# Patient Record
Sex: MALE | Race: WHITE | NOT HISPANIC OR LATINO | Employment: FULL TIME | URBAN - METROPOLITAN AREA
[De-identification: names, ages, dates, MRNs, and addresses within clinical notes are randomized per-mention and may not be internally consistent; named-entity substitution may affect disease eponyms.]

---

## 2018-10-08 ENCOUNTER — OFFICE VISIT (OUTPATIENT)
Dept: URGENT CARE | Facility: CLINIC | Age: 48
End: 2018-10-08

## 2018-10-08 VITALS
OXYGEN SATURATION: 96 % | SYSTOLIC BLOOD PRESSURE: 140 MMHG | HEIGHT: 69 IN | RESPIRATION RATE: 16 BRPM | DIASTOLIC BLOOD PRESSURE: 96 MMHG | BODY MASS INDEX: 41.47 KG/M2 | HEART RATE: 100 BPM | WEIGHT: 280 LBS | TEMPERATURE: 97.7 F

## 2018-10-08 DIAGNOSIS — J01.10 ACUTE NON-RECURRENT FRONTAL SINUSITIS: Primary | ICD-10-CM

## 2018-10-08 RX ORDER — AMOXICILLIN AND CLAVULANATE POTASSIUM 875; 125 MG/1; MG/1
1 TABLET, FILM COATED ORAL EVERY 12 HOURS SCHEDULED
Qty: 14 TABLET | Refills: 0 | Status: SHIPPED | OUTPATIENT
Start: 2018-10-08 | End: 2018-10-15

## 2018-10-08 RX ORDER — AMOXICILLIN AND CLAVULANATE POTASSIUM 875; 125 MG/1; MG/1
1 TABLET, FILM COATED ORAL EVERY 12 HOURS SCHEDULED
Qty: 14 TABLET | Refills: 0 | Status: SHIPPED | OUTPATIENT
Start: 2018-10-08 | End: 2018-10-08 | Stop reason: SDUPTHER

## 2018-10-08 NOTE — PATIENT INSTRUCTIONS
Sinusitis  augmentin as directed  Continue using flonase  Follow up with PCP in 3-5 days  Proceed to  ER if symptoms worsen  Rhinosinusitis   WHAT YOU NEED TO KNOW:   Rhinosinusitis (RS) is inflammation of your nose and sinuses  It commonly begins as a virus, often as a common cold  Viruses usually last 7 to 10 days and do not need treatment  When the virus does not get better on its own, you may have bacterial RS  This means that bacteria have begun to grow inside your sinuses  Acute RS lasts less than 4 weeks  Chronic RS lasts 12 weeks or more  Recurrent RS is when you have 4 or more episodes of RS in one year  DISCHARGE INSTRUCTIONS:   Return to the emergency department if:   · Your eye and eyelid are red, swollen, and painful  · You cannot open your eye  · You have double vision or you cannot see  · Your eyeball bulges out or you cannot move your eye  · You are more sleepy than normal or you notice changes in your ability to think, move, or talk  · You have a stiff neck, a fever, or a bad headache  · You have swelling of your forehead or scalp  Contact your healthcare provider if:   · Your symptoms are worse or do not improve after 3 to 5 days of treatment  · You have questions or concerns about your condition or care  Medicines: You may need any of the following:  · Acetaminophen  decreases pain and fever  It is available without a doctor's order  Ask how much to take and how often to take it  Follow directions  Acetaminophen can cause liver damage if not taken correctly  · NSAIDs , such as ibuprofen, help decrease swelling, pain, and fever  This medicine is available with or without a doctor's order  NSAIDs can cause stomach bleeding or kidney problems in certain people  If you take blood thinner medicine, always ask your healthcare provider if NSAIDs are safe for you  Always read the medicine label and follow directions      · Nasal steroid sprays  decrease inflammation in your nose and sinuses  · Decongestants  reduce swelling and drain mucus in the nose and sinuses  They may help you breathe easier  · Antihistamines  dry mucus in the nose and relieve sneezing  · Antibiotics  treat a bacterial infection and may be needed if your symptoms do not improve or they get worse  · Take your medicine as directed  Contact your healthcare provider if you think your medicine is not helping or if you have side effects  Tell him or her if you are allergic to any medicine  Keep a list of the medicines, vitamins, and herbs you take  Include the amounts, and when and why you take them  Bring the list or the pill bottles to follow-up visits  Carry your medicine list with you in case of an emergency  Self-care:   · Rinse your sinuses  Use a sinus rinse device to rinse your nasal passages with a saline (salt water) solution  This will help thin the mucus in your nose and rinse away pollen and dirt  It will also help reduce swelling so you can breathe normally  Ask your healthcare provider how often to do this  · Breathe in steam   Heat a bowl of water until you see steam  Lean over the bowl and make a tent over your head with a large towel  Breathe deeply for about 20 minutes  Be careful not to get too close to the steam or burn yourself  Do this 3 times a day  You can also breathe deeply when you take a hot shower  · Sleep with your head elevated  Place an extra pillow under your head before you go to sleep to help your sinuses drain  · Drink liquids as directed  Ask your healthcare provider how much liquid to drink each day and which liquids are best for you  Liquids will thin the mucus in your nose and help it drain  Avoid drinks that contain alcohol or caffeine  · Do not smoke, and avoid secondhand smoke  Nicotine and other chemicals in cigarettes and cigars can make your symptoms worse   Ask your healthcare provider for information if you currently smoke and need help to quit  E-cigarettes or smokeless tobacco still contain nicotine  Talk to your healthcare provider before you use these products  Follow up with your healthcare provider as directed: Follow up if your symptoms are worse or not better after 3 to 5 days of treatment  Write down your questions so you remember to ask them during your visits  © 2017 [employer] Information is for End User's use only and may not be sold, redistributed or otherwise used for commercial purposes  All illustrations and images included in CareNotes® are the copyrighted property of [employer]  or Andres Freeman  The above information is an  only  It is not intended as medical advice for individual conditions or treatments  Talk to your doctor, nurse or pharmacist before following any medical regimen to see if it is safe and effective for you

## 2018-10-08 NOTE — PROGRESS NOTES
[facility]        NAME: Dean Merchant is a 56 y o  male  : 1970    MRN: [id number]  DATE: 2018  TIME: 2:00 PM    Assessment and Plan   Acute non-recurrent frontal sinusitis [J01 10]  1  Acute non-recurrent frontal sinusitis  amoxicillin-clavulanate (AUGMENTIN) 875-125 mg per tablet    DISCONTINUED: amoxicillin-clavulanate (AUGMENTIN) 875-125 mg per tablet         Patient Instructions     Sinusitis  augmentin as directed  Continue using flonase  Follow up with PCP in 3-5 days  Proceed to  ER if symptoms worsen  Chief Complaint     Chief Complaint   Patient presents with    Sinusitis     started on saturday with nasal congestion, sore throat, left ear fullness, green nasal drainage          History of Present Illness       Patient c/o sinus pressure and pain, states he has green mucus coming from his nose and now his ears are starting to hurt  Denies fever, chills, SOB, n/v        Review of Systems   Review of Systems   Constitutional: Negative  HENT: Positive for congestion, ear pain, rhinorrhea, sinus pain and sinus pressure  Negative for sore throat  Eyes: Negative  Respiratory: Negative  Negative for apnea, cough, choking, chest tightness, shortness of breath, wheezing and stridor  Cardiovascular: Negative  Negative for chest pain  Current Medications       Current Outpatient Prescriptions:     amoxicillin-clavulanate (AUGMENTIN) 875-125 mg per tablet, Take 1 tablet by mouth every 12 (twelve) hours for 7 days, Disp: 14 tablet, Rfl: 0    Current Allergies     Allergies as of 10/08/2018    (No Known Allergies)            The following portions of the patient's history were reviewed and updated as appropriate: allergies, current medications, past family history, past medical history, past social history, past surgical history and problem list      History reviewed  No pertinent past medical history      Past Surgical History:   Procedure Laterality Date  FRACTURE SURGERY      left ankle, plates and screws        History reviewed  No pertinent family history  Medications have been verified  Objective   /96   Pulse 100   Temp 97 7 °F (36 5 °C)   Resp 16   Ht 5' 9" (1 753 m)   Wt 127 kg (280 lb)   SpO2 96%   BMI 41 35 kg/m²        Physical Exam     Physical Exam   Constitutional: He appears well-developed and well-nourished  HENT:   Head: Normocephalic and atraumatic  Right Ear: Hearing, tympanic membrane, external ear and ear canal normal    Left Ear: Hearing, tympanic membrane, external ear and ear canal normal    Nose: Nose normal    Mouth/Throat: Uvula is midline, oropharynx is clear and moist and mucous membranes are normal    Eyes: Pupils are equal, round, and reactive to light  Conjunctivae and EOM are normal    Neck: Normal range of motion  Neck supple  Cardiovascular: Normal rate, regular rhythm, normal heart sounds and intact distal pulses  Pulmonary/Chest: Effort normal and breath sounds normal    Lymphadenopathy:     He has no cervical adenopathy

## 2018-10-25 ENCOUNTER — OFFICE VISIT (OUTPATIENT)
Dept: URGENT CARE | Facility: CLINIC | Age: 48
End: 2018-10-25
Payer: COMMERCIAL

## 2018-10-25 VITALS
RESPIRATION RATE: 16 BRPM | OXYGEN SATURATION: 96 % | HEART RATE: 111 BPM | HEIGHT: 71 IN | BODY MASS INDEX: 38.5 KG/M2 | SYSTOLIC BLOOD PRESSURE: 158 MMHG | TEMPERATURE: 97.6 F | DIASTOLIC BLOOD PRESSURE: 101 MMHG | WEIGHT: 275 LBS

## 2018-10-25 DIAGNOSIS — J30.2 SEASONAL ALLERGIC RHINITIS, UNSPECIFIED TRIGGER: ICD-10-CM

## 2018-10-25 DIAGNOSIS — H69.82 DYSFUNCTION OF LEFT EUSTACHIAN TUBE: Primary | ICD-10-CM

## 2018-10-25 PROCEDURE — 99213 OFFICE O/P EST LOW 20 MIN: CPT | Performed by: FAMILY MEDICINE

## 2018-10-25 NOTE — PROGRESS NOTES
[facility]        NAME: Luis Daniel Goss is a 56 y o  male  : 1970    MRN: [id number]  DATE: 2018  TIME: 5:09 PM    Assessment and Plan   Dysfunction of left eustachian tube [H69 82]  1  Dysfunction of left eustachian tube     2  Seasonal allergic rhinitis, unspecified trigger       Left eustachian tube dysfunction and sinus pressure likely secondary to nasal cavity inflammation from allergies  Bacterial infection unlikely as he recently completed course of Augmentin  Advised on nasal rinsing twice daily immediately followed by Flonase twice daily  He will perform this for the next 5 days  If symptoms continue to persist, patient is encouraged to follow up with PCP or his ENT physician  Patient Instructions     Follow up with PCP in 3-5 days  Proceed to  ER if symptoms worsen  Chief Complaint     Chief Complaint   Patient presents with    Facial Pain     still having trouble with sinus infection and now having right ear pain  pt was seen here 2-3 weeks ago  pt finished a course of antibiotics          History of Present Illness     69-year-old healthy male who presents today due to persistent sinus pressure and otalgia  Was seen here on 10/08/2018 for the same reasons and prescribed Augmentin for 7 days for presumed bacterial sinusitis  Reports mild improvement in symptoms however he still has sinus pressure the left frontal sinus  Also has left ear otalgia  Denies any fevers, chills, dizziness, headache, chest pain, shortness of breath or abdominal pain  Review of Systems   Review of Systems   Constitutional: Negative for chills and fever  HENT: Positive for congestion, ear pain, rhinorrhea, sinus pain and sinus pressure  Negative for facial swelling and sore throat  Respiratory: Negative for shortness of breath  Cardiovascular: Negative for chest pain  Gastrointestinal: Negative for abdominal pain     Allergic/Immunologic: Positive for environmental allergies  Neurological: Negative for dizziness and headaches  Current Medications     No current outpatient prescriptions on file  Current Allergies     Allergies as of 10/25/2018    (No Known Allergies)            The following portions of the patient's history were reviewed and updated as appropriate: allergies, current medications, past family history, past medical history, past social history, past surgical history and problem list      History reviewed  No pertinent past medical history  Past Surgical History:   Procedure Laterality Date    FRACTURE SURGERY      left ankle, plates and screws        History reviewed  No pertinent family history  Medications have been verified  Objective   BP (!) 158/101   Pulse (!) 111   Temp 97 6 °F (36 4 °C)   Resp 16   Ht 5' 10 8" (1 798 m)   Wt 125 kg (275 lb)   SpO2 96%   BMI 38 57 kg/m²        Physical Exam     Physical Exam   Constitutional: He is oriented to person, place, and time  He appears well-developed and well-nourished  No distress  HENT:   Head: Normocephalic and atraumatic  Right Ear: External ear normal    Left Ear: External ear normal    Nose: Nose normal    Mouth/Throat: Oropharynx is clear and moist  No oropharyngeal exudate  Frontal sinuses nontender to palpation  Left nasal lacrimal region mildly tender to palpation  Face symmetric  Eyes: Pupils are equal, round, and reactive to light  Conjunctivae and EOM are normal  Right eye exhibits no discharge  Left eye exhibits no discharge  No scleral icterus  Pulmonary/Chest: Effort normal    Neurological: He is alert and oriented to person, place, and time  Skin: Skin is warm  No rash noted  He is not diaphoretic  No erythema  Psychiatric: He has a normal mood and affect  His behavior is normal  Judgment and thought content normal    Vitals reviewed

## 2018-10-29 ENCOUNTER — OFFICE VISIT (OUTPATIENT)
Dept: URGENT CARE | Facility: CLINIC | Age: 48
End: 2018-10-29
Payer: COMMERCIAL

## 2018-10-29 VITALS
RESPIRATION RATE: 16 BRPM | HEART RATE: 87 BPM | WEIGHT: 280.2 LBS | SYSTOLIC BLOOD PRESSURE: 188 MMHG | OXYGEN SATURATION: 98 % | BODY MASS INDEX: 39.3 KG/M2 | DIASTOLIC BLOOD PRESSURE: 98 MMHG | TEMPERATURE: 97 F

## 2018-10-29 DIAGNOSIS — I10 ESSENTIAL HYPERTENSION: ICD-10-CM

## 2018-10-29 DIAGNOSIS — K04.7 PERIAPICAL ABSCESS WITHOUT SINUS: Primary | ICD-10-CM

## 2018-10-29 PROCEDURE — 99213 OFFICE O/P EST LOW 20 MIN: CPT | Performed by: FAMILY MEDICINE

## 2018-10-29 RX ORDER — CIPROFLOXACIN 500 MG/1
500 TABLET, FILM COATED ORAL EVERY 12 HOURS SCHEDULED
Qty: 10 TABLET | Refills: 0 | Status: SHIPPED | OUTPATIENT
Start: 2018-10-29 | End: 2018-10-30 | Stop reason: ALTCHOICE

## 2018-10-29 NOTE — LETTER
October 29, 2018     Patient: Marisa Toney   YOB: 1970   Date of Visit: 10/29/2018       To Whom It May Concern:    Dionisiolavon Rayray was seen in my office on 10/29/2018  Plans to return to work on 10/30/2018  If you have any questions or concerns, please don't hesitate to call           Sincerely,        Marco Antonio Milan MD    CC: No Recipients

## 2018-10-29 NOTE — PROGRESS NOTES
[facility]        NAME: Aleena Virgen is a 56 y o  male  : 1970    MRN: [id number]  DATE: 2018  TIME: 12:36 PM    Assessment and Plan   Periapical abscess without sinus [K04 7]  1  Periapical abscess without sinus  clindamycin (CLEOCIN) 300 MG capsule    DISCONTINUED: ciprofloxacin (CIPRO) 500 mg tablet   2  Essential hypertension       1  Gum abscess - Although the lip gum border appears unremarkable, a tender mass can be palpated just below the left nostril concerning for abscess  Has already completed a course of Augmentin earlier this month  Will prescribe clindamycin as this has good anaerobic coverage  Encouraged to follow up PCP to ensure resolution of abscess  2  Hypertension - Diagnosis based on two elevated blood pressure readings over the past 4 days with today's reading initially being 171/122  Patient strongly encouraged to establish care with a primary care physician for further management  Patient Instructions     Follow up with PCP in 3-5 days  Proceed to  ER if symptoms worsen  Chief Complaint     Chief Complaint   Patient presents with    gum infection     PT has oozing from gum  History of Present Illness     61-year-old male who I recently saw due to rhinosinusitis presents today with complaint of a spontaneously draining small gum abscess  Reports that he noticed it this morning and it appeared like a red streak over the left upper lateral incisor  He pressed the gone with his finger and purulent fluid drained out  Since his prior visit, he has been using Neti pot nasal rinse and using Flonase for his initial symptoms  Today complains of a pressure-like sensation near the left maxillary sinus  Denies any dizziness, fevers, chills, chest pain, shortness of breath or abdominal pain  Review of Systems   Review of Systems   Constitutional: Negative for chills and fever  HENT: Positive for congestion and sinus pressure   Negative for nosebleeds  Respiratory: Negative for cough and shortness of breath  Cardiovascular: Negative for chest pain  Gastrointestinal: Negative for abdominal pain, nausea and vomiting  Allergic/Immunologic: Positive for environmental allergies  Neurological: Negative for dizziness and headaches  Current Medications       Current Outpatient Prescriptions:     clindamycin (CLEOCIN) 300 MG capsule, Take 1 capsule (300 mg total) by mouth 2 (two) times a day for 5 days, Disp: 10 capsule, Rfl: 0    Current Allergies     Allergies as of 10/29/2018    (No Known Allergies)            The following portions of the patient's history were reviewed and updated as appropriate: allergies, current medications, past family history, past medical history, past social history, past surgical history and problem list      No past medical history on file  Past Surgical History:   Procedure Laterality Date    FRACTURE SURGERY      left ankle, plates and screws        Family History   Problem Relation Age of Onset    Heart attack Maternal Grandmother     Diabetes Maternal Grandfather          Medications have been verified  Objective   BP (!) 188/98   Pulse 87   Temp (!) 97 °F (36 1 °C)   Resp 16   Wt 127 kg (280 lb 3 2 oz)   SpO2 98%   BMI 39 30 kg/m²        Physical Exam     Physical Exam   Constitutional: He is oriented to person, place, and time  He appears well-developed and well-nourished  No distress  HENT:   Head: Normocephalic and atraumatic  Nose: Nose normal    Mouth/Throat: Oropharynx is clear and moist  No oropharyngeal exudate  Small tender mass palpated just below the left nostril concerning for a deep abscess  Face appears symmetric  No gum abnormality noted above the left lateral incisor  (Abscess near the tooth likely cleared )   Eyes: Conjunctivae are normal  Right eye exhibits no discharge  Left eye exhibits no discharge  No scleral icterus     Cardiovascular: Normal rate and regular rhythm  Pulmonary/Chest: Effort normal  No respiratory distress  He has no wheezes  He has no rales  Neurological: He is alert and oriented to person, place, and time  Skin: Skin is warm  He is not diaphoretic  No erythema  Psychiatric: He has a normal mood and affect  His behavior is normal  Judgment and thought content normal    Vitals reviewed

## 2018-10-30 RX ORDER — CLINDAMYCIN HYDROCHLORIDE 300 MG/1
300 CAPSULE ORAL 2 TIMES DAILY
Qty: 10 CAPSULE | Refills: 0 | Status: SHIPPED | OUTPATIENT
Start: 2018-10-30 | End: 2018-11-04

## 2019-02-26 ENCOUNTER — OFFICE VISIT (OUTPATIENT)
Dept: FAMILY MEDICINE CLINIC | Facility: CLINIC | Age: 49
End: 2019-02-26
Payer: COMMERCIAL

## 2019-02-26 VITALS
WEIGHT: 287 LBS | BODY MASS INDEX: 40.25 KG/M2 | SYSTOLIC BLOOD PRESSURE: 144 MMHG | RESPIRATION RATE: 16 BRPM | TEMPERATURE: 97.9 F | OXYGEN SATURATION: 97 % | DIASTOLIC BLOOD PRESSURE: 104 MMHG | HEART RATE: 104 BPM

## 2019-02-26 DIAGNOSIS — Z83.3 FAMILY HISTORY OF DIABETES MELLITUS (DM): ICD-10-CM

## 2019-02-26 DIAGNOSIS — E66.01 MORBID OBESITY (HCC): ICD-10-CM

## 2019-02-26 DIAGNOSIS — I10 ESSENTIAL HYPERTENSION: ICD-10-CM

## 2019-02-26 DIAGNOSIS — R03.0 BLOOD PRESSURE ELEVATED WITHOUT HISTORY OF HTN: ICD-10-CM

## 2019-02-26 DIAGNOSIS — Z76.89 ENCOUNTER TO ESTABLISH CARE: Primary | ICD-10-CM

## 2019-02-26 PROCEDURE — 3725F SCREEN DEPRESSION PERFORMED: CPT | Performed by: NURSE PRACTITIONER

## 2019-02-26 PROCEDURE — 4010F ACE/ARB THERAPY RXD/TAKEN: CPT | Performed by: NURSE PRACTITIONER

## 2019-02-26 PROCEDURE — 99203 OFFICE O/P NEW LOW 30 MIN: CPT | Performed by: NURSE PRACTITIONER

## 2019-02-26 RX ORDER — LISINOPRIL 10 MG/1
10 TABLET ORAL DAILY
Qty: 30 TABLET | Refills: 1 | Status: SHIPPED | OUTPATIENT
Start: 2019-02-26 | End: 2019-05-10 | Stop reason: SDUPTHER

## 2019-02-26 NOTE — PROGRESS NOTES
Assessment/Plan:  1  Encounter to establish care  Health maintenance discussed  Diet, exercise, weight management, stress management, etc    All questions addressed, answered, and pt verbalized understanding  Anticipatory guidance  - CBC and differential; Future  - Comprehensive metabolic panel; Future  2  Morbid obesity ([facility] [address] )  BMI Counseling: Body mass index is 40 25 kg/m²  Discussed the patient's BMI with him  The BMI is above average  BMI counseling and education was provided to the patient  Nutrition recommendations include reducing portion sizes, decreasing overall calorie intake, reducing fast food intake, consuming healthier snacks, decreasing soda and/or juice intake and moderation in carbohydrate intake  Exercise recommendations include exercising 3-5 times per week  - CBC and differential; Future  - Comprehensive metabolic panel; Future  - Hemoglobin A1C; Future  - Lipid panel; Future  - TSH, 3rd generation; Future  3  BMI 40 0-44 9, adult ([facility])  BMI Counseling: Body mass index is 40 25 kg/m²  Discussed the patient's BMI with him  The BMI is above average  BMI counseling and education was provided to the patient  Nutrition recommendations include reducing portion sizes, decreasing overall calorie intake, reducing fast food intake, consuming healthier snacks, decreasing soda and/or juice intake and moderation in carbohydrate intake  Exercise recommendations include exercising 3-5 times per week  - CBC and differential; Future  - Comprehensive metabolic panel; Future  - Hemoglobin A1C; Future  - Lipid panel; Future  - TSH, 3rd generation; Future  4  Blood pressure elevated without history of HTN  - CBC and differential; Future  - Comprehensive metabolic panel; Future  5  Essential hypertension  Will start lisinopril  Check labs  Weight loss  Monitor bp  Will recheck bp in office in one week  - lisinopril (ZESTRIL) 10 mg tablet;  Take 1 tablet (10 mg total) by mouth daily  Dispense: 30 tablet; Refill: 1    Patient was counseled regarding instructions for management which included: impression/diagnosis, risk/benefits of treatment options, importance of compliance with treatment, risk factor reduction, and prognosis  I have reviewed the instructions with the patient answering all questions and patient verbalized understanding  Subjective:      Patient ID: Jaswant Leigh is a 53 y o  male who presents to establish care    Here to establish care  Has been told has high blood pressure but never diagnosed  No chest pain  No sob  No headache  No dizziness  No recent labs  The following portions of the patient's history were reviewed and updated as appropriate: allergies, current medications, past family history, past medical history, past social history, past surgical history and problem list     Review of Systems   Constitutional: Negative for activity change, appetite change, fatigue and unexpected weight change  Respiratory: Negative for cough, chest tightness, shortness of breath and wheezing  Cardiovascular: Negative for chest pain, palpitations and leg swelling  Gastrointestinal: Negative for abdominal pain, diarrhea, nausea and vomiting  Skin: Negative for color change  Neurological: Negative for dizziness, speech difficulty and headaches  Objective:      BP (!) 144/104 (BP Location: Right arm, Patient Position: Sitting, Cuff Size: Large)   Pulse 104   Temp 97 9 °F (36 6 °C) (Temporal)   Resp 16   Wt 130 kg (287 lb)   SpO2 97%   BMI 40 25 kg/m²          Physical Exam   Constitutional: He is oriented to person, place, and time  He appears well-developed and well-nourished  No distress  Obese, pleasant, cooperative   Neck: Normal range of motion  Neck supple  Cardiovascular: Normal rate and regular rhythm  No audible carotid bruit  No jvd   Pulmonary/Chest: Effort normal and breath sounds normal  No respiratory distress  He has no wheezes  Musculoskeletal: He exhibits no edema  Neurological: He is alert and oriented to person, place, and time  No cranial nerve deficit  Coordination normal    Skin: Skin is warm and dry  No rash noted  He is not diaphoretic  No erythema  Psychiatric: He has a normal mood and affect   His behavior is normal  Judgment and thought content normal

## 2019-02-28 LAB
ALBUMIN SERPL-MCNC: 4.5 G/DL (ref 3.5–5.5)
ALBUMIN/GLOB SERPL: 1.6 {RATIO} (ref 1.2–2.2)
ALP SERPL-CCNC: 56 IU/L (ref 39–117)
ALT SERPL-CCNC: 54 IU/L (ref 0–44)
AST SERPL-CCNC: 27 IU/L (ref 0–40)
BASOPHILS # BLD AUTO: 0 X10E3/UL (ref 0–0.2)
BASOPHILS NFR BLD AUTO: 0 %
BILIRUB SERPL-MCNC: 0.5 MG/DL (ref 0–1.2)
BUN SERPL-MCNC: 14 MG/DL (ref 6–24)
BUN/CREAT SERPL: 13 (ref 9–20)
CALCIUM SERPL-MCNC: 9.6 MG/DL (ref 8.7–10.2)
CHLORIDE SERPL-SCNC: 101 MMOL/L (ref 96–106)
CHOLEST SERPL-MCNC: 214 MG/DL (ref 100–199)
CHOLEST/HDLC SERPL: 7.6 RATIO (ref 0–5)
CO2 SERPL-SCNC: 23 MMOL/L (ref 20–29)
CREAT SERPL-MCNC: 1.07 MG/DL (ref 0.76–1.27)
EOSINOPHIL # BLD AUTO: 0.3 X10E3/UL (ref 0–0.4)
EOSINOPHIL NFR BLD AUTO: 3 %
ERYTHROCYTE [DISTWIDTH] IN BLOOD BY AUTOMATED COUNT: 13.1 % (ref 12.3–15.4)
EST. AVERAGE GLUCOSE BLD GHB EST-MCNC: 157 MG/DL
GLOBULIN SER-MCNC: 2.8 G/DL (ref 1.5–4.5)
GLUCOSE SERPL-MCNC: 144 MG/DL (ref 65–99)
HBA1C MFR BLD: 7.1 % (ref 4.8–5.6)
HCT VFR BLD AUTO: 45.3 % (ref 37.5–51)
HDLC SERPL-MCNC: 28 MG/DL
HGB BLD-MCNC: 15.8 G/DL (ref 13–17.7)
IMM GRANULOCYTES # BLD: 0 X10E3/UL (ref 0–0.1)
IMM GRANULOCYTES NFR BLD: 0 %
LDLC SERPL CALC-MCNC: 158 MG/DL (ref 0–99)
LYMPHOCYTES # BLD AUTO: 3.3 X10E3/UL (ref 0.7–3.1)
LYMPHOCYTES NFR BLD AUTO: 39 %
MCH RBC QN AUTO: 30 PG (ref 26.6–33)
MCHC RBC AUTO-ENTMCNC: 34.9 G/DL (ref 31.5–35.7)
MCV RBC AUTO: 86 FL (ref 79–97)
MONOCYTES # BLD AUTO: 0.6 X10E3/UL (ref 0.1–0.9)
MONOCYTES NFR BLD AUTO: 8 %
NEUTROPHILS # BLD AUTO: 4.3 X10E3/UL (ref 1.4–7)
NEUTROPHILS NFR BLD AUTO: 50 %
PLATELET # BLD AUTO: 250 X10E3/UL (ref 150–379)
POTASSIUM SERPL-SCNC: 4.5 MMOL/L (ref 3.5–5.2)
PROT SERPL-MCNC: 7.3 G/DL (ref 6–8.5)
RBC # BLD AUTO: 5.27 X10E6/UL (ref 4.14–5.8)
SL AMB EGFR AFRICAN AMERICAN: 95 ML/MIN/1.73
SL AMB EGFR NON AFRICAN AMERICAN: 82 ML/MIN/1.73
SL AMB VLDL CHOLESTEROL CALC: 28 MG/DL (ref 5–40)
SODIUM SERPL-SCNC: 139 MMOL/L (ref 134–144)
TRIGL SERPL-MCNC: 142 MG/DL (ref 0–149)
TSH SERPL DL<=0.005 MIU/L-ACNC: 1.24 UIU/ML (ref 0.45–4.5)
WBC # BLD AUTO: 8.5 X10E3/UL (ref 3.4–10.8)

## 2019-02-28 PROCEDURE — 3045F PR MOST RECENT HEMOGLOBIN A1C LEVEL 7.0-9.0%: CPT | Performed by: NURSE PRACTITIONER

## 2019-03-05 ENCOUNTER — OFFICE VISIT (OUTPATIENT)
Dept: FAMILY MEDICINE CLINIC | Facility: CLINIC | Age: 49
End: 2019-03-05
Payer: COMMERCIAL

## 2019-03-05 VITALS
OXYGEN SATURATION: 95 % | DIASTOLIC BLOOD PRESSURE: 70 MMHG | BODY MASS INDEX: 38.92 KG/M2 | HEIGHT: 71 IN | HEART RATE: 108 BPM | RESPIRATION RATE: 20 BRPM | TEMPERATURE: 97.7 F | WEIGHT: 278 LBS | SYSTOLIC BLOOD PRESSURE: 110 MMHG

## 2019-03-05 DIAGNOSIS — I10 ESSENTIAL HYPERTENSION: Primary | ICD-10-CM

## 2019-03-05 DIAGNOSIS — E66.01 CLASS 2 SEVERE OBESITY DUE TO EXCESS CALORIES WITH SERIOUS COMORBIDITY AND BODY MASS INDEX (BMI) OF 39.0 TO 39.9 IN ADULT (HCC): ICD-10-CM

## 2019-03-05 DIAGNOSIS — E13.9 DIABETES 1.5, MANAGED AS TYPE 2 (HCC): ICD-10-CM

## 2019-03-05 DIAGNOSIS — E78.2 MIXED HYPERLIPIDEMIA: ICD-10-CM

## 2019-03-05 PROBLEM — E66.812 CLASS 2 SEVERE OBESITY DUE TO EXCESS CALORIES WITH SERIOUS COMORBIDITY AND BODY MASS INDEX (BMI) OF 39.0 TO 39.9 IN ADULT (HCC): Status: ACTIVE | Noted: 2019-03-05

## 2019-03-05 PROCEDURE — 3078F DIAST BP <80 MM HG: CPT | Performed by: NURSE PRACTITIONER

## 2019-03-05 PROCEDURE — 3008F BODY MASS INDEX DOCD: CPT | Performed by: NURSE PRACTITIONER

## 2019-03-05 PROCEDURE — 99214 OFFICE O/P EST MOD 30 MIN: CPT | Performed by: NURSE PRACTITIONER

## 2019-03-05 PROCEDURE — 1036F TOBACCO NON-USER: CPT | Performed by: NURSE PRACTITIONER

## 2019-03-05 PROCEDURE — 3074F SYST BP LT 130 MM HG: CPT | Performed by: NURSE PRACTITIONER

## 2019-03-05 RX ORDER — ATORVASTATIN CALCIUM 20 MG/1
20 TABLET, FILM COATED ORAL DAILY
Qty: 30 TABLET | Refills: 3 | Status: SHIPPED | OUTPATIENT
Start: 2019-03-05 | End: 2019-07-27 | Stop reason: SDUPTHER

## 2019-03-05 NOTE — PROGRESS NOTES
Assessment/Plan:  1  Essential hypertension  bp stable  Continue lisinopril  Monitor bp  - CBC and differential; Future  - Comprehensive metabolic panel; Future  - Microalbumin / creatinine urine ratio; Future  - 2  Diabetes 1 5, managed as type 2 ([address] )  Carb controlled diet  Will start metformin  Weight loss  Exercise  - metFORMIN (GLUCOPHAGE) 500 mg tablet; Take 1 tablet (500 mg total) by mouth 2 (two) times a day with meals  Dispense: 60 tablet; Refill: 3  - CBC and differential; Future  - Comprehensive metabolic panel; Future  - Hemoglobin A1C; Future  - 3  Mixed hyperlipidemia  Heart healthy diet  Will start lipitor    - atorvastatin (LIPITOR) 20 mg tablet; Take 1 tablet (20 mg total) by mouth daily  Dispense: 30 tablet; Refill: 3  - CBC and differential; Future  - Comprehensive metabolic panel; Future  - Lipid panel; Future  4  Class 2 severe obesity due to excess calories with serious comorbidity and body mass index (BMI) of 39 0 to 39 9 in adult (HCC)  BMI Counseling: Body mass index is 39 33 kg/m²  Discussed the patient's BMI with him  The BMI is above average  BMI counseling and education was provided to the patient  Nutrition recommendations include reducing portion sizes, decreasing overall calorie intake, reducing fast food intake, consuming healthier snacks, decreasing soda and/or juice intake, moderation in carbohydrate intake and reducing intake of saturated fat and trans fat  Exercise recommendations include exercising 3-5 times per week  - CBC and differential; Future  - Comprehensive metabolic panel; Future  - CBC and differential  - Comprehensive metabolic panel    Patient was counseled regarding instructions for management which included: impression/diagnosis, risk/benefits of treatment options, importance of compliance with treatment, risk factor reduction, and prognosis     I have reviewed the instructions with the patient answering all questions and patient verbalized understanding  Subjective:      Patient ID: Sabrina Miller is a 53 y o  male who presents to follow up on labs and bp    Here to review labs  Was started on bp meds on 2/26/19  Tolerating with no adverse effects  Denies any physical c/o  Has started making changes to diet  Lost 9 lbs in past week  The following portions of the patient's history were reviewed and updated as appropriate: allergies, current medications, past family history, past medical history, past social history, past surgical history and problem list     Review of Systems   Constitutional: Negative for activity change, appetite change, fatigue and unexpected weight change  Respiratory: Negative for cough and shortness of breath  Cardiovascular: Negative for chest pain, palpitations and leg swelling  Gastrointestinal: Negative for diarrhea, nausea and vomiting  Neurological: Negative for dizziness and headaches  Objective:  Recent Results (from the past 672 hour(s))   CBC and differential    Collection Time: 02/27/19  7:48 AM   Result Value Ref Range    White Blood Cell Count 8 5 3 4 - 10 8 x10E3/uL    Red Blood Cell Count 5 27 4  14 - 5 80 x10E6/uL    Hemoglobin 15 8 13 0 - 17 7 g/dL    HCT 45 3 37 5 - 51 0 %    MCV 86 79 - 97 fL    MCH 30 0 26 6 - 33 0 pg    MCHC 34 9 31 5 - 35 7 g/dL    RDW 13 1 12 3 - 15 4 %    Platelet Count 132 296 - 379 x10E3/uL    Neutrophils 50 Not Estab  %    Lymphocytes 39 Not Estab  %    Monocytes 8 Not Estab  %    Eosinophils 3 Not Estab  %    Basophils PCT 0 Not Estab  %    Neutrophils (Absolute) 4 3 1 4 - 7 0 x10E3/uL    Lymphocytes (Absolute) 3 3 (H) 0 7 - 3 1 x10E3/uL    Monocytes (Absolute) 0 6 0 1 - 0 9 x10E3/uL    Eosinophils (Absolute) 0 3 0 0 - 0 4 x10E3/uL    Basophils ABS 0 0 0 0 - 0 2 x10E3/uL    Immature Granulocytes 0 Not Estab  %    Immature Granulocytes (Absolute) 0 0 0 0 - 0 1 x10E3/uL   Comprehensive metabolic panel    Collection Time: 02/27/19  7:48 AM   Result Value Ref Range    Glucose, Random 144 (H) 65 - 99 mg/dL    BUN 14 6 - 24 mg/dL    Creatinine 1 07 0 76 - 1 27 mg/dL    eGFR Non  82 >59 mL/min/1 73    eGFR  95 >59 mL/min/1 73    SL AMB BUN/CREATININE RATIO 13 9 - 20    Sodium 139 134 - 144 mmol/L    Potassium 4 5 3 5 - 5 2 mmol/L    Chloride 101 96 - 106 mmol/L    CO2 23 20 - 29 mmol/L    CALCIUM 9 6 8 7 - 10 2 mg/dL    Protein, Total 7 3 6 0 - 8 5 g/dL    Albumin 4 5 3 5 - 5 5 g/dL    Globulin, Total 2 8 1 5 - 4 5 g/dL    Albumin/Globulin Ratio 1 6 1 2 - 2 2    TOTAL BILIRUBIN 0 5 0 0 - 1 2 mg/dL    Alk Phos Isoenzymes 56 39 - 117 IU/L    AST 27 0 - 40 IU/L    ALT 54 (H) 0 - 44 IU/L   Hemoglobin A1C    Collection Time: 02/27/19  7:48 AM   Result Value Ref Range    Hemoglobin A1C 7 1 (H) 4 8 - 5 6 %    Estimated Average Glucose 157 mg/dL   Lipid panel    Collection Time: 02/27/19  7:48 AM   Result Value Ref Range    Cholesterol, Total 214 (H) 100 - 199 mg/dL    Triglycerides 142 0 - 149 mg/dL    HDL 28 (L) >39 mg/dL    VLDL Cholesterol Calculated 28 5 - 40 mg/dL    LDL Direct 158 (H) 0 - 99 mg/dL    T  Chol/HDL Ratio 7 6 (H) 0 0 - 5 0 ratio   TSH, 3rd generation    Collection Time: 02/27/19  7:48 AM   Result Value Ref Range    TSH 1 240 0 450 - 4 500 uIU/mL         /70   Pulse (!) 108   Temp 97 7 °F (36 5 °C) (Temporal)   Resp 20   Ht 5' 10 5" (1 791 m)   Wt 126 kg (278 lb)   SpO2 95%   BMI 39 33 kg/m²          Physical Exam   Constitutional: He is oriented to person, place, and time  He appears well-developed and well-nourished  No distress  Neck: Normal range of motion  Neck supple  Cardiovascular: Normal rate and regular rhythm  No JVD  No audible carotid bruit   Pulmonary/Chest: Effort normal and breath sounds normal  No respiratory distress  He has no wheezes  He has no rales  Musculoskeletal: He exhibits no edema  Neurological: He is alert and oriented to person, place, and time     Skin: Skin is warm and dry  He is not diaphoretic  No erythema  No pallor  Psychiatric: He has a normal mood and affect  His behavior is normal  Judgment normal    Vitals reviewed

## 2019-03-05 NOTE — PATIENT INSTRUCTIONS
Continue Lisinopril  (blood pressure)  Start Lipitor 20mg daily (cholesterol)  Metformin 500mg twice daily with meals  (diabetes)  Heart healthy diet  Carbohydrate controlled diet  Exercise  Weight loss

## 2019-03-26 ENCOUNTER — OFFICE VISIT (OUTPATIENT)
Dept: FAMILY MEDICINE CLINIC | Facility: CLINIC | Age: 49
End: 2019-03-26
Payer: COMMERCIAL

## 2019-03-26 VITALS
SYSTOLIC BLOOD PRESSURE: 136 MMHG | HEART RATE: 108 BPM | TEMPERATURE: 99.1 F | OXYGEN SATURATION: 96 % | DIASTOLIC BLOOD PRESSURE: 80 MMHG | WEIGHT: 280 LBS | HEIGHT: 71 IN | BODY MASS INDEX: 39.2 KG/M2 | RESPIRATION RATE: 14 BRPM

## 2019-03-26 DIAGNOSIS — R53.83 FATIGUE, UNSPECIFIED TYPE: ICD-10-CM

## 2019-03-26 DIAGNOSIS — G47.19 EXCESSIVE DAYTIME SLEEPINESS: Primary | ICD-10-CM

## 2019-03-26 DIAGNOSIS — R06.83 SNORING: ICD-10-CM

## 2019-03-26 PROCEDURE — 1036F TOBACCO NON-USER: CPT | Performed by: NURSE PRACTITIONER

## 2019-03-26 PROCEDURE — 99213 OFFICE O/P EST LOW 20 MIN: CPT | Performed by: NURSE PRACTITIONER

## 2019-03-26 PROCEDURE — 3008F BODY MASS INDEX DOCD: CPT | Performed by: NURSE PRACTITIONER

## 2019-03-26 NOTE — PROGRESS NOTES
Assessment/Plan:    1  Excessive daytime sleepiness  - Diagnostic Sleep Study; Future  2  Snoring  - Diagnostic Sleep Study; Future  3  Fatigue, unspecified type  - Diagnostic Sleep Study; Future  4  BMI 39 0-39 9,adult  - Diagnostic Sleep Study; Future          Subjective:      Patient ID: Amie Zurita is a 53 y o  male who presents to discuss possible sleep apnea    Here to discuss possible sleep apnea  Always tired  Even feels like could fall asleep in stopped in car  Snores  Has been told stops breathing while sleeping  Has had symptoms for years  The following portions of the patient's history were reviewed and updated as appropriate: allergies, current medications, past family history, past medical history, past social history, past surgical history and problem list     Review of Systems   Constitutional: Positive for fatigue  Respiratory: Positive for apnea (has been told stops breathing while sleeping)  Negative for shortness of breath  Cardiovascular: Negative for chest pain, palpitations and leg swelling  Neurological: Negative for dizziness and headaches  Objective:  [place] sleep scale: score 18  Sleep bang sleep scale: score 7    /80 (BP Location: Left arm, Patient Position: Sitting, Cuff Size: Large)   Pulse (!) 108   Temp 99 1 °F (37 3 °C) (Temporal)   Resp 14   Ht 5' 11" (1 803 m)   Wt 127 kg (280 lb)   SpO2 96%   BMI 39 05 kg/m²          Physical Exam   Constitutional: He is oriented to person, place, and time  He appears well-developed and well-nourished  Pulmonary/Chest: Effort normal and breath sounds normal    Neurological: He is alert and oriented to person, place, and time  Skin: Skin is warm and dry  Psychiatric: He has a normal mood and affect  His behavior is normal  Judgment and thought content normal    Vitals reviewed

## 2019-04-30 ENCOUNTER — HOSPITAL ENCOUNTER (OUTPATIENT)
Dept: SLEEP CENTER | Facility: CLINIC | Age: 49
Discharge: HOME | End: 2019-04-30
Payer: COMMERCIAL

## 2019-04-30 DIAGNOSIS — R06.83 SNORING: ICD-10-CM

## 2019-04-30 DIAGNOSIS — G47.19 EXCESSIVE DAYTIME SLEEPINESS: ICD-10-CM

## 2019-04-30 DIAGNOSIS — R53.83 FATIGUE, UNSPECIFIED TYPE: ICD-10-CM

## 2019-04-30 PROCEDURE — 95810 POLYSOM 6/> YRS 4/> PARAM: CPT

## 2019-05-02 ENCOUNTER — TELEPHONE (OUTPATIENT)
Dept: SLEEP CENTER | Facility: CLINIC | Age: 49
End: 2019-05-02

## 2019-05-07 ENCOUNTER — TELEPHONE (OUTPATIENT)
Dept: FAMILY MEDICINE CLINIC | Facility: CLINIC | Age: 49
End: 2019-05-07

## 2019-05-10 DIAGNOSIS — I10 ESSENTIAL HYPERTENSION: ICD-10-CM

## 2019-05-11 PROCEDURE — 4010F ACE/ARB THERAPY RXD/TAKEN: CPT | Performed by: NURSE PRACTITIONER

## 2019-05-11 RX ORDER — LISINOPRIL 10 MG/1
TABLET ORAL
Qty: 30 TABLET | Refills: 1 | Status: SHIPPED | OUTPATIENT
Start: 2019-05-11 | End: 2019-07-27 | Stop reason: SDUPTHER

## 2019-05-30 ENCOUNTER — OFFICE VISIT (OUTPATIENT)
Dept: FAMILY MEDICINE CLINIC | Facility: CLINIC | Age: 49
End: 2019-05-30
Payer: COMMERCIAL

## 2019-05-30 VITALS
WEIGHT: 282 LBS | OXYGEN SATURATION: 96 % | RESPIRATION RATE: 12 BRPM | HEIGHT: 70 IN | TEMPERATURE: 98.4 F | DIASTOLIC BLOOD PRESSURE: 90 MMHG | SYSTOLIC BLOOD PRESSURE: 130 MMHG | HEART RATE: 75 BPM | BODY MASS INDEX: 40.37 KG/M2

## 2019-05-30 DIAGNOSIS — H66.92 ACUTE LEFT OTITIS MEDIA: Primary | ICD-10-CM

## 2019-05-30 PROCEDURE — 3008F BODY MASS INDEX DOCD: CPT | Performed by: NURSE PRACTITIONER

## 2019-05-30 PROCEDURE — 99213 OFFICE O/P EST LOW 20 MIN: CPT | Performed by: NURSE PRACTITIONER

## 2019-05-30 RX ORDER — AMOXICILLIN 875 MG/1
875 TABLET, COATED ORAL 2 TIMES DAILY
Qty: 14 TABLET | Refills: 0 | Status: SHIPPED | OUTPATIENT
Start: 2019-05-30 | End: 2019-06-06

## 2019-06-10 ENCOUNTER — TELEPHONE (OUTPATIENT)
Dept: FAMILY MEDICINE CLINIC | Facility: CLINIC | Age: 49
End: 2019-06-10

## 2019-06-27 ENCOUNTER — OFFICE VISIT (OUTPATIENT)
Dept: SLEEP CENTER | Facility: CLINIC | Age: 49
End: 2019-06-27
Payer: COMMERCIAL

## 2019-06-27 VITALS
BODY MASS INDEX: 41.62 KG/M2 | HEART RATE: 98 BPM | WEIGHT: 281 LBS | HEIGHT: 69 IN | SYSTOLIC BLOOD PRESSURE: 143 MMHG | DIASTOLIC BLOOD PRESSURE: 105 MMHG

## 2019-06-27 DIAGNOSIS — E66.01 MORBID OBESITY WITH BMI OF 40.0-44.9, ADULT (HCC): ICD-10-CM

## 2019-06-27 DIAGNOSIS — J30.2 SEASONAL ALLERGIC RHINITIS, UNSPECIFIED TRIGGER: ICD-10-CM

## 2019-06-27 DIAGNOSIS — G47.9 SLEEP DISTURBANCE: ICD-10-CM

## 2019-06-27 DIAGNOSIS — G47.33 OSA (OBSTRUCTIVE SLEEP APNEA): Primary | ICD-10-CM

## 2019-06-27 DIAGNOSIS — I10 ESSENTIAL HYPERTENSION: ICD-10-CM

## 2019-06-27 DIAGNOSIS — G47.19 EXCESSIVE DAYTIME SLEEPINESS: ICD-10-CM

## 2019-06-27 DIAGNOSIS — R51.9 HEADACHE UPON AWAKENING: ICD-10-CM

## 2019-06-27 PROCEDURE — 99204 OFFICE O/P NEW MOD 45 MIN: CPT | Performed by: INTERNAL MEDICINE

## 2019-07-02 ENCOUNTER — TELEPHONE (OUTPATIENT)
Dept: FAMILY MEDICINE CLINIC | Facility: CLINIC | Age: 49
End: 2019-07-02

## 2019-07-02 NOTE — TELEPHONE ENCOUNTER
Called and left message, pt has outstanding labs that he needs to have done and an appt to go over them

## 2019-07-03 LAB
LEFT EYE DIABETIC RETINOPATHY: NORMAL
RIGHT EYE DIABETIC RETINOPATHY: NORMAL

## 2019-07-10 ENCOUNTER — TELEPHONE (OUTPATIENT)
Dept: SLEEP CENTER | Facility: CLINIC | Age: 49
End: 2019-07-10

## 2019-07-10 ENCOUNTER — TRANSCRIBE ORDERS (OUTPATIENT)
Dept: SLEEP CENTER | Facility: CLINIC | Age: 49
End: 2019-07-10

## 2019-07-10 DIAGNOSIS — G47.33 OSA (OBSTRUCTIVE SLEEP APNEA): Primary | ICD-10-CM

## 2019-07-10 NOTE — TELEPHONE ENCOUNTER
----- Message from Maryellen Loera sent at 7/10/2019  9:19 AM EDT -----  Regarding: RE: Your Recent Visit  Contact: [phone]  DR Cony Harris       My insurance denied my second sleep study for not medical necessity  They approved in home study instead of in lab   They said you can appeal it   I would prefer to have this done in the lab instead of at home   The # to appeal is [phone]       ----- Message -----  From: Za Tompkins MD  Sent: 6/27/19 10:52 AM  To: Maryellen Evaristo  Subject: Your Recent Visit    Maryellen Loera, you have a new After Visit Summary in [address]  Please click on visits and then your most recent appointment, to view your After Visit Summary  If you are experiencing any technical issues please call our patient service desk at [phone]) option [phone]

## 2019-07-14 DIAGNOSIS — G47.33 OSA (OBSTRUCTIVE SLEEP APNEA): Primary | ICD-10-CM

## 2019-07-15 NOTE — TELEPHONE ENCOUNTER
Discussed APAP order with patient- he was unclear as to what APAP was but after discussing is ok with proceeding with DME set-up  He is going to call insurance company to find DME provider & call back with that information

## 2019-07-16 NOTE — TELEPHONE ENCOUNTER
Patient left a message on voicemail asking for return call- called patient back and left message for him to return call

## 2019-07-27 DIAGNOSIS — E13.9 DIABETES 1.5, MANAGED AS TYPE 2 (HCC): ICD-10-CM

## 2019-07-27 DIAGNOSIS — I10 ESSENTIAL HYPERTENSION: ICD-10-CM

## 2019-07-27 DIAGNOSIS — E78.2 MIXED HYPERLIPIDEMIA: ICD-10-CM

## 2019-07-27 PROCEDURE — 4010F ACE/ARB THERAPY RXD/TAKEN: CPT | Performed by: NURSE PRACTITIONER

## 2019-07-27 RX ORDER — LISINOPRIL 10 MG/1
TABLET ORAL
Qty: 30 TABLET | Refills: 1 | Status: SHIPPED | OUTPATIENT
Start: 2019-07-27 | End: 2020-02-18 | Stop reason: SDUPTHER

## 2019-07-27 RX ORDER — ATORVASTATIN CALCIUM 20 MG/1
TABLET, FILM COATED ORAL
Qty: 30 TABLET | Refills: 3 | Status: SHIPPED | OUTPATIENT
Start: 2019-07-27 | End: 2020-02-18 | Stop reason: SDUPTHER

## 2019-08-01 ENCOUNTER — TELEPHONE (OUTPATIENT)
Dept: FAMILY MEDICINE CLINIC | Facility: CLINIC | Age: 49
End: 2019-08-01

## 2019-08-01 NOTE — TELEPHONE ENCOUNTER
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

## 2019-09-26 ENCOUNTER — OFFICE VISIT (OUTPATIENT)
Dept: SLEEP CENTER | Facility: CLINIC | Age: 49
End: 2019-09-26
Payer: COMMERCIAL

## 2019-09-26 VITALS
HEART RATE: 76 BPM | SYSTOLIC BLOOD PRESSURE: 147 MMHG | DIASTOLIC BLOOD PRESSURE: 107 MMHG | WEIGHT: 276 LBS | HEIGHT: 69 IN | BODY MASS INDEX: 40.88 KG/M2

## 2019-09-26 DIAGNOSIS — G47.19 EXCESSIVE DAYTIME SLEEPINESS: ICD-10-CM

## 2019-09-26 DIAGNOSIS — G47.33 OSA (OBSTRUCTIVE SLEEP APNEA): Primary | ICD-10-CM

## 2019-09-26 DIAGNOSIS — R51.9 HEADACHE UPON AWAKENING: ICD-10-CM

## 2019-09-26 DIAGNOSIS — G47.9 SLEEP DISTURBANCE: ICD-10-CM

## 2019-09-26 DIAGNOSIS — I10 ESSENTIAL HYPERTENSION: ICD-10-CM

## 2019-09-26 DIAGNOSIS — J30.2 SEASONAL ALLERGIC RHINITIS, UNSPECIFIED TRIGGER: ICD-10-CM

## 2019-09-26 DIAGNOSIS — E66.01 MORBID OBESITY WITH BMI OF 40.0-44.9, ADULT (HCC): ICD-10-CM

## 2019-09-26 PROCEDURE — 99214 OFFICE O/P EST MOD 30 MIN: CPT | Performed by: INTERNAL MEDICINE

## 2019-09-26 NOTE — PROGRESS NOTES
Follow-Up Note - [address]  53 y o  male  :1970  DJN:[id number]    CC: I saw this patient for follow-up in clinic today for his Sleep Disordered Breathing, Coexisting Sleep and Medical Problems  PFSH, Problem List, Medications & Allergies were reviewed in EMR  Interval changes: none reported  He  has a past medical history of Class 2 severe obesity due to excess calories with serious comorbidity and body mass index (BMI) of 39 0 to 39 9 in adult [facility]) (3/5/2019), Diabetes 1 5, managed as type 2 ([facility] [address] ) (3/5/2019), and Essential hypertension (3/5/2019)  He has a current medication list which includes the following prescription(s): atorvastatin, lisinopril, and metformin  ROS: Reviewed (see attached)  Significant for some recent weight loss  He has nasal symptoms due to allergies  DATA REVIEWED:  He states he has met his compliance requirement  Presently, using PAP > 4 hours/night most of the time & is benefitting from use  Recently he missed a few days while traveling for work  Respiratory event index is 1 per hour at 10 cm H2O   SUBJECTIVE: Regarding use of PAP, Aditya Rapp reports:   · He is experiencing no  adverse effects, but at times removes the mask after few hours to allow better sleep :   · He is   benefiting from use: sleeping better and improved headaches    Sleep Routine: He reports getting 7 hrs sleep  ; he has no difficulty initiating or maintaining sleep   He awakens with the aid of an alarm and feels refreshed  He significantly improved  excessive drowsiness   He rated himself at Total score: 11 /24 on the [place] sleepiness scale  Habits: reports that he quit smoking about 15 years ago  His smoking use included cigarettes  He quit smokeless tobacco use about 20 years ago  ,  reports that he does not drink alcohol ,  reports that he does not use drugs  , Caffeine use: limited , Exercise routine: sometimes         OBJECTIVE: BP (!) 147/107   Pulse 76 Ht 5' 9" (1 753 m)   Wt 125 kg (276 lb)   BMI 40 76 kg/m²    Constitutional: Patient is well groomed; well appearing  Skin/Extrem: warm & dry; col & hydration normal; no edema  Psych: cooperativeand in no distress  Mental State appears normal   CNS: Alert, orientated, clear & coherent speech  H&N: EOMI; NC/AT:no facial pressure marks, no rashes  "    ENMT Mucus membranes normal Nasal airway:patent  Oral airway: crowded  Resp:effort is normal CVS: RRR ABD:truncal obesity MSK:Gait normal     ASSESSMENT: Primary Sleep/Secondary(to Medical or Psych conditions) & comorbidities   1  [place] (obstructive sleep apnea)  PAP DME Resupply/Reorder   2  Sleep disturbance      Improved   3  Headache upon awakening      Improved   4  Excessive daytime sleepiness     5  Essential hypertension     6  Morbid obesity with BMI of 40 0-44 9, adult ([facility] [address] )     7  Seasonal allergic rhinitis, unspecified trigger         PLAN:  1  Treatment with  PAP is medically necessary and Alvaton Carbine is agreable to continue use  2  Care of equipment, methods to improve comfort using PAP and importance of compliance with therapy were discussed  3  Pressure setting: continue 10 cmH2O     4  Rx provided to replace supplies and Care coordinated with DME provider  5  Strategies for weight reduction were discussed  6  Nasal symptoms may improve with regular nasal saline rinse followed by topical nasal steroid if necessary  7  I also advised allowing sufficient opportunity for sleep  8  Follow-up is advised in 1 year or sooner if needed to monitor progress, compliance and to adjust therapy  Thank you for allowing me to participate in the care of this patient      Sincerely,    Authenticated electronically by Jie Christine MD on 09/50/53   Board Certified Specialist

## 2019-09-26 NOTE — PROGRESS NOTES
Review of Systems      Genitourinary none   Cardiology none   Gastrointestinal none   Neurology none   Constitutional none   Integumentary none   Psychiatry none   Musculoskeletal joint pain, muscle aches and back pain   Pulmonary none   ENT none   Endocrine none   Hematological none

## 2019-09-26 NOTE — PATIENT INSTRUCTIONS

## 2019-10-02 ENCOUNTER — TELEPHONE (OUTPATIENT)
Dept: SLEEP CENTER | Facility: CLINIC | Age: 49
End: 2019-10-02

## 2019-10-02 ENCOUNTER — OFFICE VISIT (OUTPATIENT)
Dept: URGENT CARE | Facility: CLINIC | Age: 49
End: 2019-10-02
Payer: COMMERCIAL

## 2019-10-02 VITALS
OXYGEN SATURATION: 98 % | TEMPERATURE: 97 F | WEIGHT: 276 LBS | HEIGHT: 69 IN | DIASTOLIC BLOOD PRESSURE: 96 MMHG | RESPIRATION RATE: 18 BRPM | BODY MASS INDEX: 40.88 KG/M2 | HEART RATE: 107 BPM | SYSTOLIC BLOOD PRESSURE: 159 MMHG

## 2019-10-02 DIAGNOSIS — H92.02 LEFT EAR PAIN: Primary | ICD-10-CM

## 2019-10-02 PROCEDURE — 99213 OFFICE O/P EST LOW 20 MIN: CPT | Performed by: NURSE PRACTITIONER

## 2019-10-02 NOTE — PROGRESS NOTES
[facility]        NAME: Familia Jones is a 53 y o  male  : 1970    MRN: [id number]  DATE: 2019  TIME: 5:43 PM    Assessment and Plan   Left ear pain [H92 02]  1  Left ear pain           Patient Instructions     Begin to use your Flonase 2 times per day, 1 spray in each nostril  Begin to take Claritin daily  Continue to use your Neti pot  Use tylenol/ibuprofen as needed  Refrain from sticking any objects in your ear  Take your blood pressure medication as prescribe by your PCP  Follow up with ear, nose, and throat MD if no improvement      Follow up with PCP in 3-5 days  Proceed to  ER if symptoms worsen  Chief Complaint     Chief Complaint   Patient presents with    Ear Problem     left ear pain,pressure, tickle since last night          History of Present Illness       52 y/o male presents for left ear pain that started yesterday  He has nasal congestion and rhinorrhea since the end of last week  He has a history of allergies  He denies any sore throat, cough, fever, N/V, fatigue, SOB, or right ear pain  He uses Flonase daily and a Neti pot  Review of Systems   Review of Systems   Constitutional: Negative for chills and fever  HENT: Positive for congestion, ear pain and rhinorrhea  Negative for ear discharge, postnasal drip, sinus pressure, sinus pain and sore throat  Respiratory: Negative for cough  Cardiovascular: Negative for chest pain and palpitations  Gastrointestinal: Negative for nausea and vomiting  Skin: Negative for pallor  Neurological: Negative for headaches           Current Medications       Current Outpatient Medications:     atorvastatin (LIPITOR) 20 mg tablet, take 1 tablet by mouth once daily, Disp: 30 tablet, Rfl: 3    lisinopril (ZESTRIL) 10 mg tablet, take 1 tablet by mouth once daily, Disp: 30 tablet, Rfl: 1    metFORMIN (GLUCOPHAGE) 500 mg tablet, TAKE 1 TABLET BY MOUTH TWICE A DAY WITH MEALS, Disp: 60 tablet, Rfl: 3    Current Allergies     Allergies as of 10/02/2019    (No Known Allergies)            The following portions of the patient's history were reviewed and updated as appropriate: allergies, current medications, past family history, past medical history, past social history, past surgical history and problem list      Past Medical History:   Diagnosis Date    Class 2 severe obesity due to excess calories with serious comorbidity and body mass index (BMI) of 39 0 to 39 9 in adult [facility]) 3/5/2019    Diabetes 1 5, managed as type 2 ([facility] [address] ) 3/5/2019    Essential hypertension 3/5/2019       Past Surgical History:   Procedure Laterality Date    FRACTURE SURGERY      left ankle, plates and screws        Family History   Problem Relation Age of Onset    Heart attack Maternal Grandmother     Diabetes Maternal Grandfather     Mental illness Neg Hx     Substance Abuse Neg Hx          Medications have been verified  Objective   /96   Pulse (!) 107   Temp (!) 97 °F (36 1 °C)   Resp 18   Ht 5' 9" (1 753 m)   Wt 125 kg (276 lb)   SpO2 98%   BMI 40 76 kg/m²        Physical Exam     Physical Exam   Constitutional: He is oriented to person, place, and time  He appears well-developed and well-nourished  No distress  HENT:   Right Ear: Tympanic membrane and ear canal normal    Left Ear: Tympanic membrane and ear canal normal    Nose: Nose normal    Mouth/Throat: Uvula is midline and oropharynx is clear and moist    Cardiovascular: Normal rate, regular rhythm and normal heart sounds  Pulmonary/Chest: Effort normal and breath sounds normal  No respiratory distress  Neurological: He is alert and oriented to person, place, and time  He has normal strength  GCS eye subscore is 4  GCS verbal subscore is 5  GCS motor subscore is 6  Skin: Skin is warm and dry  Nursing note and vitals reviewed

## 2019-10-02 NOTE — PATIENT INSTRUCTIONS
Begin to use your Flonase 2 times per day, 1 spray in each nostril  Begin to take Claritin daily  Continue to use your Neti pot  Use tylenol/ibuprofen as needed  Refrain from sticking any objects in your ear  Take your blood pressure medication as prescribe by your PCP  Follow up with ear, nose, and throat MD if no improvement

## 2020-02-18 ENCOUNTER — OFFICE VISIT (OUTPATIENT)
Dept: FAMILY MEDICINE CLINIC | Facility: CLINIC | Age: 50
End: 2020-02-18
Payer: COMMERCIAL

## 2020-02-18 VITALS
TEMPERATURE: 97.8 F | BODY MASS INDEX: 39.69 KG/M2 | OXYGEN SATURATION: 98 % | DIASTOLIC BLOOD PRESSURE: 98 MMHG | SYSTOLIC BLOOD PRESSURE: 124 MMHG | WEIGHT: 268 LBS | RESPIRATION RATE: 16 BRPM | HEIGHT: 69 IN | HEART RATE: 84 BPM

## 2020-02-18 DIAGNOSIS — E11.9 TYPE 2 DIABETES MELLITUS WITHOUT COMPLICATION, WITHOUT LONG-TERM CURRENT USE OF INSULIN (HCC): Primary | ICD-10-CM

## 2020-02-18 DIAGNOSIS — Z11.4 SCREENING FOR HIV (HUMAN IMMUNODEFICIENCY VIRUS): ICD-10-CM

## 2020-02-18 DIAGNOSIS — R53.83 FATIGUE, UNSPECIFIED TYPE: ICD-10-CM

## 2020-02-18 DIAGNOSIS — E78.2 MIXED HYPERLIPIDEMIA: ICD-10-CM

## 2020-02-18 DIAGNOSIS — I10 ESSENTIAL HYPERTENSION: ICD-10-CM

## 2020-02-18 DIAGNOSIS — E66.01 CLASS 2 SEVERE OBESITY DUE TO EXCESS CALORIES WITH SERIOUS COMORBIDITY AND BODY MASS INDEX (BMI) OF 39.0 TO 39.9 IN ADULT (HCC): ICD-10-CM

## 2020-02-18 LAB — SL AMB POCT HEMOGLOBIN AIC: 9.4 (ref ?–6.5)

## 2020-02-18 PROCEDURE — 3080F DIAST BP >= 90 MM HG: CPT | Performed by: NURSE PRACTITIONER

## 2020-02-18 PROCEDURE — 99214 OFFICE O/P EST MOD 30 MIN: CPT | Performed by: NURSE PRACTITIONER

## 2020-02-18 PROCEDURE — 3046F HEMOGLOBIN A1C LEVEL >9.0%: CPT | Performed by: NURSE PRACTITIONER

## 2020-02-18 PROCEDURE — 3074F SYST BP LT 130 MM HG: CPT | Performed by: NURSE PRACTITIONER

## 2020-02-18 PROCEDURE — 3008F BODY MASS INDEX DOCD: CPT | Performed by: NURSE PRACTITIONER

## 2020-02-18 PROCEDURE — 4010F ACE/ARB THERAPY RXD/TAKEN: CPT | Performed by: NURSE PRACTITIONER

## 2020-02-18 PROCEDURE — 1036F TOBACCO NON-USER: CPT | Performed by: NURSE PRACTITIONER

## 2020-02-18 PROCEDURE — 83036 HEMOGLOBIN GLYCOSYLATED A1C: CPT | Performed by: NURSE PRACTITIONER

## 2020-02-18 PROCEDURE — 36415 COLL VENOUS BLD VENIPUNCTURE: CPT | Performed by: NURSE PRACTITIONER

## 2020-02-18 RX ORDER — LISINOPRIL 10 MG/1
10 TABLET ORAL DAILY
Qty: 30 TABLET | Refills: 3 | Status: SHIPPED | OUTPATIENT
Start: 2020-02-18 | End: 2020-05-21

## 2020-02-18 RX ORDER — ATORVASTATIN CALCIUM 20 MG/1
20 TABLET, FILM COATED ORAL DAILY
Qty: 30 TABLET | Refills: 3 | Status: SHIPPED | OUTPATIENT
Start: 2020-02-18 | End: 2020-05-21

## 2020-02-18 NOTE — PATIENT INSTRUCTIONS
Metformin 500mg twice daily with food  Lipitor 20 mg daily   Lisinopril 10mg daily  Carbohydrate , heart healthy diet  Regular exercise  Will check labs

## 2020-02-18 NOTE — PROGRESS NOTES
Assessment/Plan:  1  Type 2 diabetes mellitus without complication, without long-term current use of insulin (HCC)  Carb controlled diet  Resume metformin 500mg twice daily  Will check labs  Weight loss  Regular exercise  - POCT hemoglobin A1c  - CBC and differential  - Comprehensive metabolic panel  - Lipid panel  - TSH, 3rd generation  - metFORMIN (GLUCOPHAGE) 500 mg tablet; Take 1 tablet (500 mg total) by mouth 2 (two) times a day with meals  Dispense: 60 tablet; Refill: 3  2  Essential hypertension  Resume lisinopril  Monitor bp  Will recheck bp in office at physical next week  - CBC and differential  - Comprehensive metabolic panel  - lisinopril (ZESTRIL) 10 mg tablet; Take 1 tablet (10 mg total) by mouth daily  Dispense: 30 tablet; Refill:   3  Mixed hyperlipidemia  Heart healthy diet  Resume lipitor  Weight loss  Regular exercise  - CBC and differential  - Comprehensive metabolic panel  - Lipid panel  - atorvastatin (LIPITOR) 20 mg tablet; Take 1 tablet (20 mg total) by mouth daily  Dispense: 30 tablet; Refill: 3  4  Class 2 severe obesity due to excess calories with serious comorbidity and body mass index (BMI) of 39 0 to 39 9 in adult (HCC)  - CBC and differential  - Comprehensive metabolic panel  - TSH, 3rd generation  BMI Counseling: Body mass index is 39 58 kg/m²  The BMI is above normal  Nutrition recommendations include reducing portion sizes, decreasing overall calorie intake, reducing fast food intake, consuming healthier snacks, moderation in carbohydrate intake, reducing intake of saturated fat and trans fat and reducing intake of cholesterol  Exercise recommendations include exercising 3-5 times per week  5  Fatigue, unspecified type  - CBC and differential  - Vitamin D 25 hydroxy  6   Screening for HIV (human immunodeficiency virus)  - Human Immunodeficiency Virus 1/2 Antigen / Antibody ( Fourth Generation) with Reflex Testing    Patient was counseled regarding instructions for management which included: impression/diagnosis, risk/benefits of treatment options, importance of compliance with treatment, risk factor reduction, and prognosis  I have reviewed the instructions with the patient answering all questions and patient verbalized understanding  Depression Screening Follow-up Plan: Patient's depression screening was positive with a PHQ-2 score of 0  Their PHQ-9 score was 0  Clinically patient does not have depression  No treatment is required  Subjective:      Patient ID: Taj Walters is a 51 y o  male who presents for sick visit    Here stating not feeling well for a few weeks  History of dm, htn, and high chol  Was prescribed metformin, lisinopril, and lipitor over a year ago  No recent labs  Off meds for about 6 months  Just didn't refill meds and did not get labs done  Just generally feels off  No chest pain, no n/v/d, no headache or dizziness  Does not check bp or blood sugar at home  Sometimes ears ringing  Thinks bp might be up  Cannot explain but just not feeling well  The following portions of the patient's history were reviewed and updated as appropriate: allergies, current medications, past family history, past medical history, past social history, past surgical history and problem list     Review of Systems   Constitutional: Positive for fatigue  Negative for activity change, appetite change and unexpected weight change  HENT: Positive for tinnitus (at times)  Negative for congestion  Respiratory: Negative for chest tightness  Cardiovascular: Negative for chest pain and palpitations  Gastrointestinal: Negative for diarrhea, nausea and vomiting  Endocrine: Negative for cold intolerance, heat intolerance, polydipsia, polyphagia and polyuria  Skin: Negative for rash and wound  Allergic/Immunologic: Negative for immunocompromised state  Neurological: Negative for dizziness and headaches     Psychiatric/Behavioral: Negative for self-injury and suicidal ideas  The patient is not nervous/anxious  Objective:  hgba1c 9 4    /92 (BP Location: Right arm, Patient Position: Sitting, Cuff Size: Adult)   Pulse 84   Temp 97 8 °F (36 6 °C) (Temporal)   Resp 16   Ht 5' 9" (1 753 m)   Wt 122 kg (268 lb)   SpO2 98%   BMI 39 58 kg/m²          Physical Exam   Constitutional: He is oriented to person, place, and time  He appears well-developed and well-nourished  No distress  Neck: Normal range of motion  Neck supple  Cardiovascular: Normal rate and regular rhythm  Pulses are no weak pulses  Pulses:       Dorsalis pedis pulses are 2+ on the right side, and 2+ on the left side  Posterior tibial pulses are 2+ on the right side, and 2+ on the left side  No JVD  No audible carotid bruit  No peripheral edema  Pulmonary/Chest: Effort normal and breath sounds normal  No respiratory distress  He has no wheezes  Abdominal: Soft  Bowel sounds are normal  He exhibits no distension  There is no tenderness  Musculoskeletal: He exhibits no edema  Feet:   Right Foot:   Skin Integrity: Negative for ulcer, skin breakdown, erythema, warmth, callus or dry skin  Left Foot:   Skin Integrity: Negative for ulcer, skin breakdown, erythema, warmth, callus or dry skin  Neurological: He is alert and oriented to person, place, and time  No cranial nerve deficit  Coordination normal    Skin: Skin is warm and dry  No rash noted  He is not diaphoretic  No erythema  No pallor  Psychiatric: He has a normal mood and affect  His behavior is normal  Judgment and thought content normal    Vitals reviewed  Patient's shoes and socks removed  Right Foot/Ankle   Right Foot Inspection  Skin Exam: skin normal and skin intact no dry skin, no warmth, no callus, no erythema, no maceration, no abnormal color, no pre-ulcer, no ulcer and no callus                          Toe Exam: ROM and strength within normal limitsno swelling, no tenderness, erythema and  no right toe deformity  Sensory   Vibration: intact  Proprioception: intact   Monofilament testing: intact  Vascular  Capillary refills: < 3 seconds  The right DP pulse is 2+  The right PT pulse is 2+  Left Foot/Ankle  Left Foot Inspection  Skin Exam: skin normal and skin intactno dry skin, no warmth, no erythema, no maceration, normal color, no pre-ulcer, no ulcer and no callus                         Toe Exam: ROM and strength within normal limitsno swelling, no tenderness, no erythema and no left toe deformity                   Sensory   Vibration: intact  Proprioception: intact  Monofilament: intact  Vascular  Capillary refills: < 3 seconds  The left DP pulse is 2+  The left PT pulse is 2+  Assign Risk Category:  No deformity present; No loss of protective sensation;  No weak pulses       Risk: 0

## 2020-02-18 NOTE — LETTER
February 18, 2020     Patient: Kaye Foster   YOB: 1970   Date of Visit: 2/18/2020       To Whom it May Concern:    Roma Amor is under my professional care  He was seen in my office on 2/18/2020  He may return to work on 2/19/2020  He was unable to work 2/18/2020 for medical reasons       If you have any questions or concerns, please don't hesitate to call           Sincerely,          PHYLLIS Stewart        CC: No Recipients

## 2020-02-19 LAB
25(OH)D3+25(OH)D2 SERPL-MCNC: 9.1 NG/ML (ref 30–100)
ALBUMIN SERPL-MCNC: 4.3 G/DL (ref 4–5)
ALBUMIN/GLOB SERPL: 1.4 {RATIO} (ref 1.2–2.2)
ALP SERPL-CCNC: 63 IU/L (ref 39–117)
ALT SERPL-CCNC: 65 IU/L (ref 0–44)
AST SERPL-CCNC: 32 IU/L (ref 0–40)
BASOPHILS # BLD AUTO: 0 X10E3/UL (ref 0–0.2)
BASOPHILS NFR BLD AUTO: 0 %
BILIRUB SERPL-MCNC: 0.4 MG/DL (ref 0–1.2)
BUN SERPL-MCNC: 14 MG/DL (ref 6–24)
BUN/CREAT SERPL: 15 (ref 9–20)
CALCIUM SERPL-MCNC: 9.5 MG/DL (ref 8.7–10.2)
CHLORIDE SERPL-SCNC: 98 MMOL/L (ref 96–106)
CHOLEST SERPL-MCNC: 140 MG/DL (ref 100–199)
CHOLEST/HDLC SERPL: 6.4 RATIO (ref 0–5)
CO2 SERPL-SCNC: 23 MMOL/L (ref 20–29)
CREAT SERPL-MCNC: 0.96 MG/DL (ref 0.76–1.27)
EOSINOPHIL # BLD AUTO: 0.2 X10E3/UL (ref 0–0.4)
EOSINOPHIL NFR BLD AUTO: 2 %
ERYTHROCYTE [DISTWIDTH] IN BLOOD BY AUTOMATED COUNT: 13.2 % (ref 11.6–15.4)
GLOBULIN SER-MCNC: 3 G/DL (ref 1.5–4.5)
GLUCOSE SERPL-MCNC: 245 MG/DL (ref 65–99)
HCT VFR BLD AUTO: 45.6 % (ref 37.5–51)
HDLC SERPL-MCNC: 22 MG/DL
HGB BLD-MCNC: 16.3 G/DL (ref 13–17.7)
HIV 1+2 AB+HIV1 P24 AG SERPL QL IA: NON REACTIVE
IMM GRANULOCYTES # BLD: 0 X10E3/UL (ref 0–0.1)
IMM GRANULOCYTES NFR BLD: 0 %
LDLC SERPL CALC-MCNC: 78 MG/DL (ref 0–99)
LYMPHOCYTES # BLD AUTO: 2.9 X10E3/UL (ref 0.7–3.1)
LYMPHOCYTES NFR BLD AUTO: 29 %
MCH RBC QN AUTO: 29.8 PG (ref 26.6–33)
MCHC RBC AUTO-ENTMCNC: 35.7 G/DL (ref 31.5–35.7)
MCV RBC AUTO: 83 FL (ref 79–97)
MONOCYTES # BLD AUTO: 0.7 X10E3/UL (ref 0.1–0.9)
MONOCYTES NFR BLD AUTO: 7 %
NEUTROPHILS # BLD AUTO: 6.1 X10E3/UL (ref 1.4–7)
NEUTROPHILS NFR BLD AUTO: 62 %
PLATELET # BLD AUTO: 222 X10E3/UL (ref 150–450)
POTASSIUM SERPL-SCNC: 4.2 MMOL/L (ref 3.5–5.2)
PROT SERPL-MCNC: 7.3 G/DL (ref 6–8.5)
RBC # BLD AUTO: 5.47 X10E6/UL (ref 4.14–5.8)
SL AMB EGFR AFRICAN AMERICAN: 108 ML/MIN/1.73
SL AMB EGFR NON AFRICAN AMERICAN: 93 ML/MIN/1.73
SL AMB VLDL CHOLESTEROL CALC: 40 MG/DL (ref 5–40)
SODIUM SERPL-SCNC: 136 MMOL/L (ref 134–144)
TRIGL SERPL-MCNC: 200 MG/DL (ref 0–149)
TSH SERPL DL<=0.005 MIU/L-ACNC: 1.26 UIU/ML (ref 0.45–4.5)
WBC # BLD AUTO: 9.9 X10E3/UL (ref 3.4–10.8)

## 2020-02-25 ENCOUNTER — OFFICE VISIT (OUTPATIENT)
Dept: FAMILY MEDICINE CLINIC | Facility: CLINIC | Age: 50
End: 2020-02-25
Payer: COMMERCIAL

## 2020-02-25 VITALS
HEIGHT: 69 IN | RESPIRATION RATE: 12 BRPM | DIASTOLIC BLOOD PRESSURE: 74 MMHG | TEMPERATURE: 98.6 F | SYSTOLIC BLOOD PRESSURE: 132 MMHG | WEIGHT: 274 LBS | HEART RATE: 96 BPM | BODY MASS INDEX: 40.58 KG/M2 | OXYGEN SATURATION: 98 %

## 2020-02-25 DIAGNOSIS — E11.9 TYPE 2 DIABETES MELLITUS WITHOUT COMPLICATION, WITHOUT LONG-TERM CURRENT USE OF INSULIN (HCC): ICD-10-CM

## 2020-02-25 DIAGNOSIS — E55.9 VITAMIN D DEFICIENCY: ICD-10-CM

## 2020-02-25 DIAGNOSIS — Z00.00 ANNUAL PHYSICAL EXAM: Primary | ICD-10-CM

## 2020-02-25 PROCEDURE — 3046F HEMOGLOBIN A1C LEVEL >9.0%: CPT | Performed by: NURSE PRACTITIONER

## 2020-02-25 PROCEDURE — 99396 PREV VISIT EST AGE 40-64: CPT | Performed by: NURSE PRACTITIONER

## 2020-02-25 RX ORDER — ASPIRIN 81 MG/1
81 TABLET ORAL DAILY
COMMUNITY
End: 2020-09-29 | Stop reason: SDUPTHER

## 2020-02-25 RX ORDER — ERGOCALCIFEROL 1.25 MG/1
50000 CAPSULE ORAL WEEKLY
Qty: 4 CAPSULE | Refills: 5 | Status: SHIPPED | OUTPATIENT
Start: 2020-02-25 | End: 2020-09-10 | Stop reason: SDUPTHER

## 2020-02-25 NOTE — PROGRESS NOTES
FAMILY PRACTICE HEALTH MAINTENANCE OFFICE VISIT  [facility]    NAME: Santos Garcia  AGE: 51 y o  SEX: male  : 1970     DATE: 2020    Assessment and Plan     1  Annual physical exam  Heart healthy diet- limit red meat, limit saturated fat, moderate salt intake, limit junk food, etc    Regular exercise  Stress management  Routine labwork and screenings as ordered  2  Vitamin D deficiency  - ergocalciferol (VITAMIN D2) 50,000 units; Take 1 capsule (50,000 Units total) by mouth once a week  Dispense: 4 capsule; Refill: 5  3  Type 2 diabetes mellitus without complication, without long-term current use of insulin (HCC)  Continue metformin  Carb controlled diet  Weight loss  Increase exercise  Will recheck hgba1c in 3 months  · Patient Counseling:   · Nutrition: Stressed importance of a well balanced diet, moderation of sodium/saturated fat, caloric balance and sufficient intake of fiber  · Exercise: Stressed the importance of regular exercise with a goal of 150 minutes per week  · Dental Health: Discussed daily flossing and brushing and regular dental visits   · Alcohol Use:  Recommended moderation of alcohol intake  · Injury Prevention: Discussed Safety Belts, Safety Helmets, and Smoke Detectors    · Immunizations reviewed: Risks and Benefits discussed  · Discussed benefits of:  Screening labs   BMI Counseling: Body mass index is 40 46 kg/m²  Discussed with patient's BMI with him  The BMI is above normal  Nutrition recommendations include reducing portion sizes, decreasing overall calorie intake, reducing fast food intake, consuming healthier snacks, decreasing soda and/or juice intake and moderation in carbohydrate intake  Exercise recommendations include exercising 3-5 times per week            Chief Complaint     Chief Complaint   Patient presents with    Annual Exam       History of Present Illness     Here for annual physical    Seen in office last week  Had stopped all meds for dm, lipids, htn   Since that time resumed metformin, lisinopril, and lipitor  Feels much better since starting meds  Does not check blood sugar  Has made minor changes in diet  Starting having banana for breakfast and used to eat pastries  Does not exercise regularly  Well Adult Physical   Patient here for a comprehensive physical exam       Diet and Physical Activity  Diet: poor diet  Exercise: rarely      Depression Screen  Depression Screening Follow-up Plan: Patient's depression screening was negative with a PHQ-2 score of 0    Clinically patient does not have depression  No treatment is required  General Health  Hearing: Normal:  bilateral  Vision: wears glasses  Dental: no dental visits for >1 year and brushes teeth twice daily            The following portions of the patient's history were reviewed and updated as appropriate: allergies, current medications, past family history, past medical history, past social history, past surgical history and problem list     Review of Systems     Review of Systems   Constitutional: Negative for activity change, appetite change and unexpected weight change  Respiratory: Negative for chest tightness and shortness of breath  Cardiovascular: Negative for chest pain, palpitations and leg swelling  Gastrointestinal: Negative for abdominal pain, diarrhea, nausea and vomiting  Endocrine: Negative for cold intolerance, heat intolerance, polydipsia, polyphagia and polyuria  Musculoskeletal: Negative for arthralgias and myalgias  Skin: Negative for color change, pallor, rash and wound  Allergic/Immunologic: Negative for immunocompromised state  Neurological: Negative for dizziness and light-headedness  Hematological: Negative for adenopathy  Psychiatric/Behavioral: Negative for self-injury and suicidal ideas  The patient is not nervous/anxious          Past Medical History     Past Medical History: Diagnosis Date    Class 2 severe obesity due to excess calories with serious comorbidity and body mass index (BMI) of 39 0 to 39 9 in adult [facility]) 3/5/2019    [facility] 3/[facility]    Essential hypertension 3/5/2019       Past Surgical History     Past Surgical History:   Procedure Laterality Date    FRACTURE SURGERY      left ankle, plates and screws        Social History     Social History     Socioeconomic History    Marital status:      Spouse name: Not on file    Number of children: Not on file    Years of education: Not on file    Highest education level: Not on file   Occupational History    Not on file   Social Needs    Financial resource strain: Not on file    Food insecurity:     Worry: Not on file     Inability: Not on file    Transportation needs:     Medical: Not on file     Non-medical: Not on file   Tobacco Use    Smoking status: Former Smoker     Types: Cigarettes     Last attempt to quit: 2004     Years since quittin 0    Smokeless tobacco: Former User     Quit date: 1999   Substance and Sexual Activity    Alcohol use: No    Drug use: No    Sexual activity: Not on file   Lifestyle    Physical activity:     Days per week: Not on file     Minutes per session: Not on file    Stress: Not on file   Relationships    Social connections:     Talks on phone: Not on file     Gets together: Not on file     Attends [employer] service: Not on file     Active member of club or organization: Not on file     Attends meetings of clubs or organizations: Not on file     Relationship status: Not on file    Intimate partner violence:     Fear of current or ex partner: Not on file     Emotionally abused: Not on file     Physically abused: Not on file     Forced sexual activity: Not on file   Other Topics Concern    Not on file   Social History Narrative    Not on file       Family History     Family History   Problem Relation Age of Onset    Heart attack Maternal Grandmother     Diabetes Maternal Grandfather     Mental illness Neg Hx     Substance Abuse Neg Hx        Current Medications       Current Outpatient Medications:     atorvastatin (LIPITOR) 20 mg tablet, Take 1 tablet (20 mg total) by mouth daily, Disp: 30 tablet, Rfl: 3    lisinopril (ZESTRIL) 10 mg tablet, Take 1 tablet (10 mg total) by mouth daily, Disp: 30 tablet, Rfl: 3    metFORMIN (GLUCOPHAGE) 500 mg tablet, Take 1 tablet (500 mg total) by mouth 2 (two) times a day with meals, Disp: 60 tablet, Rfl: 3     Allergies     No Known Allergies    Objective     /74 (BP Location: Right arm, Patient Position: Sitting, Cuff Size: Large)   Pulse 96   Temp 98 6 °F (37 °C) (Temporal)   Resp 12   Ht 5' 9" (1 753 m)   Wt 124 kg (274 lb)   SpO2 98%   BMI 40 46 kg/m²     Recent Results (from the past 672 hour(s))   CBC and differential    Collection Time: 02/18/20  2:26 PM   Result Value Ref Range    White Blood Cell Count 9 9 3 4 - 10 8 x10E3/uL    Red Blood Cell Count 5 47 4 14 - 5 80 x10E6/uL    Hemoglobin 16 3 13 0 - 17 7 g/dL    HCT 45 6 37 5 - 51 0 %    MCV 83 79 - 97 fL    MCH 29 8 26 6 - 33 0 pg    MCHC 35 7 31 5 - 35 7 g/dL    RDW 13 2 11 6 - 15 4 %    Platelet Count 367 902 - 450 x10E3/uL    Neutrophils 62 Not Estab  %    Lymphocytes 29 Not Estab  %    Monocytes 7 Not Estab  %    Eosinophils 2 Not Estab  %    Basophils PCT 0 Not Estab  %    Neutrophils (Absolute) 6 1 1 4 - 7 0 x10E3/uL    Lymphocytes (Absolute) 2 9 0 7 - 3 1 x10E3/uL    Monocytes (Absolute) 0 7 0 1 - 0 9 x10E3/uL    Eosinophils (Absolute) 0 2 0 0 - 0 4 x10E3/uL    Basophils ABS 0 0 0 0 - 0 2 x10E3/uL    Immature Granulocytes 0 Not Estab  %    Immature Granulocytes (Absolute) 0 0 0 0 - 0 1 x10E3/uL   Comprehensive metabolic panel    Collection Time: 02/18/20  2:26 PM   Result Value Ref Range    Glucose, Random 245 (H) 65 - 99 mg/dL    BUN 14 6 - 24 mg/dL    Creatinine 0 96 0 76 - 1 27 mg/dL    eGFR Non  American 93 >59 mL/min/1 73    eGFR  108 >59 mL/min/1 73    SL AMB BUN/CREATININE RATIO 15 9 - 20    Sodium 136 134 - 144 mmol/L    Potassium 4 2 3 5 - 5 2 mmol/L    Chloride 98 96 - 106 mmol/L    CO2 23 20 - 29 mmol/L    CALCIUM 9 5 8 7 - 10 2 mg/dL    Protein, Total 7 3 6 0 - 8 5 g/dL    Albumin 4 3 4 0 - 5 0 g/dL    Globulin, Total 3 0 1 5 - 4 5 g/dL    Albumin/Globulin Ratio 1 4 1 2 - 2 2    TOTAL BILIRUBIN 0 4 0 0 - 1 2 mg/dL    Alk Phos Isoenzymes 63 39 - 117 IU/L    AST 32 0 - 40 IU/L    ALT 65 (H) 0 - 44 IU/L   Lipid panel    Collection Time: 02/18/20  2:26 PM   Result Value Ref Range    Cholesterol, Total 140 100 - 199 mg/dL    Triglycerides 200 (H) 0 - 149 mg/dL    HDL 22 (L) >39 mg/dL    VLDL Cholesterol Calculated 40 5 - 40 mg/dL    LDL Direct 78 0 - 99 mg/dL    T  Chol/HDL Ratio 6 4 (H) 0 0 - 5 0 ratio   Human Immunodeficiency Virus 1/2 Antigen / Antibody ( Fourth Generation) with Reflex Testing    Collection Time: 02/18/20  2:26 PM   Result Value Ref Range    HIV Screen 4th Generation wRflx Non Reactive Non Reactive   TSH, 3rd generation    Collection Time: 02/18/20  2:26 PM   Result Value Ref Range    TSH 1 260 0 450 - 4 500 uIU/mL   Vitamin D 25 hydroxy    Collection Time: 02/18/20  2:26 PM   Result Value Ref Range    25-HYDROXY VIT D 9 1 (L) 30 0 - 100 0 ng/mL   POCT hemoglobin A1c    Collection Time: 02/18/20  3:06 PM   Result Value Ref Range    Hemoglobin A1C 9 4 (A) 6 5          Physical Exam   Constitutional: He is oriented to person, place, and time  He appears well-developed and well-nourished  No distress  Cardiovascular: Normal rate and regular rhythm  No JVD  No audible carotid bruit  No peripheral edema  Pulmonary/Chest: Effort normal and breath sounds normal  No respiratory distress  He has no wheezes  Musculoskeletal: He exhibits no edema  Neurological: He is alert and oriented to person, place, and time  No cranial nerve deficit   Coordination normal    Skin: Skin is warm and dry  No rash noted  He is not diaphoretic  No erythema  No pallor  Psychiatric: He has a normal mood and affect  His behavior is normal  Judgment and thought content normal    Vitals reviewed                Sylvester Chowdhury

## 2020-02-25 NOTE — PATIENT INSTRUCTIONS
Vitamin D2 50,000 units weekly for 6 months  Will repeat level in 6 months  Carbohydrate controlled diet  Continue diabetic medications as ordered  Increase regular exercise: Consider such things as walking, biking, strength training , etc    As discussed, your lab values indicate abnormal lipids  Start heart healthy diet as reviewed (limit red meat, saturated fats, alcohol, etc)  Cholesterol lowering medication has been ordered and should be taken daily

## 2020-05-20 DIAGNOSIS — I10 ESSENTIAL HYPERTENSION: ICD-10-CM

## 2020-05-20 DIAGNOSIS — E11.9 TYPE 2 DIABETES MELLITUS WITHOUT COMPLICATION, WITHOUT LONG-TERM CURRENT USE OF INSULIN (HCC): ICD-10-CM

## 2020-05-20 DIAGNOSIS — E78.2 MIXED HYPERLIPIDEMIA: ICD-10-CM

## 2020-05-21 RX ORDER — LISINOPRIL 10 MG/1
TABLET ORAL
Qty: 30 TABLET | Refills: 3 | Status: SHIPPED | OUTPATIENT
Start: 2020-05-21 | End: 2020-06-10

## 2020-05-21 RX ORDER — ATORVASTATIN CALCIUM 20 MG/1
TABLET, FILM COATED ORAL
Qty: 30 TABLET | Refills: 3 | Status: SHIPPED | OUTPATIENT
Start: 2020-05-21 | End: 2020-06-10

## 2020-06-10 DIAGNOSIS — E11.9 TYPE 2 DIABETES MELLITUS WITHOUT COMPLICATION, WITHOUT LONG-TERM CURRENT USE OF INSULIN (HCC): ICD-10-CM

## 2020-06-10 DIAGNOSIS — E78.2 MIXED HYPERLIPIDEMIA: ICD-10-CM

## 2020-06-10 DIAGNOSIS — I10 ESSENTIAL HYPERTENSION: ICD-10-CM

## 2020-06-10 PROCEDURE — 4010F ACE/ARB THERAPY RXD/TAKEN: CPT | Performed by: NURSE PRACTITIONER

## 2020-06-10 RX ORDER — LISINOPRIL 10 MG/1
TABLET ORAL
Qty: 30 TABLET | Refills: 0 | Status: SHIPPED | OUTPATIENT
Start: 2020-06-10 | End: 2020-09-10 | Stop reason: SDUPTHER

## 2020-06-10 RX ORDER — ATORVASTATIN CALCIUM 20 MG/1
TABLET, FILM COATED ORAL
Qty: 30 TABLET | Refills: 0 | Status: SHIPPED | OUTPATIENT
Start: 2020-06-10 | End: 2020-07-13

## 2020-06-23 ENCOUNTER — OFFICE VISIT (OUTPATIENT)
Dept: FAMILY MEDICINE CLINIC | Facility: CLINIC | Age: 50
End: 2020-06-23
Payer: COMMERCIAL

## 2020-06-23 VITALS
TEMPERATURE: 98.6 F | SYSTOLIC BLOOD PRESSURE: 124 MMHG | WEIGHT: 269 LBS | HEART RATE: 80 BPM | HEIGHT: 69 IN | RESPIRATION RATE: 16 BRPM | DIASTOLIC BLOOD PRESSURE: 84 MMHG | BODY MASS INDEX: 39.84 KG/M2

## 2020-06-23 DIAGNOSIS — I10 ESSENTIAL HYPERTENSION: ICD-10-CM

## 2020-06-23 DIAGNOSIS — E66.01 CLASS 2 SEVERE OBESITY DUE TO EXCESS CALORIES WITH SERIOUS COMORBIDITY AND BODY MASS INDEX (BMI) OF 39.0 TO 39.9 IN ADULT (HCC): ICD-10-CM

## 2020-06-23 DIAGNOSIS — E11.9 TYPE 2 DIABETES MELLITUS WITHOUT COMPLICATION, WITHOUT LONG-TERM CURRENT USE OF INSULIN (HCC): Primary | ICD-10-CM

## 2020-06-23 DIAGNOSIS — E55.9 VITAMIN D DEFICIENCY: ICD-10-CM

## 2020-06-23 DIAGNOSIS — M62.830 SPASM OF THORACIC BACK MUSCLE: ICD-10-CM

## 2020-06-23 DIAGNOSIS — E78.2 MIXED HYPERLIPIDEMIA: ICD-10-CM

## 2020-06-23 PROCEDURE — 3074F SYST BP LT 130 MM HG: CPT | Performed by: NURSE PRACTITIONER

## 2020-06-23 PROCEDURE — 1036F TOBACCO NON-USER: CPT | Performed by: NURSE PRACTITIONER

## 2020-06-23 PROCEDURE — 3046F HEMOGLOBIN A1C LEVEL >9.0%: CPT | Performed by: NURSE PRACTITIONER

## 2020-06-23 PROCEDURE — 2022F DILAT RTA XM EVC RTNOPTHY: CPT | Performed by: NURSE PRACTITIONER

## 2020-06-23 PROCEDURE — 36415 COLL VENOUS BLD VENIPUNCTURE: CPT | Performed by: NURSE PRACTITIONER

## 2020-06-23 PROCEDURE — 3079F DIAST BP 80-89 MM HG: CPT | Performed by: NURSE PRACTITIONER

## 2020-06-23 PROCEDURE — 3008F BODY MASS INDEX DOCD: CPT | Performed by: NURSE PRACTITIONER

## 2020-06-23 PROCEDURE — 99214 OFFICE O/P EST MOD 30 MIN: CPT | Performed by: NURSE PRACTITIONER

## 2020-06-23 RX ORDER — BACLOFEN 10 MG/1
10 TABLET ORAL
Qty: 30 TABLET | Refills: 0 | Status: SHIPPED | OUTPATIENT
Start: 2020-06-23 | End: 2020-08-10 | Stop reason: SDUPTHER

## 2020-06-25 LAB
25(OH)D3+25(OH)D2 SERPL-MCNC: 29.9 NG/ML (ref 30–100)
ALBUMIN SERPL-MCNC: 4.5 G/DL (ref 4–5)
ALBUMIN/GLOB SERPL: 1.7 {RATIO} (ref 1.2–2.2)
ALP SERPL-CCNC: 55 IU/L (ref 39–117)
ALT SERPL-CCNC: 78 IU/L (ref 0–44)
AST SERPL-CCNC: 36 IU/L (ref 0–40)
BILIRUB SERPL-MCNC: 0.4 MG/DL (ref 0–1.2)
BUN SERPL-MCNC: 16 MG/DL (ref 6–24)
BUN/CREAT SERPL: 16 (ref 9–20)
CALCIUM SERPL-MCNC: 9.3 MG/DL (ref 8.7–10.2)
CHLORIDE SERPL-SCNC: 103 MMOL/L (ref 96–106)
CHOLEST SERPL-MCNC: 155 MG/DL (ref 100–199)
CHOLEST/HDLC SERPL: 5.5 RATIO (ref 0–5)
CO2 SERPL-SCNC: 22 MMOL/L (ref 20–29)
CREAT SERPL-MCNC: 1.02 MG/DL (ref 0.76–1.27)
EST. AVERAGE GLUCOSE BLD GHB EST-MCNC: 209 MG/DL
GLOBULIN SER-MCNC: 2.6 G/DL (ref 1.5–4.5)
GLUCOSE SERPL-MCNC: 248 MG/DL (ref 65–99)
HBA1C MFR BLD: 8.9 % (ref 4.8–5.6)
HDLC SERPL-MCNC: 28 MG/DL
LDLC SERPL CALC-MCNC: 76 MG/DL (ref 0–99)
LDLC SERPL DIRECT ASSAY-MCNC: 89 MG/DL (ref 0–99)
POTASSIUM SERPL-SCNC: 4.1 MMOL/L (ref 3.5–5.2)
PROT SERPL-MCNC: 7.1 G/DL (ref 6–8.5)
SL AMB EGFR AFRICAN AMERICAN: 100 ML/MIN/1.73
SL AMB EGFR NON AFRICAN AMERICAN: 87 ML/MIN/1.73
SL AMB VLDL CHOLESTEROL CALC: 51 MG/DL (ref 5–40)
SODIUM SERPL-SCNC: 140 MMOL/L (ref 134–144)
TRIGL SERPL-MCNC: 253 MG/DL (ref 0–149)

## 2020-06-25 PROCEDURE — 3052F HG A1C>EQUAL 8.0%<EQUAL 9.0%: CPT | Performed by: NURSE PRACTITIONER

## 2020-06-29 ENCOUNTER — TELEMEDICINE (OUTPATIENT)
Dept: FAMILY MEDICINE CLINIC | Facility: CLINIC | Age: 50
End: 2020-06-29
Payer: COMMERCIAL

## 2020-06-29 VITALS — WEIGHT: 269 LBS | HEIGHT: 69 IN | BODY MASS INDEX: 39.84 KG/M2

## 2020-06-29 DIAGNOSIS — E78.2 MIXED HYPERLIPIDEMIA: ICD-10-CM

## 2020-06-29 DIAGNOSIS — E78.1 HYPERTRIGLYCERIDEMIA: ICD-10-CM

## 2020-06-29 DIAGNOSIS — E11.9 TYPE 2 DIABETES MELLITUS WITHOUT COMPLICATION, WITHOUT LONG-TERM CURRENT USE OF INSULIN (HCC): Primary | ICD-10-CM

## 2020-06-29 DIAGNOSIS — E55.9 VITAMIN D DEFICIENCY: ICD-10-CM

## 2020-06-29 PROCEDURE — 3008F BODY MASS INDEX DOCD: CPT | Performed by: NURSE PRACTITIONER

## 2020-06-29 PROCEDURE — 99213 OFFICE O/P EST LOW 20 MIN: CPT | Performed by: NURSE PRACTITIONER

## 2020-06-29 PROCEDURE — 3079F DIAST BP 80-89 MM HG: CPT | Performed by: NURSE PRACTITIONER

## 2020-06-29 PROCEDURE — 3074F SYST BP LT 130 MM HG: CPT | Performed by: NURSE PRACTITIONER

## 2020-06-29 PROCEDURE — 3046F HEMOGLOBIN A1C LEVEL >9.0%: CPT | Performed by: NURSE PRACTITIONER

## 2020-06-29 PROCEDURE — 1036F TOBACCO NON-USER: CPT | Performed by: NURSE PRACTITIONER

## 2020-06-29 PROCEDURE — 2022F DILAT RTA XM EVC RTNOPTHY: CPT | Performed by: NURSE PRACTITIONER

## 2020-06-29 RX ORDER — BLOOD-GLUCOSE METER
EACH MISCELLANEOUS
COMMUNITY
Start: 2020-06-24

## 2020-06-29 RX ORDER — FENOFIBRATE 54 MG/1
54 TABLET ORAL DAILY
Qty: 30 TABLET | Refills: 3 | Status: SHIPPED | OUTPATIENT
Start: 2020-06-29 | End: 2020-09-10 | Stop reason: SDUPTHER

## 2020-06-29 RX ORDER — LANCETS 33 GAUGE
EACH MISCELLANEOUS
COMMUNITY
Start: 2020-06-23

## 2020-06-29 RX ORDER — BLOOD SUGAR DIAGNOSTIC
STRIP MISCELLANEOUS
COMMUNITY
Start: 2020-06-23

## 2020-07-13 DIAGNOSIS — E78.2 MIXED HYPERLIPIDEMIA: ICD-10-CM

## 2020-07-13 RX ORDER — ATORVASTATIN CALCIUM 20 MG/1
TABLET, FILM COATED ORAL
Qty: 30 TABLET | Refills: 3 | Status: SHIPPED | OUTPATIENT
Start: 2020-07-13 | End: 2020-09-10 | Stop reason: SDUPTHER

## 2020-07-20 ENCOUNTER — TELEPHONE (OUTPATIENT)
Dept: FAMILY MEDICINE CLINIC | Facility: CLINIC | Age: 50
End: 2020-07-20

## 2020-07-20 ENCOUNTER — TRANSCRIBE ORDERS (OUTPATIENT)
Dept: URGENT CARE | Facility: CLINIC | Age: 50
End: 2020-07-20

## 2020-07-20 ENCOUNTER — TELEMEDICINE (OUTPATIENT)
Dept: FAMILY MEDICINE CLINIC | Facility: CLINIC | Age: 50
End: 2020-07-20
Payer: COMMERCIAL

## 2020-07-20 VITALS — HEIGHT: 69 IN | WEIGHT: 265 LBS | BODY MASS INDEX: 39.25 KG/M2

## 2020-07-20 DIAGNOSIS — Z20.828 EXPOSURE TO SARS-ASSOCIATED CORONAVIRUS: Primary | ICD-10-CM

## 2020-07-20 DIAGNOSIS — Z20.822 COVID-19 RULED OUT: ICD-10-CM

## 2020-07-20 DIAGNOSIS — Z20.822 COVID-19 RULED OUT: Primary | ICD-10-CM

## 2020-07-20 DIAGNOSIS — Z20.828 EXPOSURE TO SARS-ASSOCIATED CORONAVIRUS: ICD-10-CM

## 2020-07-20 PROCEDURE — 99213 OFFICE O/P EST LOW 20 MIN: CPT | Performed by: NURSE PRACTITIONER

## 2020-07-20 PROCEDURE — U0003 INFECTIOUS AGENT DETECTION BY NUCLEIC ACID (DNA OR RNA); SEVERE ACUTE RESPIRATORY SYNDROME CORONAVIRUS 2 (SARS-COV-2) (CORONAVIRUS DISEASE [COVID-19]), AMPLIFIED PROBE TECHNIQUE, MAKING USE OF HIGH THROUGHPUT TECHNOLOGIES AS DESCRIBED BY CMS-2020-01-R: HCPCS

## 2020-07-20 PROCEDURE — 3008F BODY MASS INDEX DOCD: CPT | Performed by: NURSE PRACTITIONER

## 2020-07-20 NOTE — PROGRESS NOTES
COVID-19 Virtual Visit     Assessment/Plan:    Problem List Items Addressed This Visit     None      Visit Diagnoses     Exposure to SARS-associated coronavirus    -  Primary    Relevant Orders    MISC COVID-19 TEST- Collected at [facility] or [facility]        This virtual check-in was done via telephone  Disposition:      I referred eRno Vera to one of our centralized sites for a COVID-19 swab    I spent 10 minutes with patient today in which greater than 50% of the time was spent in counseling/coordination of care regarding covid testing, cdc rec    Encounter provider PHYLLIS Figueredo    Provider located at [place] [address]  [address] [address]    Recent Visits  No visits were found meeting these conditions  Showing recent visits within past 7 days and meeting all other requirements     Today's Visits  Date Type Provider Dept   07/20/20 Telemedicine PHYLLIS Figueredo [facility]   Showing today's visits and meeting all other requirements     Future Appointments  No visits were found meeting these conditions  Showing future appointments within next 150 days and meeting all other requirements        Patient agrees to participate in a virtual check in via telephone or video visit instead of presenting to the office to address urgent/immediate medical needs  Patient is aware this is a billable service  After connecting through telephone, the patient was identified by name and date of birth  Harry Cruz was informed that this was a telemedicine visit and that the exam was being conducted confidentially over secure lines  My office door was closed  No one else was in the room  Harry Cruz acknowledged consent and understanding of privacy and security of the telemedicine visit    I informed the patient that I have reviewed his record in Epic and presented the opportunity for him to ask any questions regarding the visit today  The patient agreed to participate  It was my intent to perform this visit via video technology but the patient was not able to do a video connection so the visit was completed via audio telephone only  Subjective  Jani Trotter is a 51 y o  male who is concerned about COVID-19  He reports no symptoms, requires screening  He has not experienced fever, cough, shortness of breath, headache, fatigue, myalgias, abdominal pain, diarrhea, nausea and vomiting He has had contact with a person who is under investigation for or who is positive for COVID-19 within the last 14 days  He has not been hospitalized recently for fever and/or lower respiratory symptoms      Past Medical History:   Diagnosis Date    Class 2 severe obesity due to excess calories with serious comorbidity and body mass index (BMI) of 39 0 to 39 9 in [facility]) 3/5/2019    [facility]    Essential hypertension 3/5/2019       Past Surgical History:   Procedure Laterality Date    FRACTURE SURGERY      left ankle, plates and screws        Current Outpatient Medications   Medication Sig Dispense Refill    aspirin (ECOTRIN LOW STRENGTH) 81 mg EC tablet Take 81 mg by mouth daily      atorvastatin (LIPITOR) 20 mg tablet take 1 tablet by mouth once daily 30 tablet 3    baclofen 10 mg tablet Take 1 tablet (10 mg total) by mouth daily at bedtime as needed for muscle spasms 30 tablet 0    Blood Glucose Monitoring Suppl (ONETOUCH VERIO FLEX SYSTEM) w/Device KIT CHECK BS DAILY      ergocalciferol (VITAMIN D2) 50,000 units Take 1 capsule (50,000 Units total) by mouth once a week 4 capsule 5    fenofibrate (TRICOR) 54 MG tablet Take 1 tablet (54 mg total) by mouth daily 30 tablet 3    Lancets (ONETOUCH DELICA PLUS [id number]) MISC use 1 LANCET to TEST BLOOD SUGAR once daily      lisinopril (ZESTRIL) 10 mg tablet take 1 tablet by mouth once daily 30 tablet 0    metFORMIN (GLUCOPHAGE) 500 mg tablet Take 2 tablets (1,000 mg total) by mouth 2 (two) times a day with meals 120 tablet 3    ONETOUCH VERIO test strip use 1 TEST STRIP to TEST BLOOD SUGAR once daily       No current facility-administered medications for this visit  Allergies   Allergen Reactions    No Known Allergies        Review of Systems   Constitutional: Negative for fever  Respiratory: Negative for cough, chest tightness and shortness of breath  Cardiovascular: Negative for chest pain and palpitations  Gastrointestinal: Negative for abdominal pain, diarrhea, nausea and vomiting  Musculoskeletal: Negative for arthralgias and myalgias  Neurological: Negative for dizziness and headaches  Video Exam    Vitals:    07/20/20 1326   Weight: 120 kg (265 lb)   Height: 5' 9" (1 753 m)         Renae Duckworth appears alert, no distress  Physical Exam   Constitutional: He is oriented to person, place, and time  No distress  Pulmonary/Chest:   No conversational dyspnea   Neurological: He is alert and oriented to person, place, and time  Psychiatric: He has a normal mood and affect  His behavior is normal  Thought content normal         VIRTUAL VISIT [facility] acknowledges that he has consented to an online visit or consultation  He understands that the online visit is based solely on information provided by him, and that, in the absence of a face-to-face physical evaluation by the physician, the diagnosis he receives is both limited and provisional in terms of accuracy and completeness  This is not intended to replace a full medical face-to-face evaluation by the physician  Feliberto Carrillo understands and accepts these terms

## 2020-07-21 NOTE — TELEPHONE ENCOUNTER
He does not need to quarantine  He was exposed, not symptomatic and it is not a household member that was his exposure

## 2020-07-21 NOTE — TELEPHONE ENCOUNTER
Pt called back and asked if he can still wor since he has been tested  Per ME verbal, yes he can go back to work   Pt aware, No Further Action Required

## 2020-07-25 LAB — SARS-COV-2 RNA SPEC QL NAA+PROBE: NOT DETECTED

## 2020-08-10 DIAGNOSIS — M62.830 SPASM OF THORACIC BACK MUSCLE: ICD-10-CM

## 2020-08-10 RX ORDER — BACLOFEN 10 MG/1
10 TABLET ORAL
Qty: 30 TABLET | Refills: 0 | Status: SHIPPED | OUTPATIENT
Start: 2020-08-10 | End: 2021-01-18 | Stop reason: SDUPTHER

## 2020-09-10 DIAGNOSIS — E78.1 HYPERTRIGLYCERIDEMIA: ICD-10-CM

## 2020-09-10 DIAGNOSIS — E55.9 VITAMIN D DEFICIENCY: ICD-10-CM

## 2020-09-10 DIAGNOSIS — E78.2 MIXED HYPERLIPIDEMIA: ICD-10-CM

## 2020-09-10 DIAGNOSIS — I10 ESSENTIAL HYPERTENSION: ICD-10-CM

## 2020-09-10 RX ORDER — ATORVASTATIN CALCIUM 20 MG/1
20 TABLET, FILM COATED ORAL DAILY
Qty: 30 TABLET | Refills: 0 | Status: SHIPPED | OUTPATIENT
Start: 2020-09-10 | End: 2020-10-09 | Stop reason: SDUPTHER

## 2020-09-10 RX ORDER — ERGOCALCIFEROL 1.25 MG/1
50000 CAPSULE ORAL WEEKLY
Qty: 4 CAPSULE | Refills: 0 | Status: SHIPPED | OUTPATIENT
Start: 2020-09-10 | End: 2020-10-09 | Stop reason: SDUPTHER

## 2020-09-10 RX ORDER — LISINOPRIL 10 MG/1
10 TABLET ORAL DAILY
Qty: 30 TABLET | Refills: 0 | Status: SHIPPED | OUTPATIENT
Start: 2020-09-10 | End: 2020-10-09 | Stop reason: SDUPTHER

## 2020-09-10 RX ORDER — FENOFIBRATE 54 MG/1
54 TABLET ORAL DAILY
Qty: 30 TABLET | Refills: 0 | Status: SHIPPED | OUTPATIENT
Start: 2020-09-10 | End: 2020-10-09 | Stop reason: SDUPTHER

## 2020-09-26 ENCOUNTER — AMB VIDEO VISIT (OUTPATIENT)
Dept: OTHER | Facility: HOSPITAL | Age: 50
End: 2020-09-26

## 2020-09-26 PROCEDURE — EVISIT: Performed by: SPECIALIST

## 2020-09-26 NOTE — CARE ANYWHERE EVISITS
Visit Summary for Bhargavi Hernandez - Gender: Male - Date of Birth: [id number]  Date: [id number] - Duration: 5 minutes  Patient: Bhargavi Hernandez  Provider: Behzad Weiss    Patient Contact Information  Address  [address] [place] [address]; [address] [place] [address]  [phone]    Visit Topics    Triage Questions   What is your current physical address in the event of a medical emergency? Answer []  Are you allergic to any medications? Answer []  Are you now or could you be pregnant? Answer []  Do you have any immune system compromise or chronic lung   disease? Answer []  Do you have any vulnerable family members in the home (infant, pregnant, cancer, elderly)? Answer []     Conversation Transcripts  [0A][0A] [Notification] Shaina Leone, Global Staff, will help you prepare for your visit  He is assisting Behzad Weiss Family Physician [0A][[place] [address], and thank you for connecting! Unfortunately, the provider you are waiting to connect   with is no longer available at this time  I will transfer you now to another qualified provider who will be able to assist you shortly  Thank you for your patience  [0A][Notification] Shaina Leone has left the room  [0A][Notification] You are connected   with [facility] [0A][Notification] Bhargavi Hernandez is located in [place]  [0A][Notification] Bhargavi Hernandez has shared health history  Mayi Clemente  [0A][Notification] Behzad Candle has added a prescription [0A][Notification] [employer] has added a prescription [0A][Notification] Behzad Candle has added a diagnosis/procedure code  [0A][Notification] Behzad Candle has added a diagnosis/procedure code  [0A]    Diagnosis  Oth anomalies of tooth position of fully erupted tooth/teeth    Procedures  Value: [id number] Code: CPT-4 ONLINE E/M BY PHYS/QHP    Medications Prescribed    ibuprofen  Dose : 1 tablet  Route : oral  Frequency : 3 times a day  Until directed to stop     Patient Instructions : take with food prn tooth pain  Refills : 0  Instructions to the Pharmacist : Substitutions allowed    Augmentin  Dose : 1 tablet  Route : oral  Frequency : every 12 hours  Until directed to stop  Patient Instructions : take with food  Refills : 0  Instructions to the Pharmacist : generic  thank you  Substitutions allowed      Provider Notes  [0A][0A] [0A]We strongly encourage you to share the following record of today's visit with your primary care physician  [0A][0A][0A][0A]Contact phone number: [phone][0A][0A][0A][0A]Mode of Communication: [place], [employer][0A][0A][0A][0A]HPI: R upper molar   pain, 24 hrs now, started around dinner and worsening nowno fever, no nausea[0A]rinses with mouthwash[0A]this is a new problem for him, he has had an abcess in mouth before[0A]he has good dental hygeine, not good about going to   dentist[0A][0A][0A][0A][0A][0A]PMH:  well male[0A]has a PCP[0A][0A]PSH:  [0A][0A]Meds: metformin, lipitor,  lisinopril, another chol med, vit D[0A][0A]Allergies: NKDA[0A][0A][0A][0A]Exam:   wt 270[0A][0A]Gen: Alert, normal mental status and interaction,   no visible distress, non- toxic appearance  [0A]R upper molar area, not the jaw and not the TMJ, there is tenderness to self pressing, no visible swelling noted, no skin color changes[0A]NT to self massage under the jaw on the R   side[0A][0A][0A][0A]Assessment:  Tooth abnormality, likely early abcess/infection[0A][0A][0A][0A]Plan:  educated about nature of this illness and typical course[0A]call dentist on [address] to get in this week[0A]rinse with salt water ad natali for the   infection[0A][0A]1  I am sending you a prescription as described below  [0A]rx augmentin 875 with food 2 x a d for 7d[0A]eat yogurt daily  and watch sun exposure when on ABX  if you use probiotics, you can restart them after you have finished your   antibiotic course, thank you! [0A]Rx ibuprofen 800 mg with food every 6 hrs while awake  as needed for tooth pain   [0A]2  Discussed precautions  [0A][0A][0A][0A]Follow up:[0A][0A]1  If there are any questions or problems with the prescription, call   [phone] anytime for assistance  [0A][0A]2  Please re-connect for another online visit or see an in-person provider should your symptoms worsen or persist   [0A][0A]4  Please print a copy of this note and send it to your regular doctor, or take it   to your next visit so it may be included in your medical record  [0A][0A][0A][0A]Patient voiced understanding and agrees to plan [0A][0A][0A][0A]Please see your PCP on an annual basis  [0A]    Electronically signed by: Adelfo Izaguirre [phone])

## 2020-09-29 ENCOUNTER — OFFICE VISIT (OUTPATIENT)
Dept: FAMILY MEDICINE CLINIC | Facility: CLINIC | Age: 50
End: 2020-09-29
Payer: COMMERCIAL

## 2020-09-29 VITALS
RESPIRATION RATE: 18 BRPM | WEIGHT: 264 LBS | DIASTOLIC BLOOD PRESSURE: 80 MMHG | TEMPERATURE: 97.6 F | BODY MASS INDEX: 39.1 KG/M2 | HEIGHT: 69 IN | SYSTOLIC BLOOD PRESSURE: 116 MMHG | HEART RATE: 78 BPM

## 2020-09-29 DIAGNOSIS — E78.2 MIXED HYPERLIPIDEMIA: ICD-10-CM

## 2020-09-29 DIAGNOSIS — E11.9 TYPE 2 DIABETES MELLITUS WITHOUT COMPLICATION, WITHOUT LONG-TERM CURRENT USE OF INSULIN (HCC): Primary | ICD-10-CM

## 2020-09-29 DIAGNOSIS — E66.01 CLASS 2 SEVERE OBESITY DUE TO EXCESS CALORIES WITH SERIOUS COMORBIDITY AND BODY MASS INDEX (BMI) OF 39.0 TO 39.9 IN ADULT (HCC): ICD-10-CM

## 2020-09-29 DIAGNOSIS — G47.33 OSA (OBSTRUCTIVE SLEEP APNEA): ICD-10-CM

## 2020-09-29 DIAGNOSIS — I10 ESSENTIAL HYPERTENSION: ICD-10-CM

## 2020-09-29 LAB — SL AMB POCT HEMOGLOBIN AIC: 6.8 (ref ?–6.5)

## 2020-09-29 PROCEDURE — 83036 HEMOGLOBIN GLYCOSYLATED A1C: CPT | Performed by: NURSE PRACTITIONER

## 2020-09-29 PROCEDURE — 1036F TOBACCO NON-USER: CPT | Performed by: NURSE PRACTITIONER

## 2020-09-29 PROCEDURE — 3079F DIAST BP 80-89 MM HG: CPT | Performed by: NURSE PRACTITIONER

## 2020-09-29 PROCEDURE — 3044F HG A1C LEVEL LT 7.0%: CPT | Performed by: NURSE PRACTITIONER

## 2020-09-29 PROCEDURE — 99214 OFFICE O/P EST MOD 30 MIN: CPT | Performed by: NURSE PRACTITIONER

## 2020-09-29 NOTE — PATIENT INSTRUCTIONS
Monitor blood sugar and bring diary of readings to next appointment  Carbohydrate controlled diet  Continue diabetic medications as ordered  Increase regular exercise: Consider such things as walking, biking, strength training , etc    Heart healthy diet- limit red meat, limit saturated fat, moderate salt intake, limit junk food, etc    Regular exercise  Stress management  Continue all medications as discussed  Routine labwork and screenings as ordered

## 2020-09-29 NOTE — PROGRESS NOTES
Assessment/Plan:  1  Type 2 diabetes mellitus without complication, without long-term current use of insulin ([facility])  Stable  Monitor blood sugar and bring diary of readings to next appointment  Carbohydrate controlled diet  Continue diabetic medications as ordered  Increase regular exercise: Consider such things as walking, biking, strength training , etc    - POCT hemoglobin A1c- 6 8  2  Essential hypertension  Stable  Continue blood pressure medications as ordered  Monitor blood pressure  Limit salt  3  Mixed hyperlipidemia  Heart healthy diet  Continue statin and fibrate  Will repeat labs in 6 months  4  Class 2 severe obesity due to excess calories with serious comorbidity and body mass index (BMI) of 39 0 to 39 9 in adult ([facility])  BMI Counseling: Body mass index is 38 99 kg/m²  The BMI is above normal  Nutrition recommendations include reducing portion sizes, decreasing overall calorie intake, moderation in carbohydrate intake, reducing intake of saturated fat and trans fat and reducing intake of cholesterol  Exercise recommendations include exercising 3-5 times per week  5  [place] (obstructive sleep apnea)  Encouraged to use cpap regularly  Discussed risks of untreated sleep apnea    Patient was counseled regarding instructions for management which included: impression/diagnosis, risk/benefits of treatment options, importance of compliance with treatment, risk factor reduction, and prognosis  I have reviewed the instructions with the patient answering all questions and patient verbalized understanding  Subjective:      Patient ID: Jani Trotter is a 51 y o  male who presents for follow up    Here for follow up on chronic conditions  DM on metformin  Has been taking faithfully twice daily since last appt     HTN on lisinopril- does not monitor bp  Hyperlipidemia/hypertriglyceridemia- on statin, on fibrates  Vit D def on rx supplement, taking weekly  Tolerating all meds with no side effects  Has sleep apnea but does not use cpap regularly  Feels well  No daytime fatigue  No recent illness  No other issues or concerns  The following portions of the patient's history were reviewed and updated as appropriate: allergies, current medications, past family history, past medical history, past social history, past surgical history and problem list     Review of Systems   Constitutional: Negative for activity change, appetite change and unexpected weight change  Respiratory: Negative for chest tightness and shortness of breath  Cardiovascular: Negative for chest pain, palpitations and leg swelling  Gastrointestinal: Negative for abdominal pain  Endocrine: Negative for cold intolerance, heat intolerance, polydipsia, polyphagia and polyuria  Neurological: Negative for dizziness and headaches  Psychiatric/Behavioral: Negative for self-injury and suicidal ideas  The patient is not nervous/anxious  Objective:  hgba1c 6 8    /80 (BP Location: Right arm, Patient Position: Sitting, Cuff Size: Extra-Large)   Pulse 78   Temp 97 6 °F (36 4 °C) (Temporal)   Resp 18   Ht 5' 9" (1 753 m)   Wt 120 kg (264 lb)   BMI 38 99 kg/m²          Physical Exam  Constitutional:       General: He is not in acute distress  Appearance: He is obese  He is not ill-appearing  Neck:      Musculoskeletal: Normal range of motion and neck supple  Vascular: No carotid bruit  Comments: No JVD  Cardiovascular:      Rate and Rhythm: Normal rate and regular rhythm  Pulmonary:      Effort: Pulmonary effort is normal  No respiratory distress  Breath sounds: Normal breath sounds  No wheezing or rhonchi  Musculoskeletal:      Right lower leg: No edema  Left lower leg: No edema  Skin:     General: Skin is warm and dry  Coloration: Skin is not jaundiced or pale  Neurological:      General: No focal deficit present        Mental Status: He is alert and oriented to person, place, and time       Cranial Nerves: No cranial nerve deficit  Sensory: No sensory deficit  Psychiatric:         Mood and Affect: Mood normal          Behavior: Behavior normal          Thought Content:  Thought content normal          Judgment: Judgment normal

## 2020-10-09 DIAGNOSIS — E55.9 VITAMIN D DEFICIENCY: ICD-10-CM

## 2020-10-09 DIAGNOSIS — E78.2 MIXED HYPERLIPIDEMIA: ICD-10-CM

## 2020-10-09 DIAGNOSIS — E11.9 TYPE 2 DIABETES MELLITUS WITHOUT COMPLICATION, WITHOUT LONG-TERM CURRENT USE OF INSULIN (HCC): ICD-10-CM

## 2020-10-09 DIAGNOSIS — E78.1 HYPERTRIGLYCERIDEMIA: ICD-10-CM

## 2020-10-09 DIAGNOSIS — I10 ESSENTIAL HYPERTENSION: ICD-10-CM

## 2020-10-12 PROCEDURE — 4010F ACE/ARB THERAPY RXD/TAKEN: CPT | Performed by: NURSE PRACTITIONER

## 2020-10-12 RX ORDER — LISINOPRIL 10 MG/1
10 TABLET ORAL DAILY
Qty: 90 TABLET | Refills: 1 | Status: SHIPPED | OUTPATIENT
Start: 2020-10-12 | End: 2021-02-02 | Stop reason: SDUPTHER

## 2020-10-12 RX ORDER — ATORVASTATIN CALCIUM 20 MG/1
20 TABLET, FILM COATED ORAL DAILY
Qty: 90 TABLET | Refills: 1 | Status: SHIPPED | OUTPATIENT
Start: 2020-10-12 | End: 2021-02-02 | Stop reason: SDUPTHER

## 2020-10-12 RX ORDER — ERGOCALCIFEROL 1.25 MG/1
50000 CAPSULE ORAL WEEKLY
Qty: 12 CAPSULE | Refills: 1 | Status: SHIPPED | OUTPATIENT
Start: 2020-10-12 | End: 2021-02-02 | Stop reason: SDUPTHER

## 2020-10-12 RX ORDER — FENOFIBRATE 54 MG/1
54 TABLET ORAL DAILY
Qty: 90 TABLET | Refills: 1 | Status: SHIPPED | OUTPATIENT
Start: 2020-10-12 | End: 2021-02-02 | Stop reason: SDUPTHER

## 2020-11-19 ENCOUNTER — TELEPHONE (OUTPATIENT)
Dept: FAMILY MEDICINE CLINIC | Facility: CLINIC | Age: 50
End: 2020-11-19

## 2020-11-19 ENCOUNTER — TELEMEDICINE (OUTPATIENT)
Dept: FAMILY MEDICINE CLINIC | Facility: CLINIC | Age: 50
End: 2020-11-19
Payer: COMMERCIAL

## 2020-11-19 VITALS — WEIGHT: 260 LBS | HEIGHT: 69 IN | TEMPERATURE: 97.2 F | BODY MASS INDEX: 38.51 KG/M2

## 2020-11-19 DIAGNOSIS — M62.838 MUSCLE SPASM: Primary | ICD-10-CM

## 2020-11-19 DIAGNOSIS — B34.9 VIRAL ILLNESS: ICD-10-CM

## 2020-11-19 PROCEDURE — 3008F BODY MASS INDEX DOCD: CPT | Performed by: NURSE PRACTITIONER

## 2020-11-19 PROCEDURE — 1036F TOBACCO NON-USER: CPT | Performed by: NURSE PRACTITIONER

## 2020-11-19 PROCEDURE — 99213 OFFICE O/P EST LOW 20 MIN: CPT | Performed by: NURSE PRACTITIONER

## 2020-11-19 RX ORDER — BACLOFEN 10 MG/1
10 TABLET ORAL 3 TIMES DAILY PRN
Qty: 30 TABLET | Refills: 1 | Status: SHIPPED | OUTPATIENT
Start: 2020-11-19 | End: 2021-02-02 | Stop reason: SDUPTHER

## 2020-12-17 DIAGNOSIS — E11.9 TYPE 2 DIABETES MELLITUS WITHOUT COMPLICATION, WITHOUT LONG-TERM CURRENT USE OF INSULIN (HCC): ICD-10-CM

## 2021-01-18 ENCOUNTER — TELEMEDICINE (OUTPATIENT)
Dept: FAMILY MEDICINE CLINIC | Facility: CLINIC | Age: 51
End: 2021-01-18
Payer: COMMERCIAL

## 2021-01-18 VITALS — TEMPERATURE: 97.3 F

## 2021-01-18 DIAGNOSIS — Z20.822 EXPOSURE TO COVID-19 VIRUS: Primary | ICD-10-CM

## 2021-01-18 DIAGNOSIS — Z20.822 EXPOSURE TO COVID-19 VIRUS: ICD-10-CM

## 2021-01-18 PROCEDURE — 99213 OFFICE O/P EST LOW 20 MIN: CPT | Performed by: NURSE PRACTITIONER

## 2021-01-18 PROCEDURE — 87635 SARS-COV-2 COVID-19 AMP PRB: CPT | Performed by: NURSE PRACTITIONER

## 2021-01-18 NOTE — PROGRESS NOTES
COVID-19 Virtual Visit     Assessment/Plan:  1  Exposure to COVID-19 virus  - Novel Coronavirus (Covid-19),PCR [facility] or [facility]; Future    Refer to CDC  gov for specific information and answers to many frequently asked questions  You are being tested for Covid19 due to recent high risk exposure   If Covid-19 test is positive, self isolation for 10 days from onset of symptoms and 24 hours with no symptoms and no fever without use of anti-pyrectic medications (ie: Tylenol, Ibuprofen, Advil, etc ) before discontinuing self isolation and/or return to work or normal activities  If you test positive, even with no symptoms, you need to isolate for a full 10 days after positive test    While in self isolation you should be using a private bathroom, sleeping area, mask in the home if any other people are present, no shared eating utensils, etc    Deep breathing exercises to expand lung fields  Monitor temperature daily or if you feel you have a fever  Tylenol or Advil/Ibuprofen is appropriate for fever control  Check pulse oximetry,  if able,  daily and if you are having any increased shortness of breath or chest tightness  Call the office if you are having any increased respiratory symptoms or if pulse oximetry is persistently less than 92%  Current recommendations for treatment include daily multivitamin, vitamin C, zinc, and vitamin D supplementation  10 day quarantine is recommended for any high risk exposures , starting on date of initial exposure  High risk exposure is defined as being within 6 feet for 15 minutes cumulatively over 24 hours with a Covid positive patient unmasked  Social distancing is imperative  The use of a mask in public places is recommended going forward       CDC emergency warning signs for when to seek medical attention immediately:  - difficulty breathing or shortness of breath  - persistent pain or pressure in the chest   - new confusion or inability to arouse  - bluish lips or face   If you should need to seek medical care, you should notify the ED or EMS that you are under investigation and/or positive for COVID-19 prior and wear a facemask if possible, scarf or bandana     Problem List Items Addressed This Visit     None      Visit Diagnoses     Exposure to COVID-19 virus    -  Primary    Relevant Orders    Novel Coronavirus (Covid-19),PCR [facility] - Collected at [facility] or [facility]         Disposition:     I referred patient to one of our centralized sites for a COVID-19 swab  Exposure covid 1/6 and 1/8    I have spent 15 minutes directly with the patient  Greater than 50% of this time was spent in counseling/coordination of care regarding: instructions for management, patient and family education and impressions  Encounter provider PHYLLIS Blanc    Provider located at [place] [address]  [address] [address]    Recent Visits  No visits were found meeting these conditions  Showing recent visits within past 7 days and meeting all other requirements     Today's Visits  Date Type Provider Dept   01/18/21 Telemedicine PHYLLIS Blanc [facility]   Showing today's visits and meeting all other requirements     Future Appointments  No visits were found meeting these conditions  Showing future appointments within next 150 days and meeting all other requirements      This virtual check-in was done via [employer] and patient was informed that this is a secure, HIPAA-compliant platform  He agrees to proceed  Patient agrees to participate in a virtual check in via telephone or video visit instead of presenting to the office to address urgent/immediate medical needs  Patient is aware this is a billable service  After connecting through [facility], the patient was identified by name and date of birth   Taj Walters was informed that this was a telemedicine visit and that the exam was being conducted confidentially over secure lines  My office door was closed  No one else was in the room  Annie Duenas acknowledged consent and understanding of privacy and security of the telemedicine visit  I informed the patient that I have reviewed his record in Epic and presented the opportunity for him to ask any questions regarding the visit today  The patient agreed to participate  Subjective:   Annie Duenas is a 53 y o  male who is concerned about COVID-19  Patient is currently asymptomatic  Patient denies fever, chills, fatigue, malaise, congestion, rhinorrhea, sore throat, anosmia, loss of taste, cough, shortness of breath, chest tightness, abdominal pain, nausea, vomiting, diarrhea, myalgias and headaches  Date of exposure: 1/8/2021    Exposure:   Contact with a person who is under investigation (PUI) for or who is positive for COVID-19 within the last 14 days?: Yes    Hospitalized recently for fever and/or lower respiratory symptoms?: No      Currently a healthcare worker that is involved in direct patient care?: No      Works in a special setting where the risk of COVID-19 transmission may be high? (this may include long-term care, correctional and [facility] facilities; homeless shelters; assisted-living facilities and group homes ): No      Resident in a special setting where the risk of COVID-19 transmission may be high? (this may include long-term care, correctional and [facility] facilities; homeless shelters; assisted-living facilities and group homes ): No      Asymptomatic  Roommate and 2 of her children tested positive covid  Exposed on 1/6 and 1/8  Unmasked, many hours, lives with them     Has been quarantining  Would like to be tested  Feels fine      Lab Results   Component Value Date    SARSCOV2 Not Detected 07/20/2020     Past Medical History:   Diagnosis Date    Class 2 severe obesity due to excess calories with serious comorbidity and body mass index (BMI) of 39 0 to 39 9 in adult [facility]) 3/5/2019    Diabetes 1 5, managed as type 2 ([facility] [address] ) 3/5/2019    Essential hypertension 3/5/2019     Past Surgical History:   Procedure Laterality Date    FRACTURE SURGERY      left ankle, plates and screws      Current Outpatient Medications   Medication Sig Dispense Refill    ASPIRIN 81 PO Take by mouth daily      atorvastatin (LIPITOR) 20 mg tablet Take 1 tablet (20 mg total) by mouth daily 90 tablet 1    baclofen 10 mg tablet Take 1 tablet (10 mg total) by mouth 3 (three) times a day as needed for muscle spasms 30 tablet 1    Blood Glucose Monitoring Suppl (ONETOUCH VERIO FLEX SYSTEM) w/Device KIT CHECK BS DAILY      ergocalciferol (VITAMIN D2) 50,000 units Take 1 capsule (50,000 Units total) by mouth once a week 12 capsule 1    fenofibrate (TRICOR) 54 MG tablet Take 1 tablet (54 mg total) by mouth daily 90 tablet 1    Lancets (ONETOUCH DELICA PLUS PMTSDE56R) MISC use 1 LANCET to TEST BLOOD SUGAR once daily      lisinopril (ZESTRIL) 10 mg tablet Take 1 tablet (10 mg total) by mouth daily 90 tablet 1    metFORMIN (GLUCOPHAGE) 500 mg tablet take 2 tablets by mouth twice a day with meals 360 tablet 1    ONETOUCH VERIO test strip use 1 TEST STRIP to TEST BLOOD SUGAR once daily       No current facility-administered medications for this visit  Allergies   Allergen Reactions    No Known Allergies        Review of Systems   Constitutional: Negative for chills, fatigue and fever  HENT: Negative for congestion, rhinorrhea and sore throat  Respiratory: Negative for cough, chest tightness and shortness of breath  Gastrointestinal: Negative for abdominal pain, diarrhea, nausea and vomiting  Musculoskeletal: Negative for myalgias  Neurological: Negative for headaches  Objective:    Vitals:    01/18/21 0842   Temp: (!) 97 3 °F (36 3 °C)   TempSrc: Temporal       Physical Exam  Constitutional:       Appearance: He is not ill-appearing     Eyes:      General: No scleral icterus  Skin:     Coloration: Skin is not jaundiced or pale  Neurological:      Mental Status: He is alert and oriented to person, place, and time  Psychiatric:         Mood and Affect: Mood normal          Behavior: Behavior normal          Thought Content: Thought content normal          Judgment: Judgment normal        VIRTUAL VISIT [address]  [facility] acknowledges that he has consented to an online visit or consultation  He understands that the online visit is based solely on information provided by him, and that, in the absence of a face-to-face physical evaluation by the physician, the diagnosis he receives is both limited and provisional in terms of accuracy and completeness  This is not intended to replace a full medical face-to-face evaluation by the physician  Madonna Matias understands and accepts these terms

## 2021-01-18 NOTE — PATIENT INSTRUCTIONS
Refer to [facility]  [facility] for specific information and answers to many frequently asked questions  You are being tested for Covid19 due to recent high risk exposure   If Covid-19 test is positive, self isolation for 10 days from onset of symptoms and 24 hours with no symptoms and no fever without use of anti-pyrectic medications (ie: Tylenol, Ibuprofen, Advil, etc ) before discontinuing self isolation and/or return to work or normal activities  If you test positive, even with no symptoms, you need to isolate for a full 10 days after positive test    While in self isolation you should be using a private bathroom, sleeping area, mask in the home if any other people are present, no shared eating utensils, etc    Deep breathing exercises to expand lung fields  Monitor temperature daily or if you feel you have a fever  Tylenol or Advil/Ibuprofen is appropriate for fever control  Check pulse oximetry,  if able,  daily and if you are having any increased shortness of breath or chest tightness  Call the office if you are having any increased respiratory symptoms or if pulse oximetry is persistently less than 92%  Current recommendations for treatment include daily multivitamin, vitamin C, zinc, and vitamin D supplementation  10 day quarantine is recommended for any high risk exposures , starting on date of initial exposure  High risk exposure is defined as being within 6 feet for 15 minutes cumulatively over 24 hours with a Covid positive patient unmasked  Social distancing is imperative  The use of a mask in public places is recommended going forward       CDC emergency warning signs for when to seek medical attention immediately:  - difficulty breathing or shortness of breath  - persistent pain or pressure in the chest   - new confusion or inability to arouse  - bluish lips or face   If you should need to seek medical care, you should notify the ED or EMS that you are under investigation and/or positive for COVID-19 prior and wear a facemask if possible, scarf or bandana

## 2021-01-19 LAB — SARS-COV-2 RNA RESP QL NAA+PROBE: NEGATIVE

## 2021-01-20 ENCOUNTER — TELEMEDICINE (OUTPATIENT)
Dept: FAMILY MEDICINE CLINIC | Facility: CLINIC | Age: 51
End: 2021-01-20
Payer: COMMERCIAL

## 2021-01-20 DIAGNOSIS — Z20.822 EXPOSURE TO COVID-19 VIRUS: Primary | ICD-10-CM

## 2021-01-20 PROCEDURE — 99212 OFFICE O/P EST SF 10 MIN: CPT | Performed by: NURSE PRACTITIONER

## 2021-01-20 NOTE — PROGRESS NOTES
COVID-19 Virtual Visit     Assessment/Plan:  1  Exposure to COVID-19 virus  Anticipatory guidance  Reviewed CDC recs  Pt has completed 10 day quarantine since exposure date  Recent Results (from the past 672 hour(s))   Novel Coronavirus (Covid-19),PCR [facility] - Collected at [facility] or [facility] Now    Collection Time: 01/18/21 10:51 AM    Specimen: Nose; Nares   Result Value Ref Range    SARS-CoV-2 Negative Negative       Problem List Items Addressed This Visit     None      Visit Diagnoses     Exposure to COVID-19 virus    -  Primary         Disposition:     I recommended continued isolation until at least 24 hours have passed since recovery defined as resolution of fever without the use of fever-reducing medications AND improvement in COVID symptoms AND 10 days have passed since onset of symptoms (or 10 days have passed since date of first positive viral diagnostic test for asymptomatic patients)  aymptomatic but was exposed    I have spent 10 minutes directly with the patient  Greater than 50% of this time was spent in counseling/coordination of care regarding: diagnostic results, instructions for management, patient and family education and impressions  Encounter provider PHYLLIS Chowdhury    Provider located at [place] [address]  [phone] [facility] [id number]    Recent Visits  Date Type Provider Dept   01/18/21 PHYLLIS Hudson Pg   Showing recent visits within past 7 days and meeting all other requirements     Today's Visits  Date Type Provider Dept   01/20/21 Telemedicine PHYLLIS Chowdhury Pg   Showing today's visits and meeting all other requirements     Future Appointments  No visits were found meeting these conditions     Showing future appointments within next 150 days and meeting all other requirements      This virtual check-in was done via [employer] and patient was informed that this is a secure, HIPAA-compliant platform  He agrees to proceed  Patient agrees to participate in a virtual check in via telephone or video visit instead of presenting to the office to address urgent/immediate medical needs  Patient is aware this is a billable service  After connecting through [place], the patient was identified by name and date of birth  Librado Sykes was informed that this was a telemedicine visit and that the exam was being conducted confidentially over secure lines  My office door was closed  No one else was in the room  Librado Sykes acknowledged consent and understanding of privacy and security of the telemedicine visit  I informed the patient that I have reviewed his record in Epic and presented the opportunity for him to ask any questions regarding the visit today  The patient agreed to participate  Subjective:   Librado Sykes is a 53 y o  male who has been screened for COVID-19  Symptom change since last report: unchanged  Patient is currently asymptomatic  Patient denies fever, chills, fatigue, malaise, congestion, rhinorrhea, sore throat, anosmia, loss of taste, cough, shortness of breath, chest tightness, abdominal pain, nausea, vomiting, diarrhea, myalgias and headaches  Viji Carter has been staying home and has isolated themselves in his home  He is taking care to not share personal items and is cleaning all surfaces that are touched often, like counters, tabletops, and doorknobs using household cleaning sprays or wipes  He is wearing a mask when he leaves his room  Date of exposure: 1/8/2021    asymptomatic  Roommate and her children were positive  Last exposure was 1/8  Has done 10 day isolation     Tested 1/18, negative  Feels fine    Lab Results   Component Value Date    SARSCOV2 Negative 01/18/2021    SARSCOV2 Not Detected 07/20/2020     Past Medical History:   Diagnosis Date    Class 2 severe obesity due to excess calories with serious comorbidity and body mass index (BMI) of 39 0 to 39 9 in adult [facility]) 3/5/2019    Diabetes 1 5, managed as type 2 ([address] ) 3/5/2019    Essential hypertension 3/5/2019     Past Surgical History:   Procedure Laterality Date    FRACTURE SURGERY      left ankle, plates and screws      Current Outpatient Medications   Medication Sig Dispense Refill    ASPIRIN 81 PO Take by mouth daily      atorvastatin (LIPITOR) 20 mg tablet Take 1 tablet (20 mg total) by mouth daily 90 tablet 1    baclofen 10 mg tablet Take 1 tablet (10 mg total) by mouth 3 (three) times a day as needed for muscle spasms 30 tablet 1    Blood Glucose Monitoring Suppl (ONETOUCH VERIO FLEX SYSTEM) w/Device KIT CHECK BS DAILY      ergocalciferol (VITAMIN D2) 50,000 units Take 1 capsule (50,000 Units total) by mouth once a week 12 capsule 1    fenofibrate (TRICOR) 54 MG tablet Take 1 tablet (54 mg total) by mouth daily 90 tablet 1    Lancets (ONETOUCH DELICA PLUS [id number]) MISC use 1 LANCET to TEST BLOOD SUGAR once daily      lisinopril (ZESTRIL) 10 mg tablet Take 1 tablet (10 mg total) by mouth daily 90 tablet 1    metFORMIN (GLUCOPHAGE) 500 mg tablet take 2 tablets by mouth twice a day with meals 360 tablet 1    ONETOUCH VERIO test strip use 1 TEST STRIP to TEST BLOOD SUGAR once daily       No current facility-administered medications for this visit  Allergies   Allergen Reactions    No Known Allergies        Review of Systems   Constitutional: Negative for chills, fatigue and fever  HENT: Negative for congestion, rhinorrhea and sore throat  Respiratory: Negative for cough, chest tightness and shortness of breath  Gastrointestinal: Negative for abdominal pain, diarrhea, nausea and vomiting  Musculoskeletal: Negative for myalgias  Neurological: Negative for headaches  Objective: There were no vitals filed for this visit  Physical Exam  Constitutional:       Appearance: He is not ill-appearing  HENT:      Head: Normocephalic     Eyes: General: No scleral icterus  Pulmonary:      Effort: Pulmonary effort is normal  No respiratory distress  Comments: No conversational dyspnea  Skin:     Coloration: Skin is not jaundiced or pale  Neurological:      Mental Status: He is alert and oriented to person, place, and time  Psychiatric:         Mood and Affect: Mood normal          Behavior: Behavior normal        VIRTUAL VISIT [address]  [facility] acknowledges that he has consented to an online visit or consultation  He understands that the online visit is based solely on information provided by him, and that, in the absence of a face-to-face physical evaluation by the physician, the diagnosis he receives is both limited and provisional in terms of accuracy and completeness  This is not intended to replace a full medical face-to-face evaluation by the physician  Anand Akins understands and accepts these terms

## 2021-01-20 NOTE — LETTER
January 20, 2021    Patient:  Anand Akins  YOB: 1970  Date of Last Encounter: 1/18/2021    To whom it may concern:    Anand Akins has tested negative for COVID-19 (Coronavirus)  He may return to work on 1/21/2021      Sincerely,        PHYLLIS Cisneros

## 2021-02-02 DIAGNOSIS — E11.9 TYPE 2 DIABETES MELLITUS WITHOUT COMPLICATION, WITHOUT LONG-TERM CURRENT USE OF INSULIN (HCC): ICD-10-CM

## 2021-02-02 DIAGNOSIS — I10 ESSENTIAL HYPERTENSION: ICD-10-CM

## 2021-02-02 DIAGNOSIS — E78.1 HYPERTRIGLYCERIDEMIA: ICD-10-CM

## 2021-02-02 DIAGNOSIS — E78.2 MIXED HYPERLIPIDEMIA: ICD-10-CM

## 2021-02-02 DIAGNOSIS — E55.9 VITAMIN D DEFICIENCY: ICD-10-CM

## 2021-02-02 DIAGNOSIS — M62.838 MUSCLE SPASM: ICD-10-CM

## 2021-02-02 NOTE — TELEPHONE ENCOUNTER
LM for pt, all meds should have one more refill available at [facility]

## 2021-02-03 RX ORDER — FENOFIBRATE 54 MG/1
54 TABLET ORAL DAILY
Qty: 90 TABLET | Refills: 0 | Status: SHIPPED | OUTPATIENT
Start: 2021-02-03 | End: 2021-03-30 | Stop reason: SDUPTHER

## 2021-02-03 RX ORDER — BACLOFEN 10 MG/1
10 TABLET ORAL 3 TIMES DAILY PRN
Qty: 30 TABLET | Refills: 0 | Status: SHIPPED | OUTPATIENT
Start: 2021-02-03 | End: 2022-02-01 | Stop reason: ALTCHOICE

## 2021-02-03 RX ORDER — ERGOCALCIFEROL 1.25 MG/1
50000 CAPSULE ORAL WEEKLY
Qty: 12 CAPSULE | Refills: 0 | Status: SHIPPED | OUTPATIENT
Start: 2021-02-03 | End: 2021-03-30 | Stop reason: SDUPTHER

## 2021-02-03 RX ORDER — LISINOPRIL 10 MG/1
10 TABLET ORAL DAILY
Qty: 90 TABLET | Refills: 0 | Status: SHIPPED | OUTPATIENT
Start: 2021-02-03 | End: 2021-03-30 | Stop reason: SDUPTHER

## 2021-02-03 RX ORDER — ATORVASTATIN CALCIUM 20 MG/1
20 TABLET, FILM COATED ORAL DAILY
Qty: 90 TABLET | Refills: 0 | Status: SHIPPED | OUTPATIENT
Start: 2021-02-03 | End: 2021-03-30 | Stop reason: SDUPTHER

## 2021-03-22 ENCOUNTER — CLINICAL SUPPORT (OUTPATIENT)
Dept: FAMILY MEDICINE CLINIC | Facility: CLINIC | Age: 51
End: 2021-03-22

## 2021-03-22 DIAGNOSIS — E78.2 MIXED HYPERLIPIDEMIA: ICD-10-CM

## 2021-03-22 DIAGNOSIS — I10 ESSENTIAL HYPERTENSION: ICD-10-CM

## 2021-03-22 DIAGNOSIS — E11.9 TYPE 2 DIABETES MELLITUS WITHOUT COMPLICATION, WITHOUT LONG-TERM CURRENT USE OF INSULIN (HCC): Primary | ICD-10-CM

## 2021-03-22 DIAGNOSIS — E55.9 VITAMIN D DEFICIENCY: ICD-10-CM

## 2021-03-23 LAB
25(OH)D3+25(OH)D2 SERPL-MCNC: 45.9 NG/ML (ref 30–100)
ALBUMIN SERPL-MCNC: 4.4 G/DL (ref 4–5)
ALBUMIN/GLOB SERPL: 1.7 {RATIO} (ref 1.2–2.2)
ALP SERPL-CCNC: 50 IU/L (ref 39–117)
ALT SERPL-CCNC: 105 IU/L (ref 0–44)
AST SERPL-CCNC: 53 IU/L (ref 0–40)
BASOPHILS # BLD AUTO: 0.1 X10E3/UL (ref 0–0.2)
BASOPHILS NFR BLD AUTO: 1 %
BILIRUB SERPL-MCNC: 0.3 MG/DL (ref 0–1.2)
BUN SERPL-MCNC: 18 MG/DL (ref 6–24)
BUN/CREAT SERPL: 18 (ref 9–20)
CALCIUM SERPL-MCNC: 9.7 MG/DL (ref 8.7–10.2)
CHLORIDE SERPL-SCNC: 100 MMOL/L (ref 96–106)
CHOLEST SERPL-MCNC: 137 MG/DL (ref 100–199)
CHOLEST/HDLC SERPL: 5.7 RATIO (ref 0–5)
CO2 SERPL-SCNC: 25 MMOL/L (ref 20–29)
CREAT SERPL-MCNC: 1.02 MG/DL (ref 0.76–1.27)
EOSINOPHIL # BLD AUTO: 0.2 X10E3/UL (ref 0–0.4)
EOSINOPHIL NFR BLD AUTO: 2 %
ERYTHROCYTE [DISTWIDTH] IN BLOOD BY AUTOMATED COUNT: 13 % (ref 11.6–15.4)
EST. AVERAGE GLUCOSE BLD GHB EST-MCNC: 163 MG/DL
GLOBULIN SER-MCNC: 2.6 G/DL (ref 1.5–4.5)
GLUCOSE SERPL-MCNC: 192 MG/DL (ref 65–99)
HBA1C MFR BLD: 7.3 % (ref 4.8–5.6)
HCT VFR BLD AUTO: 44.4 % (ref 37.5–51)
HDLC SERPL-MCNC: 24 MG/DL
HGB BLD-MCNC: 15.1 G/DL (ref 13–17.7)
IMM GRANULOCYTES # BLD: 0 X10E3/UL (ref 0–0.1)
IMM GRANULOCYTES NFR BLD: 0 %
LDLC SERPL CALC-MCNC: 83 MG/DL (ref 0–99)
LYMPHOCYTES # BLD AUTO: 3.2 X10E3/UL (ref 0.7–3.1)
LYMPHOCYTES NFR BLD AUTO: 38 %
MCH RBC QN AUTO: 29.8 PG (ref 26.6–33)
MCHC RBC AUTO-ENTMCNC: 34 G/DL (ref 31.5–35.7)
MCV RBC AUTO: 88 FL (ref 79–97)
MONOCYTES # BLD AUTO: 0.7 X10E3/UL (ref 0.1–0.9)
MONOCYTES NFR BLD AUTO: 8 %
NEUTROPHILS # BLD AUTO: 4.4 X10E3/UL (ref 1.4–7)
NEUTROPHILS NFR BLD AUTO: 51 %
PLATELET # BLD AUTO: 298 X10E3/UL (ref 150–450)
POTASSIUM SERPL-SCNC: 4.4 MMOL/L (ref 3.5–5.2)
PROT SERPL-MCNC: 7 G/DL (ref 6–8.5)
RBC # BLD AUTO: 5.06 X10E6/UL (ref 4.14–5.8)
SL AMB EGFR AFRICAN AMERICAN: 99 ML/MIN/1.73
SL AMB EGFR NON AFRICAN AMERICAN: 86 ML/MIN/1.73
SL AMB VLDL CHOLESTEROL CALC: 30 MG/DL (ref 5–40)
SODIUM SERPL-SCNC: 136 MMOL/L (ref 134–144)
TRIGL SERPL-MCNC: 170 MG/DL (ref 0–149)
WBC # BLD AUTO: 8.5 X10E3/UL (ref 3.4–10.8)

## 2021-03-23 PROCEDURE — 3051F HG A1C>EQUAL 7.0%<8.0%: CPT | Performed by: NURSE PRACTITIONER

## 2021-03-30 ENCOUNTER — OFFICE VISIT (OUTPATIENT)
Dept: FAMILY MEDICINE CLINIC | Facility: CLINIC | Age: 51
End: 2021-03-30
Payer: COMMERCIAL

## 2021-03-30 VITALS
DIASTOLIC BLOOD PRESSURE: 84 MMHG | OXYGEN SATURATION: 94 % | HEIGHT: 69 IN | WEIGHT: 272 LBS | HEART RATE: 104 BPM | SYSTOLIC BLOOD PRESSURE: 118 MMHG | TEMPERATURE: 98.5 F | BODY MASS INDEX: 40.29 KG/M2 | RESPIRATION RATE: 16 BRPM

## 2021-03-30 DIAGNOSIS — E11.9 TYPE 2 DIABETES MELLITUS WITHOUT COMPLICATION, WITHOUT LONG-TERM CURRENT USE OF INSULIN (HCC): Primary | ICD-10-CM

## 2021-03-30 DIAGNOSIS — E78.2 MIXED HYPERLIPIDEMIA: ICD-10-CM

## 2021-03-30 DIAGNOSIS — E66.01 MORBID OBESITY WITH BMI OF 40.0-44.9, ADULT (HCC): ICD-10-CM

## 2021-03-30 DIAGNOSIS — I10 ESSENTIAL HYPERTENSION: ICD-10-CM

## 2021-03-30 DIAGNOSIS — E78.1 HYPERTRIGLYCERIDEMIA: ICD-10-CM

## 2021-03-30 DIAGNOSIS — E55.9 VITAMIN D DEFICIENCY: ICD-10-CM

## 2021-03-30 PROCEDURE — 3008F BODY MASS INDEX DOCD: CPT | Performed by: NURSE PRACTITIONER

## 2021-03-30 PROCEDURE — 4010F ACE/ARB THERAPY RXD/TAKEN: CPT | Performed by: NURSE PRACTITIONER

## 2021-03-30 PROCEDURE — 99214 OFFICE O/P EST MOD 30 MIN: CPT | Performed by: NURSE PRACTITIONER

## 2021-03-30 PROCEDURE — 3725F SCREEN DEPRESSION PERFORMED: CPT | Performed by: NURSE PRACTITIONER

## 2021-03-30 PROCEDURE — 1036F TOBACCO NON-USER: CPT | Performed by: NURSE PRACTITIONER

## 2021-03-30 PROCEDURE — 3079F DIAST BP 80-89 MM HG: CPT | Performed by: NURSE PRACTITIONER

## 2021-03-30 PROCEDURE — 3074F SYST BP LT 130 MM HG: CPT | Performed by: NURSE PRACTITIONER

## 2021-03-30 RX ORDER — FENOFIBRATE 54 MG/1
54 TABLET ORAL DAILY
Qty: 90 TABLET | Refills: 1 | Status: SHIPPED | OUTPATIENT
Start: 2021-03-30 | End: 2022-01-15 | Stop reason: SDUPTHER

## 2021-03-30 RX ORDER — ATORVASTATIN CALCIUM 20 MG/1
20 TABLET, FILM COATED ORAL DAILY
Qty: 90 TABLET | Refills: 1 | Status: SHIPPED | OUTPATIENT
Start: 2021-03-30 | End: 2022-01-15 | Stop reason: SDUPTHER

## 2021-03-30 RX ORDER — LISINOPRIL 10 MG/1
10 TABLET ORAL DAILY
Qty: 90 TABLET | Refills: 1 | Status: SHIPPED | OUTPATIENT
Start: 2021-03-30 | End: 2022-01-15 | Stop reason: SDUPTHER

## 2021-03-30 RX ORDER — ERGOCALCIFEROL 1.25 MG/1
50000 CAPSULE ORAL WEEKLY
Qty: 12 CAPSULE | Refills: 1 | Status: SHIPPED | OUTPATIENT
Start: 2021-03-30 | End: 2022-01-15 | Stop reason: SDUPTHER

## 2021-03-30 NOTE — PATIENT INSTRUCTIONS
Heart healthy, carbohydrate controlled diet- limit red meat, limit saturated fat, moderate salt intake, limit junk food, etc    Regular exercise  Stress management  Routine labwork and screenings as ordered     Continue all same medications

## 2021-03-30 NOTE — PROGRESS NOTES
Assessment/Plan:  1  Type 2 diabetes mellitus without complication, without long-term current use of insulin ([facility])  A1C went up  Mostly likely secondary to dietary indiscretion  Carbohydrate controlled diet  Continue diabetic medications as ordered  Increase regular exercise: Consider such things as walking, biking, strength training , etc    - metFORMIN (GLUCOPHAGE) 500 mg tablet; Take 2 tablets (1,000 mg total) by mouth 2 (two) times a day with meals  Dispense: 360 tablet; Refill: 1  2  Essential hypertension  Stable  Continue blood pressure medications as ordered  Monitor blood pressure  Limit salt in diet  - lisinopril (ZESTRIL) 10 mg tablet; Take 1 tablet (10 mg total) by mouth daily  Dispense: 90 tablet; Refill: 1  3  Mixed hyperlipidemia  Heart healthy diet  Continue statin  Continue fibrate  Regular exercise  Weight loss  - fenofibrate (TRICOR) 54 MG tablet; Take 1 tablet (54 mg total) by mouth daily  Dispense: 90 tablet; Refill: 1  - atorvastatin (LIPITOR) 20 mg tablet; Take 1 tablet (20 mg total) by mouth daily  Dispense: 90 tablet; Refill: 1  4  Morbid obesity with BMI of 40 0-44 9, adult ([facility] [address] )  Weight loss is recommended to improve overall health  Dietary changes- limit carbohydrates, decrease overall caloric intake, reduce portion sizes, healthier snack choices, limit saturated fats, increase intake of fruits and vegetables, limit junk food  Increase exercise to 3-5 times per week  Consider adding strength exercises to exercise routine  5  Vitamin D deficiency  - ergocalciferol (VITAMIN D2) 50,000 units; Take 1 capsule (50,000 Units total) by mouth once a week  Dispense: 12 capsule; Refill: 1  6  Hypertriglyceridemia  - fenofibrate (TRICOR) 54 MG tablet; Take 1 tablet (54 mg total) by mouth daily  Dispense: 90 tablet;  Refill: 1    Patient was counseled regarding instructions for management which included: impression/diagnosis, risk/benefits of treatment options, importance of compliance with treatment, risk factor reduction, and prognosis  I have reviewed the instructions with the patient answering all questions and patient verbalized understanding  BMI Counseling: Body mass index is 40 17 kg/m²  The BMI is above normal  Nutrition recommendations include reducing portion sizes, decreasing overall calorie intake, moderation in carbohydrate intake, reducing intake of saturated fat and trans fat and reducing intake of cholesterol  Exercise recommendations include exercising 3-5 times per week  Depression Screening Follow-up Plan: Patient's depression screening was negative with a PHQ-2 score of 0    Clinically patient does not have depression  No treatment is required  Subjective:      Patient ID: David Shah is a 53 y o  male who presents for follow up    Here for follow up on chronic conditions  On meds for DM, lipids, htn, and vitamin D def  Admits does not watch diet  Does eat a lot of carbs  Does not exercise regularly  Does not check blood sugar  Feels well  No recent illness  No specific concerns or issues  The following portions of the patient's history were reviewed and updated as appropriate: allergies, current medications, past family history, past medical history, past social history, past surgical history and problem list     Review of Systems   Constitutional: Negative for activity change, appetite change and unexpected weight change  Respiratory: Negative for chest tightness and shortness of breath  Cardiovascular: Negative for chest pain, palpitations and leg swelling  Gastrointestinal: Negative for abdominal pain  Endocrine: Negative for cold intolerance, heat intolerance, polydipsia, polyphagia and polyuria  Skin: Negative for color change and pallor  Allergic/Immunologic: Negative for immunocompromised state  Neurological: Negative for dizziness and headaches     Psychiatric/Behavioral: Negative for self-injury and suicidal ideas  The patient is not nervous/anxious            Objective:  Recent Results (from the past 672 hour(s))   CBC and differential    Collection Time: 03/22/21  1:57 PM   Result Value Ref Range    White Blood Cell Count 8 5 3 4 - 10 8 x10E3/uL    Red Blood Cell Count 5 06 4 14 - 5 80 x10E6/uL    Hemoglobin 15 1 13 0 - 17 7 g/dL    HCT 44 4 37 5 - 51 0 %    MCV 88 79 - 97 fL    MCH 29 8 26 6 - 33 0 pg    MCHC 34 0 31 5 - 35 7 g/dL    RDW 13 0 11 6 - 15 4 %    Platelet Count 686 806 - 450 x10E3/uL    Neutrophils 51 Not Estab  %    Lymphocytes 38 Not Estab  %    Monocytes 8 Not Estab  %    Eosinophils 2 Not Estab  %    Basophils PCT 1 Not Estab  %    Neutrophils (Absolute) 4 4 1 4 - 7 0 x10E3/uL    Lymphocytes (Absolute) 3 2 (H) 0 7 - 3 1 x10E3/uL    Monocytes (Absolute) 0 7 0 1 - 0 9 x10E3/uL    Eosinophils (Absolute) 0 2 0 0 - 0 4 x10E3/uL    Basophils ABS 0 1 0 0 - 0 2 x10E3/uL    Immature Granulocytes 0 Not Estab  %    Immature Granulocytes (Absolute) 0 0 0 0 - 0 1 x10E3/uL   Comprehensive metabolic panel    Collection Time: 03/22/21  1:57 PM   Result Value Ref Range    Glucose, Random 192 (H) 65 - 99 mg/dL    BUN 18 6 - 24 mg/dL    Creatinine 1 02 0 76 - 1 27 mg/dL    eGFR Non  86 >59 mL/min/1 73    eGFR  99 >59 mL/min/1 73    SL AMB BUN/CREATININE RATIO 18 9 - 20    Sodium 136 134 - 144 mmol/L    Potassium 4 4 3 5 - 5 2 mmol/L    Chloride 100 96 - 106 mmol/L    CO2 25 20 - 29 mmol/L    CALCIUM 9 7 8 7 - 10 2 mg/dL    Protein, Total 7 0 6 0 - 8 5 g/dL    Albumin 4 4 4 0 - 5 0 g/dL    Globulin, Total 2 6 1 5 - 4 5 g/dL    Albumin/Globulin Ratio 1 7 1 2 - 2 2    TOTAL BILIRUBIN 0 3 0 0 - 1 2 mg/dL    Alk Phos Isoenzymes 50 39 - 117 IU/L    AST 53 (H) 0 - 40 IU/L     (H) 0 - 44 IU/L   Hemoglobin A1C    Collection Time: 03/22/21  1:57 PM   Result Value Ref Range    Hemoglobin A1C 7 3 (H) 4 8 - 5 6 %    Estimated Average Glucose 163 mg/dL   Lipid panel    Collection Time: 03/22/21 1:57 PM   Result Value Ref Range    Cholesterol, Total 137 100 - 199 mg/dL    Triglycerides 170 (H) 0 - 149 mg/dL    HDL 24 (L) >39 mg/dL    VLDL Cholesterol Calculated 30 5 - 40 mg/dL    LDL Calculated 83 0 - 99 mg/dL    T  Chol/HDL Ratio 5 7 (H) 0 0 - 5 0 ratio   Vitamin D 25 hydroxy    Collection Time: 03/22/21  1:57 PM   Result Value Ref Range    25-HYDROXY VIT D 45 9 30 0 - 100 0 ng/mL     9/29  poct [id number] 6 8  6/23/2020  Cholesterol, Total 100 - 199 mg/dL 155    Triglycerides 0 - 149 mg/dL 253High     HDL >39 mg/dL 28Low     VLDL Cholesterol Calculated 5 - 40 mg/dL 51High     LDL Calculated 0 - 99 mg/dL 76      25-HYDROXY VIT D 30 0 - 100 0 ng/mL 29 9Low       Reviewed lab/diagnostic results with pt including both normal and abnormal findings  In depth counseling and instructions given  All questions answered during visit  /84 (BP Location: Right arm, Patient Position: Sitting, Cuff Size: Large)   Pulse 104   Temp 98 5 °F (36 9 °C) (Temporal)   Resp 16   Ht 5' 9" (1 753 m)   Wt 123 kg (272 lb)   SpO2 94%   BMI 40 17 kg/m²          Physical Exam  Vitals signs reviewed  Constitutional:       General: He is not in acute distress  Appearance: He is obese  He is not ill-appearing  Neck:      Musculoskeletal: Normal range of motion and neck supple  Vascular: No carotid bruit  Comments: No JVD  Cardiovascular:      Pulses: no weak pulses          Dorsalis pedis pulses are 2+ on the right side and 2+ on the left side  Posterior tibial pulses are 2+ on the right side and 2+ on the left side  Pulmonary:      Effort: Pulmonary effort is normal  No respiratory distress  Breath sounds: Normal breath sounds  No wheezing  Musculoskeletal:      Right lower leg: No edema  Left lower leg: No edema  Feet:      Left foot:      Skin integrity: No ulcer, skin breakdown, erythema, warmth, callus or dry skin  Skin:     General: Skin is warm and dry        Coloration: Skin is not jaundiced or pale  Neurological:      General: No focal deficit present  Mental Status: He is alert and oriented to person, place, and time  Cranial Nerves: No cranial nerve deficit  Sensory: No sensory deficit  Gait: Gait normal    Psychiatric:         Mood and Affect: Mood normal          Behavior: Behavior normal          Thought Content: Thought content normal          Judgment: Judgment normal        Patient's shoes and socks removed  Right Foot/Ankle   Right Foot Inspection    Toe Exam: ROM and strength within normal limits  Sensory   Vibration: intact  Proprioception: intact   Monofilament testing: intact  Vascular  Capillary refills: < 3 seconds  The right DP pulse is 2+  The right PT pulse is 2+  Left Foot/Ankle  Left Foot Inspection  Skin Exam: skin normal and skin intactno dry skin, no warmth, no erythema, no maceration, normal color, no pre-ulcer, no ulcer and no callus                         Toe Exam: ROM and strength within normal limits                   Sensory   Vibration: intact  Proprioception: intact  Monofilament: intact  Vascular  Capillary refills: < 3 seconds  The left DP pulse is 2+  The left PT pulse is 2+  Assign Risk Category:  No deformity present; No loss of protective sensation;  No weak pulses       Risk: 0

## 2021-04-06 LAB
LEFT EYE DIABETIC RETINOPATHY: NORMAL
RIGHT EYE DIABETIC RETINOPATHY: NORMAL

## 2021-04-28 ENCOUNTER — TELEPHONE (OUTPATIENT)
Dept: FAMILY MEDICINE CLINIC | Facility: CLINIC | Age: 51
End: 2021-04-28

## 2021-05-24 ENCOUNTER — OFFICE VISIT (OUTPATIENT)
Dept: FAMILY MEDICINE CLINIC | Facility: CLINIC | Age: 51
End: 2021-05-24
Payer: COMMERCIAL

## 2021-05-24 VITALS
OXYGEN SATURATION: 98 % | HEART RATE: 95 BPM | WEIGHT: 265 LBS | TEMPERATURE: 97.3 F | DIASTOLIC BLOOD PRESSURE: 88 MMHG | BODY MASS INDEX: 37.1 KG/M2 | SYSTOLIC BLOOD PRESSURE: 124 MMHG | HEIGHT: 71 IN | RESPIRATION RATE: 18 BRPM

## 2021-05-24 DIAGNOSIS — J06.9 VIRAL URI WITH COUGH: Primary | ICD-10-CM

## 2021-05-24 DIAGNOSIS — R06.2 WHEEZE: ICD-10-CM

## 2021-05-24 PROCEDURE — 1036F TOBACCO NON-USER: CPT | Performed by: NURSE PRACTITIONER

## 2021-05-24 PROCEDURE — 3079F DIAST BP 80-89 MM HG: CPT | Performed by: NURSE PRACTITIONER

## 2021-05-24 PROCEDURE — 3008F BODY MASS INDEX DOCD: CPT | Performed by: NURSE PRACTITIONER

## 2021-05-24 PROCEDURE — 99214 OFFICE O/P EST MOD 30 MIN: CPT | Performed by: NURSE PRACTITIONER

## 2021-05-24 PROCEDURE — 3074F SYST BP LT 130 MM HG: CPT | Performed by: NURSE PRACTITIONER

## 2021-05-24 RX ORDER — ALBUTEROL SULFATE 90 UG/1
2 AEROSOL, METERED RESPIRATORY (INHALATION) EVERY 6 HOURS PRN
Qty: 18 G | Refills: 0 | Status: SHIPPED | OUTPATIENT
Start: 2021-05-24

## 2021-05-24 RX ORDER — PREDNISONE 20 MG/1
20 TABLET ORAL DAILY
Qty: 5 TABLET | Refills: 0 | Status: SHIPPED | OUTPATIENT
Start: 2021-05-24 | End: 2021-05-29

## 2021-05-24 NOTE — LETTER
May 24, 2021     Patient: Luis Daniel Goss   YOB: 1970   Date of Visit: 5/24/2021       To Whom it May Concern:    Lora Eduardo is under my professional care  He was seen in my office on 5/24/2021  He may return to work on 5/25/2021  He was unable to work 5/20/2021 through 5/24/2021 due to illness  He was tested for Covid which was negative  Currently he continues with mild cold symptoms and cough but is not contagious at this time  If you have any questions or concerns, please don't hesitate to call           Sincerely,          PHYLLIS Godoy

## 2021-05-24 NOTE — PATIENT INSTRUCTIONS
Fluids  Rest  Nasal saline  Supportive care for symptom management  Tylenol or ibuprofen as needed for fever or discomfort  Use a cool mist humidifier at bedtime  Antibiotics are not indicated at this time  Symptoms may persist for 7-14 days  Rescue inhaler 2 puffs every 4-6 hours as needed for shortness breath, chest tightness, bronchospasm, coughing fits  Prednisone 20mg daily with food for 5 days

## 2021-05-24 NOTE — PROGRESS NOTES
Assessment/Plan:  1  Viral URI with cough  Fluids  Rest  Nasal saline  Supportive care for symptom management  Tylenol or ibuprofen as needed for fever or discomfort  Use a cool mist humidifier at bedtime  Antibiotics are not indicated at this time  Symptoms may persist for 7-14 days  Rescue inhaler 2 puffs every 4-6 hours as needed for shortness breath, chest tightness, bronchospasm, coughing fits  Prednisone 20mg daily with food for 5 days  - predniSONE 20 mg tablet; Take 1 tablet (20 mg total) by mouth daily for 5 days  Dispense: 5 tablet; Refill: 0  - albuterol (Ventolin HFA) 90 mcg/act inhaler; Inhale 2 puffs every 6 (six) hours as needed for wheezing  Dispense: 18 g; Refill: 0  2  Wheeze  - predniSONE 20 mg tablet; Take 1 tablet (20 mg total) by mouth daily for 5 days  Dispense: 5 tablet; Refill: 0  - albuterol (Ventolin HFA) 90 mcg/act inhaler; Inhale 2 puffs every 6 (six) hours as needed for wheezing  Dispense: 18 g; Refill: 0    Patient was counseled regarding instructions for management which included: impression/diagnosis, risk/benefits of treatment options, importance of compliance with treatment, risk factor reduction, and prognosis  I have reviewed the instructions with the patient answering all questions and patient verbalized understanding  Subjective:      Patient ID: Anthony Quinonez is a 53 y o  male who presents for cold symptoms  Here for cold symptoms  One week  Clear nasal discharge  Cough  Was tight and now looser but has been wheezing  No history of asthma  Non smoker  No fever  Took theraflu  Today feels slightly better  Got covid test last week and was negative  Has seasonal allergies, uses flonase and nasal irrigation           The following portions of the patient's history were reviewed and updated as appropriate: allergies, current medications, past family history, past medical history, past social history, past surgical history and problem list     Review of Systems   Constitutional: Negative for fatigue and fever  HENT: Positive for congestion, postnasal drip and rhinorrhea  Negative for sinus pressure, sinus pain and sore throat  Respiratory: Positive for cough and wheezing  Negative for chest tightness and shortness of breath  Cardiovascular: Negative for chest pain and palpitations  Gastrointestinal: Negative for diarrhea, nausea and vomiting  Musculoskeletal: Negative for myalgias  Neurological: Negative for dizziness and headaches  Hematological: Negative for adenopathy  Objective:      /88   Pulse 95   Temp (!) 97 3 °F (36 3 °C) (Temporal)   Resp 18   Ht 5' 11" (1 803 m)   Wt 120 kg (265 lb)   SpO2 98%   BMI 36 96 kg/m²          Physical Exam  Constitutional:       General: He is not in acute distress  Appearance: He is not ill-appearing  Neck:      Musculoskeletal: Normal range of motion  Cardiovascular:      Rate and Rhythm: Normal rate and regular rhythm  Pulmonary:      Effort: Pulmonary effort is normal  No respiratory distress  Breath sounds: Wheezing present  No rhonchi or rales  Musculoskeletal:      Right lower leg: No edema  Left lower leg: No edema  Lymphadenopathy:      Cervical: No cervical adenopathy  Skin:     General: Skin is warm and dry  Coloration: Skin is not jaundiced or pale  Neurological:      General: No focal deficit present  Mental Status: He is alert and oriented to person, place, and time  Cranial Nerves: No cranial nerve deficit  Sensory: No sensory deficit  Psychiatric:         Mood and Affect: Mood normal          Behavior: Behavior normal          Thought Content:  Thought content normal          Judgment: Judgment normal

## 2021-05-25 ENCOUNTER — TELEPHONE (OUTPATIENT)
Dept: ADMINISTRATIVE | Facility: OTHER | Age: 51
End: 2021-05-25

## 2021-05-25 NOTE — TELEPHONE ENCOUNTER
Upon review of the In Basket request and the patient's chart, initial outreach has been made via telephone call, please see Contacts section for details        Spoke to Gordon Him, she will send exam notes today     Thank you  Shirley Herrera

## 2021-05-26 NOTE — TELEPHONE ENCOUNTER
Upon review of the In Basket request we were able to locate, review, and update the patient chart as requested for Diabetic Eye Exam     Any additional questions or concerns should be emailed to the Practice Liaisons via [email or web address] email, please do not reply via In Basket      Thank you  Kathy Mora MA

## 2021-06-01 DIAGNOSIS — J06.9 VIRAL URI WITH COUGH: ICD-10-CM

## 2021-06-01 DIAGNOSIS — R06.2 WHEEZE: ICD-10-CM

## 2021-06-01 NOTE — TELEPHONE ENCOUNTER
Received refill request for Prednisone  Not usually refilled  Please call pt to see if he is feeling better and/or if he requested in and why   TY

## 2021-06-03 RX ORDER — PREDNISONE 20 MG/1
TABLET ORAL
Qty: 5 TABLET | Refills: 0 | OUTPATIENT
Start: 2021-06-03

## 2022-01-15 DIAGNOSIS — I10 ESSENTIAL HYPERTENSION: ICD-10-CM

## 2022-01-15 DIAGNOSIS — E11.9 TYPE 2 DIABETES MELLITUS WITHOUT COMPLICATION, WITHOUT LONG-TERM CURRENT USE OF INSULIN (HCC): ICD-10-CM

## 2022-01-15 DIAGNOSIS — E78.2 MIXED HYPERLIPIDEMIA: ICD-10-CM

## 2022-01-15 DIAGNOSIS — E78.1 HYPERTRIGLYCERIDEMIA: ICD-10-CM

## 2022-01-15 DIAGNOSIS — E55.9 VITAMIN D DEFICIENCY: ICD-10-CM

## 2022-01-17 NOTE — TELEPHONE ENCOUNTER
Requested medication(s) are due for refill today: Yes  Patient has already received a courtesy refill: no  Other reason request has been forwarded to provider:   Cardiovascular:  ACE Inhibitors Failed 01/15/2022 09:34 AM   Protocol Details  BP completed in the last 6 months    Valid encounter within last 6 months

## 2022-01-17 NOTE — TELEPHONE ENCOUNTER
Pt has been no show for last 3 appts  Overdue for labs and appt  Last DM check was almost a year ago     No med refills until gets labs done and has appt

## 2022-01-20 NOTE — TELEPHONE ENCOUNTER
Please call pt and advise that I will send 30 days of meds  No refills  Needs labs     Needs appt normal...

## 2022-01-21 ENCOUNTER — CLINICAL SUPPORT (OUTPATIENT)
Dept: FAMILY MEDICINE CLINIC | Facility: CLINIC | Age: 52
End: 2022-01-21

## 2022-01-21 ENCOUNTER — TELEPHONE (OUTPATIENT)
Dept: FAMILY MEDICINE CLINIC | Facility: CLINIC | Age: 52
End: 2022-01-21

## 2022-01-21 DIAGNOSIS — E11.9 TYPE 2 DIABETES MELLITUS WITHOUT COMPLICATION, WITHOUT LONG-TERM CURRENT USE OF INSULIN (HCC): ICD-10-CM

## 2022-01-21 DIAGNOSIS — I10 ESSENTIAL HYPERTENSION: ICD-10-CM

## 2022-01-21 DIAGNOSIS — E78.2 MIXED HYPERLIPIDEMIA: ICD-10-CM

## 2022-01-21 DIAGNOSIS — E55.9 VITAMIN D DEFICIENCY: ICD-10-CM

## 2022-01-21 RX ORDER — LISINOPRIL 10 MG/1
10 TABLET ORAL DAILY
Qty: 30 TABLET | Refills: 0 | Status: SHIPPED | OUTPATIENT
Start: 2022-01-21 | End: 2022-02-01 | Stop reason: SDUPTHER

## 2022-01-21 RX ORDER — ATORVASTATIN CALCIUM 20 MG/1
20 TABLET, FILM COATED ORAL DAILY
Qty: 30 TABLET | Refills: 0 | Status: SHIPPED | OUTPATIENT
Start: 2022-01-21 | End: 2022-02-01 | Stop reason: SDUPTHER

## 2022-01-21 RX ORDER — ERGOCALCIFEROL 1.25 MG/1
50000 CAPSULE ORAL WEEKLY
Qty: 4 CAPSULE | Refills: 0 | Status: SHIPPED | OUTPATIENT
Start: 2022-01-21 | End: 2022-02-01 | Stop reason: SDUPTHER

## 2022-01-21 RX ORDER — FENOFIBRATE 54 MG/1
54 TABLET ORAL DAILY
Qty: 30 TABLET | Refills: 0 | Status: SHIPPED | OUTPATIENT
Start: 2022-01-21 | End: 2022-02-01 | Stop reason: SDUPTHER

## 2022-01-21 NOTE — TELEPHONE ENCOUNTER
Patient is scheduled to come in today for a nurse visit for lab draw  He will schedule a med check/follow up appointment while he is here for blood work     Thank You

## 2022-01-21 NOTE — TELEPHONE ENCOUNTER
Lmom for patient to upload his negative covid result in his mychart before his nurse visit today for blood draw in our office

## 2022-01-22 LAB
25(OH)D3+25(OH)D2 SERPL-MCNC: 21.8 NG/ML (ref 30–100)
ALBUMIN SERPL-MCNC: 4.3 G/DL (ref 4–5)
ALBUMIN/GLOB SERPL: 1.5 {RATIO} (ref 1.2–2.2)
ALP SERPL-CCNC: 58 IU/L (ref 44–121)
ALT SERPL-CCNC: 91 IU/L (ref 0–44)
AST SERPL-CCNC: 44 IU/L (ref 0–40)
BASOPHILS # BLD AUTO: 0 X10E3/UL (ref 0–0.2)
BASOPHILS NFR BLD AUTO: 0 %
BILIRUB SERPL-MCNC: 0.6 MG/DL (ref 0–1.2)
BUN SERPL-MCNC: 21 MG/DL (ref 6–24)
BUN/CREAT SERPL: 22 (ref 9–20)
CALCIUM SERPL-MCNC: 9.8 MG/DL (ref 8.7–10.2)
CHLORIDE SERPL-SCNC: 101 MMOL/L (ref 96–106)
CHOLEST SERPL-MCNC: 205 MG/DL (ref 100–199)
CHOLEST/HDLC SERPL: 9.3 RATIO (ref 0–5)
CO2 SERPL-SCNC: 23 MMOL/L (ref 20–29)
CREAT SERPL-MCNC: 0.97 MG/DL (ref 0.76–1.27)
EOSINOPHIL # BLD AUTO: 0.4 X10E3/UL (ref 0–0.4)
EOSINOPHIL NFR BLD AUTO: 5 %
ERYTHROCYTE [DISTWIDTH] IN BLOOD BY AUTOMATED COUNT: 12.5 % (ref 11.6–15.4)
EST. AVERAGE GLUCOSE BLD GHB EST-MCNC: 160 MG/DL
GLOBULIN SER-MCNC: 2.9 G/DL (ref 1.5–4.5)
GLUCOSE SERPL-MCNC: 151 MG/DL (ref 65–99)
HBA1C MFR BLD: 7.2 % (ref 4.8–5.6)
HCT VFR BLD AUTO: 42.3 % (ref 37.5–51)
HDLC SERPL-MCNC: 22 MG/DL
HGB BLD-MCNC: 15 G/DL (ref 13–17.7)
IMM GRANULOCYTES # BLD: 0.1 X10E3/UL (ref 0–0.1)
IMM GRANULOCYTES NFR BLD: 2 %
LDLC SERPL CALC-MCNC: 143 MG/DL (ref 0–99)
LYMPHOCYTES # BLD AUTO: 1.8 X10E3/UL (ref 0.7–3.1)
LYMPHOCYTES NFR BLD AUTO: 26 %
MCH RBC QN AUTO: 30 PG (ref 26.6–33)
MCHC RBC AUTO-ENTMCNC: 35.5 G/DL (ref 31.5–35.7)
MCV RBC AUTO: 85 FL (ref 79–97)
MONOCYTES # BLD AUTO: 0.7 X10E3/UL (ref 0.1–0.9)
MONOCYTES NFR BLD AUTO: 10 %
NEUTROPHILS # BLD AUTO: 4 X10E3/UL (ref 1.4–7)
NEUTROPHILS NFR BLD AUTO: 57 %
PLATELET # BLD AUTO: 213 X10E3/UL (ref 150–450)
POTASSIUM SERPL-SCNC: 4.1 MMOL/L (ref 3.5–5.2)
PROT SERPL-MCNC: 7.2 G/DL (ref 6–8.5)
RBC # BLD AUTO: 5 X10E6/UL (ref 4.14–5.8)
SL AMB EGFR AFRICAN AMERICAN: 105 ML/MIN/1.73
SL AMB EGFR NON AFRICAN AMERICAN: 91 ML/MIN/1.73
SL AMB VLDL CHOLESTEROL CALC: 40 MG/DL (ref 5–40)
SODIUM SERPL-SCNC: 139 MMOL/L (ref 134–144)
TRIGL SERPL-MCNC: 216 MG/DL (ref 0–149)
WBC # BLD AUTO: 7 X10E3/UL (ref 3.4–10.8)

## 2022-02-01 ENCOUNTER — OFFICE VISIT (OUTPATIENT)
Dept: FAMILY MEDICINE CLINIC | Facility: CLINIC | Age: 52
End: 2022-02-01
Payer: COMMERCIAL

## 2022-02-01 VITALS
WEIGHT: 264 LBS | SYSTOLIC BLOOD PRESSURE: 120 MMHG | BODY MASS INDEX: 36.96 KG/M2 | DIASTOLIC BLOOD PRESSURE: 70 MMHG | HEIGHT: 71 IN | HEART RATE: 102 BPM | TEMPERATURE: 97.6 F | OXYGEN SATURATION: 98 %

## 2022-02-01 DIAGNOSIS — E78.1 HYPERTRIGLYCERIDEMIA: ICD-10-CM

## 2022-02-01 DIAGNOSIS — E78.2 MIXED HYPERLIPIDEMIA: ICD-10-CM

## 2022-02-01 DIAGNOSIS — I10 ESSENTIAL HYPERTENSION: ICD-10-CM

## 2022-02-01 DIAGNOSIS — R53.83 FATIGUE, UNSPECIFIED TYPE: ICD-10-CM

## 2022-02-01 DIAGNOSIS — E55.9 VITAMIN D DEFICIENCY: ICD-10-CM

## 2022-02-01 DIAGNOSIS — E11.9 TYPE 2 DIABETES MELLITUS WITHOUT COMPLICATION, WITHOUT LONG-TERM CURRENT USE OF INSULIN (HCC): Primary | ICD-10-CM

## 2022-02-01 DIAGNOSIS — Z12.11 SCREENING FOR MALIGNANT NEOPLASM OF COLON: ICD-10-CM

## 2022-02-01 PROBLEM — E66.01 MORBID OBESITY WITH BMI OF 40.0-44.9, ADULT (HCC): Status: RESOLVED | Noted: 2019-03-05 | Resolved: 2022-02-01

## 2022-02-01 PROCEDURE — 1036F TOBACCO NON-USER: CPT | Performed by: NURSE PRACTITIONER

## 2022-02-01 PROCEDURE — 36415 COLL VENOUS BLD VENIPUNCTURE: CPT | Performed by: NURSE PRACTITIONER

## 2022-02-01 PROCEDURE — 4010F ACE/ARB THERAPY RXD/TAKEN: CPT | Performed by: NURSE PRACTITIONER

## 2022-02-01 PROCEDURE — 99214 OFFICE O/P EST MOD 30 MIN: CPT | Performed by: NURSE PRACTITIONER

## 2022-02-01 PROCEDURE — 3074F SYST BP LT 130 MM HG: CPT | Performed by: NURSE PRACTITIONER

## 2022-02-01 PROCEDURE — 3078F DIAST BP <80 MM HG: CPT | Performed by: NURSE PRACTITIONER

## 2022-02-01 PROCEDURE — 3008F BODY MASS INDEX DOCD: CPT | Performed by: NURSE PRACTITIONER

## 2022-02-01 RX ORDER — FENOFIBRATE 54 MG/1
54 TABLET ORAL DAILY
Qty: 90 TABLET | Refills: 1 | Status: SHIPPED | OUTPATIENT
Start: 2022-02-01

## 2022-02-01 RX ORDER — ERGOCALCIFEROL 1.25 MG/1
50000 CAPSULE ORAL WEEKLY
Qty: 12 CAPSULE | Refills: 1 | Status: SHIPPED | OUTPATIENT
Start: 2022-02-01

## 2022-02-01 RX ORDER — ATORVASTATIN CALCIUM 20 MG/1
20 TABLET, FILM COATED ORAL DAILY
Qty: 90 TABLET | Refills: 1 | Status: SHIPPED | OUTPATIENT
Start: 2022-02-01

## 2022-02-01 RX ORDER — LISINOPRIL 10 MG/1
10 TABLET ORAL DAILY
Qty: 90 TABLET | Refills: 1 | Status: SHIPPED | OUTPATIENT
Start: 2022-02-01 | End: 2022-05-16

## 2022-02-01 NOTE — PROGRESS NOTES
Assessment/Plan:    1  Type 2 diabetes mellitus without complication, without long-term current use of insulin (HCC)  Stable  Monitor blood sugar and bring diary of readings to next appointment  Carbohydrate controlled, heart healthy diet  Continue diabetic medications as ordered  Increase regular exercise: Consider such things as walking, biking, strength training , etc    - metFORMIN (GLUCOPHAGE) 500 mg tablet; Take 2 tablets (1,000 mg total) by mouth 2 (two) times a day with meals  Dispense: 360 tablet; Refill: 1  2  Essential hypertension  Stable  Continue blood pressure medications as ordered  Limit salt in diet  - lisinopril (ZESTRIL) 10 mg tablet; Take 1 tablet (10 mg total) by mouth daily  Dispense: 90 tablet; Refill: 1  3  Mixed hyperlipidemia  Heart healthy diet  Statin  Fibrate  Weight loss  Regular exercise  monitor  - atorvastatin (LIPITOR) 20 mg tablet; Take 1 tablet (20 mg total) by mouth daily  Dispense: 90 tablet; Refill: 1  - fenofibrate (TRICOR) 54 MG tablet; Take 1 tablet (54 mg total) by mouth daily  Dispense: 90 tablet; Refill: 1  4  Vitamin D deficiency  - ergocalciferol (VITAMIN D2) 50,000 units; Take 1 capsule (50,000 Units total) by mouth once a week  Dispense: 12 capsule; Refill: 1  5  Hypertriglyceridemia  Heart healthy diet  Statin  Fibrate  Weight loss  Regular exercise  monitor  - fenofibrate (TRICOR) 54 MG tablet; Take 1 tablet (54 mg total) by mouth daily  Dispense: 90 tablet; Refill: 1  6  Fatigue, unspecified type  - TSH, 3rd generation  - Testosterone  - Lyme Antibody Profile with reflex to WB  -7  Screening for malignant neoplasm of colon  - Cologuard    Patient was counseled regarding instructions for management which included: impression/diagnosis, risk/benefits of treatment options, importance of compliance with treatment, risk factor reduction, and prognosis     I have reviewed the instructions with the patient answering all questions and patient verbalized understanding  BMI Counseling: Body mass index is 36 82 kg/m²  The BMI is above normal  Nutrition recommendations include reducing portion sizes, decreasing overall calorie intake, moderation in carbohydrate intake, reducing intake of saturated fat and trans fat and reducing intake of cholesterol  Exercise recommendations include exercising 3-5 times per week  Depression Screening Follow-up Plan: Patient's depression screening was positive with a PHQ-2 score of 1    Patient assessed for underlying major depression  They have no active suicidal ideations  Brief counseling provided and recommend additional follow-up/re-evaluation next office visit  Subjective:      Patient ID: Alex Aaron is a 49 y o  male who presents for follow up    Here for follow up  Was out of meds for about 2 months  Taking all meds again as prescribed  Very fatigued all the time, months  Admits does not watch diet and eats a lot of high carb foods        The following portions of the patient's history were reviewed and updated as appropriate: allergies, current medications, past family history, past medical history, past social history, past surgical history and problem list     Review of Systems   Constitutional: Positive for fatigue  Negative for activity change, appetite change and unexpected weight change  Eyes: Negative for visual disturbance  Wears glasses   Respiratory: Negative for cough, chest tightness and shortness of breath  Cardiovascular: Negative for chest pain, palpitations and leg swelling  Gastrointestinal: Negative for abdominal pain, diarrhea, nausea and vomiting  Endocrine: Negative for cold intolerance, heat intolerance, polydipsia, polyphagia and polyuria  Genitourinary: Negative for frequency  Musculoskeletal: Negative for arthralgias and myalgias  Skin: Negative for color change and pallor  Allergic/Immunologic: Negative for immunocompromised state     Neurological: Negative for dizziness and headaches  Psychiatric/Behavioral: Negative for dysphoric mood  The patient is not nervous/anxious            Objective:  Recent Results (from the past 672 hour(s))   Vitamin D 25 hydroxy    Collection Time: 01/21/22  2:28 PM   Result Value Ref Range    25-HYDROXY VIT D 21 8 (L) 30 0 - 100 0 ng/mL   Comprehensive metabolic panel    Collection Time: 01/21/22  2:28 PM   Result Value Ref Range    Glucose, Random 151 (H) 65 - 99 mg/dL    BUN 21 6 - 24 mg/dL    Creatinine 0 97 0 76 - 1 27 mg/dL    eGFR Non African American 91 >59 mL/min/1 73    eGFR  105 >59 mL/min/1 73    SL AMB BUN/CREATININE RATIO 22 (H) 9 - 20    Sodium 139 134 - 144 mmol/L    Potassium 4 1 3 5 - 5 2 mmol/L    Chloride 101 96 - 106 mmol/L    CO2 23 20 - 29 mmol/L    CALCIUM 9 8 8 7 - 10 2 mg/dL    Protein, Total 7 2 6 0 - 8 5 g/dL    Albumin 4 3 4 0 - 5 0 g/dL    Globulin, Total 2 9 1 5 - 4 5 g/dL    Albumin/Globulin Ratio 1 5 1 2 - 2 2    TOTAL BILIRUBIN 0 6 0 0 - 1 2 mg/dL    Alk Phos Isoenzymes 58 44 - 121 IU/L    AST 44 (H) 0 - 40 IU/L    ALT 91 (H) 0 - 44 IU/L   CBC and differential    Collection Time: 01/21/22  2:28 PM   Result Value Ref Range    White Blood Cell Count 7 0 3 4 - 10 8 x10E3/uL    Red Blood Cell Count 5 00 4 14 - 5 80 x10E6/uL    Hemoglobin 15 0 13 0 - 17 7 g/dL    HCT 42 3 37 5 - 51 0 %    MCV 85 79 - 97 fL    MCH 30 0 26 6 - 33 0 pg    MCHC 35 5 31 5 - 35 7 g/dL    RDW 12 5 11 6 - 15 4 %    Platelet Count 681 192 - 450 x10E3/uL    Neutrophils 57 Not Estab  %    Lymphocytes 26 Not Estab  %    Monocytes 10 Not Estab  %    Eosinophils 5 Not Estab  %    Basophils PCT 0 Not Estab  %    Neutrophils (Absolute) 4 0 1 4 - 7 0 x10E3/uL    Lymphocytes (Absolute) 1 8 0 7 - 3 1 x10E3/uL    Monocytes (Absolute) 0 7 0 1 - 0 9 x10E3/uL    Eosinophils (Absolute) 0 4 0 0 - 0 4 x10E3/uL    Basophils ABS 0 0 0 0 - 0 2 x10E3/uL    Immature Granulocytes 2 Not Estab  %    Immature Granulocytes (Absolute) 0 1 0 0 - 0 1 x10E3/uL   Lipid panel    Collection Time: 01/21/22  2:28 PM   Result Value Ref Range    Cholesterol, Total 205 (H) 100 - 199 mg/dL    Triglycerides 216 (H) 0 - 149 mg/dL    HDL 22 (L) >39 mg/dL    VLDL Cholesterol Calculated 40 5 - 40 mg/dL    LDL Calculated 143 (H) 0 - 99 mg/dL    T  Chol/HDL Ratio 9 3 (H) 0 0 - 5 0 ratio   HEMOGLOBIN A1C W/ EAG ESTIMATION    Collection Time: 01/21/22  2:28 PM   Result Value Ref Range    Hemoglobin A1C 7 2 (H) 4 8 - 5 6 %    Estimated Average Glucose 160 mg/dL   Reviewed lab/diagnostic results with pt including both normal and abnormal findings  In depth counseling and instructions given  All questions answered during visit  3/22/21  Hemoglobin A1C 4 8 - 5 6 % 7 3 High      Cholesterol, Total 100 - 199 mg/dL 137  155  140  214 High     Triglycerides 0 - 149 mg/dL 170 High   253 High   200 High   142    HDL >39 mg/dL 24 Low   28 Low   22 Low   28 Low     VLDL Cholesterol Calculated 5 - 40 mg/dL 30  51 High   40  28    LDL Calculated 0 - 99 mg/dL 83         25-HYDROXY VIT D 30 0 - 100 0 ng/mL 45  /70 (BP Location: Right arm, Patient Position: Sitting, Cuff Size: Large)   Pulse 102   Temp 97 6 °F (36 4 °C) (Temporal)   Ht 5' 11" (1 803 m)   Wt 120 kg (264 lb)   SpO2 98%   BMI 36 82 kg/m²          Physical Exam  Vitals reviewed  Constitutional:       General: He is not in acute distress  Appearance: He is obese  He is not ill-appearing  Eyes:      General: No scleral icterus  Neck:      Vascular: No carotid bruit  Cardiovascular:      Rate and Rhythm: Normal rate and regular rhythm  Pulses: no weak pulses          Dorsalis pedis pulses are 2+ on the right side and 2+ on the left side  Posterior tibial pulses are 2+ on the right side and 2+ on the left side  Pulmonary:      Effort: Pulmonary effort is normal  No respiratory distress  Breath sounds: Normal breath sounds  No wheezing or rales     Abdominal:      General: There is no distension  Palpations: Abdomen is soft  Musculoskeletal:      Cervical back: Normal range of motion and neck supple  Right lower leg: No edema  Left lower leg: No edema  Feet:      Right foot:      Skin integrity: No ulcer, skin breakdown, erythema, warmth, callus or dry skin  Left foot:      Skin integrity: No ulcer, skin breakdown, erythema, warmth, callus or dry skin  Skin:     General: Skin is warm and dry  Coloration: Skin is not jaundiced or pale  Neurological:      General: No focal deficit present  Mental Status: He is alert and oriented to person, place, and time  Cranial Nerves: No cranial nerve deficit  Sensory: No sensory deficit  Psychiatric:         Mood and Affect: Mood normal          Behavior: Behavior normal          Thought Content: Thought content normal          Judgment: Judgment normal        Patient's shoes and socks removed  Right Foot/Ankle   Right Foot Inspection  Skin Exam: skin normal and skin intact  No dry skin, no warmth, no callus, no erythema, no maceration, no abnormal color, no pre-ulcer, no ulcer and no callus  Toe Exam: ROM and strength within normal limits  Sensory   Vibration: intact  Proprioception: intact  Monofilament testing: intact    Vascular  Capillary refills: < 3 seconds  The right DP pulse is 2+  The right PT pulse is 2+  Left Foot/Ankle  Left Foot Inspection  Skin Exam: skin normal and skin intact  No dry skin, no warmth, no erythema, no maceration, normal color, no pre-ulcer, no ulcer and no callus  Toe Exam: ROM and strength within normal limits  Sensory   Vibration: intact  Proprioception: intact  Monofilament testing: intact    Vascular  Capillary refills: < 3 seconds  The left DP pulse is 2+  The left PT pulse is 2+       Assign Risk Category  No deformity present  No loss of protective sensation  No weak pulses  Risk: 0

## 2022-02-01 NOTE — PATIENT INSTRUCTIONS
Heart healthy, carbohydrate controlled diet- limit red meat, limit saturated fat, moderate salt intake, limit junk food, etc    Regular exercise  Stress management  Routine labwork and screenings as ordered  Continue all medications as prescribed  10% - bad control"> 10% - bad control,Hemoglobin A1c (HbA1c) greater than 10% indicating poor diabetic control,Haemoglobin A1c greater than 10% indicating poor diabetic control">   Diabetes Mellitus Type 2 in Adults, Ambulatory Care   GENERAL INFORMATION:   Diabetes mellitus type 2  is a disease that affects how your body uses glucose (sugar)  Insulin helps move sugar out of the blood so it can be used for energy  Normally, when the blood sugar level increases, the pancreas makes more insulin  Type 2 diabetes develops because either the body cannot make enough insulin, or it cannot use the insulin correctly  After many years, your pancreas may stop making insulin  Common symptoms include the following:   · More hunger or thirst than usual     · Frequent urination     · Weight loss without trying     · Blurred vision  Seek immediate care for the following symptoms:   · Severe abdominal pain, or pain that spreads to your back  You may also be vomiting  · Trouble staying awake or focusing    · Shaking or sweating    · Blurred or double vision    · Breath has a fruity, sweet smell    · Breathing is deep and labored, or rapid and shallow    · Heartbeat is fast and weak  Treatment for diabetes mellitus type 2  includes keeping your blood sugar at a normal level  You must eat the right foods, and exercise regularly  You may also need medicine if you cannot control your blood sugar level with nutrition and exercise  Manage diabetes mellitus type 2:   · Check your blood sugar level  You will be taught how to check a small drop of blood in a glucose monitor  Ask your healthcare provider when and how often to check during the day   Ask your healthcare provider what your blood sugar levels should be when you check them  · Keep track of carbohydrates (sugar and starchy foods)  Your blood sugar level can get too high if you eat too many carbohydrates  Your dietitian will help you plan meals and snacks that have the right amount of carbohydrates  · Eat low-fat foods  Some examples are skinless chicken and low-fat milk  · Eat less sodium (salt)  Some examples of high-sodium foods to limit are soy sauce, potato chips, and soup  Do not add salt to food you cook  Limit your use of table salt  · Eat high-fiber foods  Foods that are a good source of fiber include vegetables, whole grain bread, and beans  · Limit alcohol  Alcohol affects your blood sugar level and can make it harder to manage your diabetes  Women should limit alcohol to 1 drink a day  Men should limit alcohol to 2 drinks a day  A drink of alcohol is 12 ounces of beer, 5 ounces of wine, or 1½ ounces of liquor  · Get regular exercise  Exercise can help keep your blood sugar level steady, decrease your risk of heart disease, and help you lose weight  Exercise for at least 30 minutes, 5 days a week  Include muscle strengthening activities 2 days each week  Work with your healthcare provider to create an exercise plan  · Check your feet each day  for injuries or open sores  Ask your healthcare provider for activities you can do if you have an open sore  · Quit smoking  If you smoke, it is never too late to quit  Smoking can worsen the problems that may occur with diabetes  Ask your healthcare provider for information about how to stop smoking if you are having trouble quitting  · Ask about your weight:  Ask healthcare providers if you need to lose weight, and how much to lose  Ask them to help you with a weight loss program  Even a 10 to 15 pound weight loss can help you manage your blood sugar level  · Carry medical alert identification    Wear medical alert jewelry or carry a card that says you have diabetes  Ask your healthcare provider where to get these items  · Ask about vaccines  Diabetes puts you at risk of serious illness if you get the flu, pneumonia, or hepatitis  Ask your healthcare provider if you should get a flu, pneumonia, or hepatitis B vaccine, and when to get the vaccine  Follow up with your healthcare provider as directed:  Write down your questions so you remember to ask them during your visits  CARE AGREEMENT:   You have the right to help plan your care  Learn about your health condition and how it may be treated  Discuss treatment options with your caregivers to decide what care you want to receive  You always have the right to refuse treatment  The above information is an  only  It is not intended as medical advice for individual conditions or treatments  Talk to your doctor, nurse or pharmacist before following any medical regimen to see if it is safe and effective for you  © 2014 [address] is for End User's use only and may not be sold, redistributed or otherwise used for commercial purposes  All illustrations and images included in CareNotes® are the copyrighted property of [employer] D [employer]  or Andres Freeman

## 2022-02-02 LAB
B BURGDOR IGG+IGM SER-ACNC: <0.91 ISR (ref 0–0.9)
TESTOST SERPL-MCNC: 280 NG/DL (ref 264–916)
TSH SERPL DL<=0.005 MIU/L-ACNC: 1.77 UIU/ML (ref 0.45–4.5)

## 2022-03-02 ENCOUNTER — OFFICE VISIT (OUTPATIENT)
Dept: FAMILY MEDICINE CLINIC | Facility: CLINIC | Age: 52
End: 2022-03-02
Payer: COMMERCIAL

## 2022-03-02 VITALS
HEIGHT: 71 IN | DIASTOLIC BLOOD PRESSURE: 80 MMHG | WEIGHT: 255 LBS | BODY MASS INDEX: 35.7 KG/M2 | TEMPERATURE: 97.4 F | OXYGEN SATURATION: 99 % | HEART RATE: 95 BPM | SYSTOLIC BLOOD PRESSURE: 130 MMHG

## 2022-03-02 DIAGNOSIS — J01.01 ACUTE RECURRENT MAXILLARY SINUSITIS: Primary | ICD-10-CM

## 2022-03-02 PROCEDURE — 3008F BODY MASS INDEX DOCD: CPT | Performed by: NURSE PRACTITIONER

## 2022-03-02 PROCEDURE — 1036F TOBACCO NON-USER: CPT | Performed by: NURSE PRACTITIONER

## 2022-03-02 PROCEDURE — 99213 OFFICE O/P EST LOW 20 MIN: CPT | Performed by: NURSE PRACTITIONER

## 2022-03-02 RX ORDER — AMOXICILLIN AND CLAVULANATE POTASSIUM 875; 125 MG/1; MG/1
1 TABLET, FILM COATED ORAL EVERY 12 HOURS SCHEDULED
Qty: 20 TABLET | Refills: 0 | Status: SHIPPED | OUTPATIENT
Start: 2022-03-02 | End: 2022-03-12

## 2022-03-02 NOTE — PATIENT INSTRUCTIONS
Augmentin 875mg twice daily for 10 days  Fluids  Rest  Nasal saline rinses  Symptom management for supportive care such as decongestants, tylenol/motrin as needed for fever or discomfort  Use a cool mist humidifier at bedtime  Flonase as directed  Finish antibiotics as prescribed  Warm compresses to facilitate nasal drainage  Call or return to office if symptoms worsen or if new symptoms develop

## 2022-03-02 NOTE — PROGRESS NOTES
Assessment/Plan:  1  Acute recurrent maxillary sinusitis  Augmentin 875mg twice daily for 10 days  Fluids  Rest  Nasal saline rinses  Symptom management for supportive care such as decongestants, tylenol/motrin as needed for fever or discomfort  Use a cool mist humidifier at bedtime  Flonase as directed  Finish antibiotics as prescribed  Warm compresses to facilitate nasal drainage  Call or return to office if symptoms worsen or if new symptoms develop  - amoxicillin-clavulanate (Augmentin) 875-125 mg per tablet; Take 1 tablet by mouth every 12 (twelve) hours for 10 days  Dispense: 20 tablet; Refill: 0      Patient was counseled regarding instructions for management which included: impression/diagnosis, risk/benefits of treatment options, importance of compliance with treatment, risk factor reduction, and prognosis  I have reviewed the instructions with the patient answering all questions and patient verbalized understanding  Subjective:      Patient ID: Heidi Moya is a 49 y o  male who presents for cold symptoms    Here for cold symptoms  Started about one week ago  Initially sore throat and pnd  History of recurrent sinus infections  Today with nasal congestion, burning in sinuses, green nasal discharge  Took mucinex  No fever  Mild cough but thinks from pnd  Has nasal lavage system at home but has not been doing regularly  Has flonase at home but has not been using  The following portions of the patient's history were reviewed and updated as appropriate: allergies, current medications, past family history, past medical history, past social history, past surgical history and problem list     Review of Systems   Constitutional: Negative for fatigue and fever  HENT: Positive for congestion, postnasal drip, rhinorrhea, sinus pressure, sinus pain and sore throat  Respiratory: Negative for cough and shortness of breath      Gastrointestinal: Negative for diarrhea, nausea and vomiting  Musculoskeletal: Negative for myalgias  Neurological: Positive for headaches  Negative for dizziness  Hematological: Negative for adenopathy  Objective:      /80   Pulse 95   Temp (!) 97 4 °F (36 3 °C) (Temporal)   Ht 5' 11" (1 803 m)   Wt 116 kg (255 lb)   SpO2 99%   BMI 35 57 kg/m²          Physical Exam  Vitals reviewed  Constitutional:       General: He is not in acute distress  HENT:      Right Ear: Tympanic membrane normal       Left Ear: Tympanic membrane normal       Nose: Congestion present  Comments: (+) PND  Turbinates inflamed  Pain with palpation over max sinuses     Mouth/Throat:      Mouth: Mucous membranes are moist       Pharynx: Oropharynx is clear  Cardiovascular:      Rate and Rhythm: Normal rate and regular rhythm  Pulmonary:      Effort: Pulmonary effort is normal  No respiratory distress  Breath sounds: Normal breath sounds  Lymphadenopathy:      Cervical: No cervical adenopathy  Skin:     General: Skin is warm and dry  Neurological:      Mental Status: He is alert and oriented to person, place, and time     Psychiatric:         Mood and Affect: Mood normal

## 2022-05-02 DIAGNOSIS — E11.9 TYPE 2 DIABETES MELLITUS WITHOUT COMPLICATION, WITHOUT LONG-TERM CURRENT USE OF INSULIN (HCC): ICD-10-CM

## 2022-05-02 NOTE — TELEPHONE ENCOUNTER
Requested medication(s) are due for refill today: Yes  Patient has already received a courtesy refill: No  Other reason request has been forwarded to provider:  eGFR is 30 or above and within 360 days

## 2022-05-11 ENCOUNTER — OFFICE VISIT (OUTPATIENT)
Dept: FAMILY MEDICINE CLINIC | Facility: CLINIC | Age: 52
End: 2022-05-11
Payer: COMMERCIAL

## 2022-05-11 ENCOUNTER — APPOINTMENT (OUTPATIENT)
Dept: RADIOLOGY | Facility: CLINIC | Age: 52
End: 2022-05-11
Payer: COMMERCIAL

## 2022-05-11 ENCOUNTER — OFFICE VISIT (OUTPATIENT)
Dept: OBGYN CLINIC | Facility: CLINIC | Age: 52
End: 2022-05-11
Payer: COMMERCIAL

## 2022-05-11 VITALS
HEART RATE: 94 BPM | BODY MASS INDEX: 36.82 KG/M2 | HEIGHT: 71 IN | SYSTOLIC BLOOD PRESSURE: 123 MMHG | WEIGHT: 263 LBS | DIASTOLIC BLOOD PRESSURE: 83 MMHG

## 2022-05-11 VITALS
HEIGHT: 71 IN | WEIGHT: 263 LBS | HEART RATE: 97 BPM | OXYGEN SATURATION: 68 % | SYSTOLIC BLOOD PRESSURE: 110 MMHG | DIASTOLIC BLOOD PRESSURE: 60 MMHG | BODY MASS INDEX: 36.82 KG/M2 | TEMPERATURE: 98 F

## 2022-05-11 DIAGNOSIS — G89.29 CHRONIC BILATERAL THORACIC BACK PAIN: ICD-10-CM

## 2022-05-11 DIAGNOSIS — G89.29 CHRONIC MIDLINE THORACIC BACK PAIN: Primary | ICD-10-CM

## 2022-05-11 DIAGNOSIS — M54.2 NECK PAIN: ICD-10-CM

## 2022-05-11 DIAGNOSIS — M54.6 CHRONIC MIDLINE THORACIC BACK PAIN: Primary | ICD-10-CM

## 2022-05-11 DIAGNOSIS — M54.6 CHRONIC BILATERAL THORACIC BACK PAIN: ICD-10-CM

## 2022-05-11 DIAGNOSIS — M54.2 NECK PAIN: Primary | ICD-10-CM

## 2022-05-11 DIAGNOSIS — M62.838 TRAPEZIUS MUSCLE SPASM: ICD-10-CM

## 2022-05-11 PROCEDURE — 1036F TOBACCO NON-USER: CPT | Performed by: ORTHOPAEDIC SURGERY

## 2022-05-11 PROCEDURE — 99214 OFFICE O/P EST MOD 30 MIN: CPT | Performed by: NURSE PRACTITIONER

## 2022-05-11 PROCEDURE — 72072 X-RAY EXAM THORAC SPINE 3VWS: CPT

## 2022-05-11 PROCEDURE — 72050 X-RAY EXAM NECK SPINE 4/5VWS: CPT

## 2022-05-11 PROCEDURE — 99203 OFFICE O/P NEW LOW 30 MIN: CPT | Performed by: ORTHOPAEDIC SURGERY

## 2022-05-11 PROCEDURE — 3008F BODY MASS INDEX DOCD: CPT | Performed by: NURSE PRACTITIONER

## 2022-05-11 RX ORDER — METHOCARBAMOL 500 MG/1
500 TABLET, FILM COATED ORAL 2 TIMES DAILY PRN
Qty: 60 TABLET | Refills: 0 | Status: SHIPPED | OUTPATIENT
Start: 2022-05-11 | End: 2022-06-28 | Stop reason: SDUPTHER

## 2022-05-11 RX ORDER — MELOXICAM 15 MG/1
15 TABLET ORAL DAILY
Qty: 30 TABLET | Refills: 0 | Status: SHIPPED | OUTPATIENT
Start: 2022-05-11 | End: 2022-06-28 | Stop reason: SDUPTHER

## 2022-05-11 NOTE — PROGRESS NOTES
Assessment/Plan:  1  Neck pain  - XR spine thoracic 3 vw; Future  - XR spine cervical complete 4 or 5 vw non injury; Future  - Ambulatory Referral to Orthopedic Surgery; Future  - meloxicam (Mobic) 15 mg tablet; Take 1 tablet (15 mg total) by mouth in the morning  Dispense: 30 tablet; Refill: 0  - methocarbamol (ROBAXIN) 500 mg tablet; Take 1 tablet (500 mg total) by mouth as needed in the morning and 1 tablet (500 mg total) as needed in the evening for muscle spasms  Dispense: 60 tablet; Refill: 0    2  Chronic bilateral thoracic back pain  - XR spine thoracic 3 vw; Future  - XR spine cervical complete 4 or 5 vw non injury; Future  - Ambulatory Referral to Orthopedic Surgery; Future  - meloxicam (Mobic) 15 mg tablet; Take 1 tablet (15 mg total) by mouth in the morning  Dispense: 30 tablet; Refill: 0  - methocarbamol (ROBAXIN) 500 mg tablet; Take 1 tablet (500 mg total) by mouth as needed in the morning and 1 tablet (500 mg total) as needed in the evening for muscle spasms  Dispense: 60 tablet; Refill: 0      Meloxicam 15mg daily with food (anti-inlammatory) until seen by orthopedics  Robaxin 500mg (muscle relaxer) twice daily as needed for pain/muscle spasm  Do not drive or operate machinery when taking this medication  Xrays have been ordered and should be done today  You have been given a referral to orthpedics, Dr Marlin Justice  Please schedule appt  Highly recommended physical therapy  Order for therapy was given today and you are encouraged to schedule an appt  Alternate ice/heat, 20 minutes on/20 minutes off  Call or return to office if symptoms worsen or if new symptoms develop  Subjective:      Patient ID: Librado Sykes is a 49 y o  male who presents for back and neck pain    Here for neck and mid back pain     Symptoms for over 2 years  Feels like getting worse  No radiation to arms or legs  Some numbness at times, base of neck  Works construction, physical work  Taking advil prn  Never seen ortho      The following portions of the patient's history were reviewed and updated as appropriate: allergies, current medications, past family history, past medical history, past social history, past surgical history and problem list     Review of Systems   Constitutional: Negative for unexpected weight change  Respiratory: Negative for chest tightness and shortness of breath  Cardiovascular: Negative for chest pain and palpitations  Gastrointestinal: Negative for abdominal pain  Genitourinary: Negative for dysuria, flank pain and frequency  Musculoskeletal: Positive for back pain and neck pain  Skin: Negative for rash and wound  Allergic/Immunologic: Negative for immunocompromised state  Neurological: Positive for numbness  Negative for dizziness and headaches  Objective:      /60   Pulse 97   Temp 98 °F (36 7 °C) (Temporal)   Ht 5' 11" (1 803 m)   Wt 119 kg (263 lb)   SpO2 (!) 68%   BMI 36 68 kg/m²          Physical Exam  Constitutional:       General: He is not in acute distress  Appearance: He is obese  He is not ill-appearing  Neck:      Comments: Full cervical rom  No point cervical tenderess  Cardiovascular:      Rate and Rhythm: Normal rate and regular rhythm  Pulmonary:      Effort: Pulmonary effort is normal  No respiratory distress  Musculoskeletal:         General: No signs of injury  Cervical back: No rigidity or tenderness  Comments: Mild point tenderness thoracic vertebra  Full lumbar rom     Skin:     General: Skin is warm and dry  Coloration: Skin is not jaundiced or pale  Neurological:      General: No focal deficit present  Mental Status: He is alert and oriented to person, place, and time        Coordination: Coordination normal       Gait: Gait normal       Comments: Neg SLR b/l  No reproducible radicular symptoms   Psychiatric:         Mood and Affect: Mood normal          Behavior: Behavior normal          Thought Content: Thought content normal          Judgment: Judgment normal

## 2022-05-11 NOTE — PATIENT INSTRUCTIONS
Meloxicam 15mg daily with food (anti-inlammatory) until seen by orthopedics  Robaxin 500mg (muscle relaxer) twice daily as needed for pain/muscle spasm  Do not drive or operate machinery when taking this medication  Xrays have been ordered and should be done today  You have been given a referral to orthpedics, Dr Aditi García  Please schedule appt  Highly recommended physical therapy  Order for therapy was given today and you are encouraged to schedule an appt  Alternate ice/heat, 20 minutes on/20 minutes off  Call or return to office if symptoms worsen or if new symptoms develop

## 2022-05-11 NOTE — PROGRESS NOTES
Assessment/Plan:  1  Chronic midline thoracic back pain  Ambulatory referral to Physical Therapy   2  Trapezius muscle spasm  Ambulatory referral to Physical Therapy       Tyler Salguero has thoracic back pain which seems muscular in nature  He does have poor posture and hyper kyphosis on exam   He does have some degenerative changes and spurring throughout his thoracic spine which may be giving him the sensation needing to pop  I recommended undergoing formal physical therapy for both his neck and thoracic region at this time  If the pain persists or worsens we could consider further imaging or alternative treatments  He verbalized understanding this plan will follow-up after therapy if needed  Subjective:   Dimas Lopez is a 49 y o  male who presents to the office for chronic upper back and neck discomfort  He denies any recent injury or trauma  He was referred by his PCP office earlier today  He did x-rays earlier today as well  He states he has had a long history of discomfort in his thoracic region of his spine with radiating pain into his neck  He denies any numbness or tingling or radiating pain down his arms  He denies any trauma or car accident leading to the pain  He feels like he always has sensation of knee to pop his back but he can never get the crack  He has not tried any treatment  He has not seen a chiropractor another physician  He has not tried any medications or injections  Review of Systems   Constitutional: Negative for chills, fever and unexpected weight change  HENT: Negative for hearing loss, nosebleeds and sore throat  Eyes: Negative for pain, redness and visual disturbance  Respiratory: Negative for cough, shortness of breath and wheezing  Cardiovascular: Negative for chest pain, palpitations and leg swelling  Gastrointestinal: Negative for abdominal pain, nausea and vomiting  Endocrine: Negative for polyphagia and polyuria     Genitourinary: Negative for dysuria and hematuria  Musculoskeletal:        See HPI   Skin: Negative for rash and wound  Neurological: Negative for dizziness, numbness and headaches  Psychiatric/Behavioral: Negative for decreased concentration and suicidal ideas  The patient is not nervous/anxious            Past Medical History:   Diagnosis Date    Class 2 severe obesity due to excess calories with serious comorbidity and body mass index (BMI) of 39 0 to 39 9 in adult [facility]) 3/5/2019    Diabetes 1 5, managed as type 2 ([facility] ) 3/5/2019    Essential hypertension 3/5/2019    Morbid obesity with BMI of 40 0-44 9, adult ([facility] ) 3/5/2019       Past Surgical History:   Procedure Laterality Date    FRACTURE SURGERY      left ankle, plates and screws        Family History   Problem Relation Age of Onset    Heart attack Maternal Grandmother     Diabetes Maternal Grandfather     Diabetes Paternal Grandmother     Mental illness Neg Hx     Substance Abuse Neg Hx        Social History     Occupational History    Not on file   Tobacco Use    Smoking status: Former Smoker     Types: Cigarettes     Quit date: 2004     Years since quittin 2    Smokeless tobacco: Former User     Quit date: 1999   Vaping Use    Vaping Use: Never used   Substance and Sexual Activity    Alcohol use: No    Drug use: No    Sexual activity: Not on file         Current Outpatient Medications:     albuterol (Ventolin HFA) 90 mcg/act inhaler, Inhale 2 puffs every 6 (six) hours as needed for wheezing (Patient not taking: No sig reported), Disp: 18 g, Rfl: 0    ASPIRIN 81 PO, Take by mouth daily, Disp: , Rfl:     atorvastatin (LIPITOR) 20 mg tablet, Take 1 tablet (20 mg total) by mouth daily, Disp: 90 tablet, Rfl: 1    Blood Glucose Monitoring Suppl (ONETOUCH VERIO FLEX SYSTEM) w/Device KIT, CHECK BS DAILY (Patient not taking: Reported on 2022), Disp: , Rfl:     ergocalciferol (VITAMIN D2) 50,000 units, Take 1 capsule (50,000 Units total) by mouth once a week, Disp: 12 capsule, Rfl: 1    fenofibrate (TRICOR) 54 MG tablet, Take 1 tablet (54 mg total) by mouth daily, Disp: 90 tablet, Rfl: 1    Lancets ([address]) [facility], use 1 LANCET to TEST BLOOD SUGAR once daily (Patient not taking: Reported on 5/11/2022), Disp: , Rfl:     lisinopril (ZESTRIL) 10 mg tablet, Take 1 tablet (10 mg total) by mouth daily, Disp: 90 tablet, Rfl: 1    meloxicam (Mobic) 15 mg tablet, Take 1 tablet (15 mg total) by mouth in the morning , Disp: 30 tablet, Rfl: 0    metFORMIN (GLUCOPHAGE) 500 mg tablet, TAKE TWO TABLETS (1,000 MG TOTAL) BY MOUTH TWO (TWO) TIMES A DAY WITH MEALS, Disp: 360 tablet, Rfl: 0    methocarbamol (ROBAXIN) 500 mg tablet, Take 1 tablet (500 mg total) by mouth as needed in the morning and 1 tablet (500 mg total) as needed in the evening for muscle spasms  , Disp: 60 tablet, Rfl: 0    ONETOUCH VERIO test strip, use 1 TEST STRIP to TEST BLOOD SUGAR once daily (Patient not taking: Reported on 5/11/2022), Disp: , Rfl:     Allergies   Allergen Reactions    No Known Allergies        Objective:  Vitals:    05/11/22 1326   BP: 123/83   Pulse: 94       Ortho Exam    Physical Exam  Vitals and nursing note reviewed  Constitutional:       Appearance: He is well-developed  HENT:      Head: Normocephalic and atraumatic  Eyes:      General: No scleral icterus  Conjunctiva/sclera: Conjunctivae normal    Neck:        Comments: Negative Spurling's maneuver bilaterally  Full strength and sensation bilateral upper extremities  Cardiovascular:      Rate and Rhythm: Normal rate  Pulmonary:      Effort: Pulmonary effort is normal  No respiratory distress  Musculoskeletal:      Cervical back: Normal range of motion and neck supple  Tenderness present  No spasms  No spinous process tenderness or muscular tenderness  Normal range of motion  Back:       Comments: As noted in HPI   Skin:     General: Skin is warm and dry     Neurological: Mental Status: He is alert and oriented to person, place, and time  Psychiatric:         Behavior: Behavior normal          I have personally reviewed pertinent films in PACS and my interpretation is as follows: Thoracic x-rays demonstrate mild degenerative changes and increased thoracic kyphosis    No fracture  Cervical x-rays demonstrate mild degenerative changes without evidence of fracture

## 2022-05-16 DIAGNOSIS — I10 ESSENTIAL HYPERTENSION: ICD-10-CM

## 2022-05-16 PROCEDURE — 4010F ACE/ARB THERAPY RXD/TAKEN: CPT | Performed by: ORTHOPAEDIC SURGERY

## 2022-05-16 RX ORDER — LISINOPRIL 10 MG/1
10 TABLET ORAL DAILY
Qty: 90 TABLET | Refills: 0 | Status: SHIPPED | OUTPATIENT
Start: 2022-05-16

## 2022-06-28 DIAGNOSIS — M54.6 CHRONIC BILATERAL THORACIC BACK PAIN: ICD-10-CM

## 2022-06-28 DIAGNOSIS — G89.29 CHRONIC BILATERAL THORACIC BACK PAIN: ICD-10-CM

## 2022-06-28 DIAGNOSIS — M54.2 NECK PAIN: ICD-10-CM

## 2022-06-28 RX ORDER — MELOXICAM 15 MG/1
15 TABLET ORAL DAILY
Qty: 30 TABLET | Refills: 0 | Status: SHIPPED | OUTPATIENT
Start: 2022-06-28

## 2022-06-28 RX ORDER — METHOCARBAMOL 500 MG/1
500 TABLET, FILM COATED ORAL 2 TIMES DAILY PRN
Qty: 60 TABLET | Refills: 0 | Status: SHIPPED | OUTPATIENT
Start: 2022-06-28

## 2022-06-28 NOTE — TELEPHONE ENCOUNTER
Requested medication(s) are due for refill today: No  Patient has already received a courtesy refill: Yes  Other reason request has been forwarded to provider:   Analgesics:  Muscle Relaxants Failed 06/28/2022 11:37 AM   Protocol Details  This refill cannot be delegated

## 2022-08-11 ENCOUNTER — TELEPHONE (OUTPATIENT)
Dept: FAMILY MEDICINE CLINIC | Facility: CLINIC | Age: 52
End: 2022-08-11

## 2022-08-11 NOTE — TELEPHONE ENCOUNTER
----- Message from [address] [place] [address] sent at 8/11/2022  1:57 PM EDT -----  Regarding: labs  Pt is scheduled for physical on 8/30  Lab orders in chart  Needs to have done prior to appt  Please call to advise  TY    Tried calling pt to advise him to have his labs done prior to his appt  The mailbox is full   Mailed lab orders and note advising pt to have labs done prior to his appt on 8/30

## 2022-08-25 ENCOUNTER — CLINICAL SUPPORT (OUTPATIENT)
Dept: FAMILY MEDICINE CLINIC | Facility: CLINIC | Age: 52
End: 2022-08-25
Payer: COMMERCIAL

## 2022-08-25 DIAGNOSIS — E11.9 TYPE 2 DIABETES MELLITUS WITHOUT COMPLICATION, WITHOUT LONG-TERM CURRENT USE OF INSULIN (HCC): Primary | ICD-10-CM

## 2022-08-25 DIAGNOSIS — I10 ESSENTIAL HYPERTENSION: ICD-10-CM

## 2022-08-25 DIAGNOSIS — Z11.59 NEED FOR HEPATITIS C SCREENING TEST: ICD-10-CM

## 2022-08-25 DIAGNOSIS — E55.9 VITAMIN D DEFICIENCY: ICD-10-CM

## 2022-08-25 DIAGNOSIS — E78.2 MIXED HYPERLIPIDEMIA: ICD-10-CM

## 2022-08-25 PROCEDURE — 36415 COLL VENOUS BLD VENIPUNCTURE: CPT

## 2022-08-26 LAB
25(OH)D3+25(OH)D2 SERPL-MCNC: 49.1 NG/ML (ref 30–100)
ALBUMIN SERPL-MCNC: 4.6 G/DL (ref 4–5)
ALBUMIN/GLOB SERPL: 1.6 {RATIO} (ref 1.2–2.2)
ALP SERPL-CCNC: 49 IU/L (ref 44–121)
ALT SERPL-CCNC: 81 IU/L (ref 0–44)
AST SERPL-CCNC: 45 IU/L (ref 0–40)
BASOPHILS # BLD AUTO: 0 X10E3/UL (ref 0–0.2)
BASOPHILS NFR BLD AUTO: 1 %
BILIRUB SERPL-MCNC: 0.5 MG/DL (ref 0–1.2)
BUN SERPL-MCNC: 16 MG/DL (ref 6–24)
BUN/CREAT SERPL: 15 (ref 9–20)
CALCIUM SERPL-MCNC: 10.3 MG/DL (ref 8.7–10.2)
CHLORIDE SERPL-SCNC: 103 MMOL/L (ref 96–106)
CHOLEST SERPL-MCNC: 150 MG/DL (ref 100–199)
CHOLEST/HDLC SERPL: 6.5 RATIO (ref 0–5)
CO2 SERPL-SCNC: 24 MMOL/L (ref 20–29)
CREAT SERPL-MCNC: 1.09 MG/DL (ref 0.76–1.27)
EGFR: 83 ML/MIN/1.73
EOSINOPHIL # BLD AUTO: 0.2 X10E3/UL (ref 0–0.4)
EOSINOPHIL NFR BLD AUTO: 3 %
ERYTHROCYTE [DISTWIDTH] IN BLOOD BY AUTOMATED COUNT: 12.9 % (ref 11.6–15.4)
EST. AVERAGE GLUCOSE BLD GHB EST-MCNC: 166 MG/DL
GLOBULIN SER-MCNC: 2.8 G/DL (ref 1.5–4.5)
GLUCOSE SERPL-MCNC: 191 MG/DL (ref 65–99)
HBA1C MFR BLD: 7.4 % (ref 4.8–5.6)
HCT VFR BLD AUTO: 42.4 % (ref 37.5–51)
HCV AB S/CO SERPL IA: <0.1 S/CO RATIO (ref 0–0.9)
HDLC SERPL-MCNC: 23 MG/DL
HGB BLD-MCNC: 14.6 G/DL (ref 13–17.7)
IMM GRANULOCYTES # BLD: 0 X10E3/UL (ref 0–0.1)
IMM GRANULOCYTES NFR BLD: 0 %
LDLC SERPL CALC-MCNC: 93 MG/DL (ref 0–99)
LYMPHOCYTES # BLD AUTO: 2.6 X10E3/UL (ref 0.7–3.1)
LYMPHOCYTES NFR BLD AUTO: 34 %
MCH RBC QN AUTO: 30.8 PG (ref 26.6–33)
MCHC RBC AUTO-ENTMCNC: 34.4 G/DL (ref 31.5–35.7)
MCV RBC AUTO: 90 FL (ref 79–97)
MONOCYTES # BLD AUTO: 0.5 X10E3/UL (ref 0.1–0.9)
MONOCYTES NFR BLD AUTO: 6 %
NEUTROPHILS # BLD AUTO: 4.3 X10E3/UL (ref 1.4–7)
NEUTROPHILS NFR BLD AUTO: 56 %
PLATELET # BLD AUTO: 276 X10E3/UL (ref 150–450)
POTASSIUM SERPL-SCNC: 4.2 MMOL/L (ref 3.5–5.2)
PROT SERPL-MCNC: 7.4 G/DL (ref 6–8.5)
RBC # BLD AUTO: 4.74 X10E6/UL (ref 4.14–5.8)
SL AMB VLDL CHOLESTEROL CALC: 34 MG/DL (ref 5–40)
SODIUM SERPL-SCNC: 141 MMOL/L (ref 134–144)
TRIGL SERPL-MCNC: 196 MG/DL (ref 0–149)
WBC # BLD AUTO: 7.7 X10E3/UL (ref 3.4–10.8)

## 2022-08-30 ENCOUNTER — OFFICE VISIT (OUTPATIENT)
Dept: FAMILY MEDICINE CLINIC | Facility: CLINIC | Age: 52
End: 2022-08-30
Payer: COMMERCIAL

## 2022-08-30 VITALS
WEIGHT: 263 LBS | OXYGEN SATURATION: 98 % | TEMPERATURE: 97.8 F | HEIGHT: 71 IN | BODY MASS INDEX: 36.82 KG/M2 | SYSTOLIC BLOOD PRESSURE: 130 MMHG | HEART RATE: 94 BPM | DIASTOLIC BLOOD PRESSURE: 74 MMHG

## 2022-08-30 DIAGNOSIS — M54.6 CHRONIC BILATERAL THORACIC BACK PAIN: ICD-10-CM

## 2022-08-30 DIAGNOSIS — M54.2 NECK PAIN: ICD-10-CM

## 2022-08-30 DIAGNOSIS — I10 ESSENTIAL HYPERTENSION: ICD-10-CM

## 2022-08-30 DIAGNOSIS — Z00.00 ANNUAL PHYSICAL EXAM: Primary | ICD-10-CM

## 2022-08-30 DIAGNOSIS — E11.9 TYPE 2 DIABETES MELLITUS WITHOUT COMPLICATION, WITHOUT LONG-TERM CURRENT USE OF INSULIN (HCC): ICD-10-CM

## 2022-08-30 DIAGNOSIS — G89.29 CHRONIC BILATERAL THORACIC BACK PAIN: ICD-10-CM

## 2022-08-30 DIAGNOSIS — E78.1 HYPERTRIGLYCERIDEMIA: ICD-10-CM

## 2022-08-30 DIAGNOSIS — E78.2 MIXED HYPERLIPIDEMIA: ICD-10-CM

## 2022-08-30 DIAGNOSIS — M62.830 MUSCLE SPASM OF BACK: Primary | ICD-10-CM

## 2022-08-30 PROCEDURE — 99396 PREV VISIT EST AGE 40-64: CPT | Performed by: NURSE PRACTITIONER

## 2022-08-30 RX ORDER — FENOFIBRATE 54 MG/1
54 TABLET ORAL DAILY
Qty: 90 TABLET | Refills: 1 | Status: SHIPPED | OUTPATIENT
Start: 2022-08-30

## 2022-08-30 RX ORDER — MELOXICAM 15 MG/1
15 TABLET ORAL DAILY
Qty: 90 TABLET | Refills: 1 | Status: SHIPPED | OUTPATIENT
Start: 2022-08-30 | End: 2023-02-01 | Stop reason: SDUPTHER

## 2022-08-30 RX ORDER — METHOCARBAMOL 500 MG/1
500 TABLET, FILM COATED ORAL 2 TIMES DAILY PRN
Qty: 120 TABLET | Refills: 1 | Status: SHIPPED | OUTPATIENT
Start: 2022-08-30 | End: 2023-02-01 | Stop reason: SDUPTHER

## 2022-08-30 RX ORDER — ATORVASTATIN CALCIUM 20 MG/1
20 TABLET, FILM COATED ORAL DAILY
Qty: 90 TABLET | Refills: 1 | Status: SHIPPED | OUTPATIENT
Start: 2022-08-30

## 2022-08-30 RX ORDER — LISINOPRIL 10 MG/1
10 TABLET ORAL DAILY
Qty: 90 TABLET | Refills: 1 | Status: SHIPPED | OUTPATIENT
Start: 2022-08-30

## 2022-08-30 NOTE — PATIENT INSTRUCTIONS

## 2022-08-30 NOTE — PROGRESS NOTES
FAMILY PRACTICE HEALTH MAINTENANCE OFFICE VISIT  [facility] - [facility]    NAME: Luis Daniel Goss  AGE: 49 y o  SEX: male  : 1970     DATE: 2022    Assessment and Plan     1  Annual physical exam  Heart healthy, carbohydrate controlled diet- limit red meat, limit saturated fat, moderate salt intake, limit junk food, etc    Regular exercise  Stress management  Routine labwork and screenings as ordered  2  Type 2 diabetes mellitus without complication, without long-term current use of insulin (HCC)  Stable  Carb controlled diet  Weight loss  Regular exercise  - metFORMIN (GLUCOPHAGE) 500 mg tablet; Take 2 tablets (1,000 mg total) by mouth 2 (two) times a day with meals  Dispense: 360 tablet; Refill: 1  3  Mixed hyperlipidemia  Heart healthy diet  Statin  Fibrate  Weight loss  - atorvastatin (LIPITOR) 20 mg tablet; Take 1 tablet (20 mg total) by mouth daily  Dispense: 90 tablet; Refill: 1  - fenofibrate (TRICOR) 54 MG tablet; Take 1 tablet (54 mg total) by mouth daily  Dispense: 90 tablet; Refill: 1  4  Hypertriglyceridemia  Heart healthy diet  Statin  Fibrate  Weight loss  - fenofibrate (TRICOR) 54 MG tablet; Take 1 tablet (54 mg total) by mouth daily  Dispense: 90 tablet; Refill: 1  5  Essential hypertension  Stable  Continue blood pressure medications as ordered  Monitor blood pressure  Limit salt in diet  - lisinopril (ZESTRIL) 10 mg tablet; Take 1 tablet (10 mg total) by mouth daily  Dispense: 90 tablet;  Refill: 1      · Patient Counseling:   · Nutrition: Stressed importance of a well balanced diet, moderation of sodium/saturated fat, caloric balance and sufficient intake of fiber  · Exercise: Stressed the importance of regular exercise with a goal of 150 minutes per week  · Dental Health: Discussed daily flossing and brushing and regular dental visits   · Alcohol Use:  Recommended moderation of alcohol intake  · Injury Prevention: Discussed Safety Belts, Safety Helmets, and Smoke Detectors    · Immunizations reviewed: Risks and Benefits discussed  · Discussed benefits of:  Colon Cancer Screening and Screening labs   BMI Counseling: Body mass index is 36 68 kg/m²  Discussed with patient's BMI with him  The BMI is above normal  Nutrition recommendations include reducing portion sizes, decreasing overall calorie intake, moderation in carbohydrate intake, reducing intake of saturated fat and trans fat and reducing intake of cholesterol  Exercise recommendations include exercising 3-5 times per week  Return in 6 months (on 2/28/2023) for Diabetes follow-up  Chief Complaint     Chief Complaint   Patient presents with    Physical Exam    lab review       History of Present Illness     Here for annual exam  No recent illness  Feels well  Taking all meds as prescribed  No specific concerns or issues  Well Adult Physical   Patient here for a comprehensive physical exam       Diet and Physical Activity  Diet: poor diet  Exercise: infrequently; states walks about 5 miles per day when working      Depression Screen  PHQ-2/9 Depression Screening    Little interest or pleasure in doing things: 0 - not at all  Feeling down, depressed, or hopeless: 0 - not at all  PHQ-2 Score: 0  PHQ-2 Interpretation: Negative depression screen     Depression Screening Follow-up Plan: Patient's depression screening was negative with a PHQ-2 score of 0  Clinically patient does not have depression  No treatment is required         General Health  Hearing: Normal:  bilateral  Vision: most recent eye exam >1 year and wears glasses  Dental: regular dental visits          The following portions of the patient's history were reviewed and updated as appropriate: allergies, current medications, past family history, past medical history, past social history, past surgical history and problem list     Review of Systems     Review of Systems   Constitutional: Negative for chills, diaphoresis, fatigue and fever  HENT: Negative for congestion, sinus pressure, sinus pain and sore throat  Eyes: Negative for visual disturbance  Respiratory: Negative for cough, chest tightness, shortness of breath and wheezing  Cardiovascular: Negative for chest pain, palpitations and leg swelling  Gastrointestinal: Negative for abdominal distention, abdominal pain, diarrhea and nausea  Endocrine: Negative for polydipsia and polyuria  Genitourinary: Negative for dysuria, frequency and urgency  Musculoskeletal: Positive for back pain and neck pain  Negative for arthralgias  Chronic neck and upper back pain  Takes robaxin at night and really helps with spasm  Takes meloxicam daily  Goes to chiro   Skin: Negative for rash and wound  Allergic/Immunologic: Negative for immunocompromised state  Neurological: Negative for dizziness, weakness and headaches  Hematological: Negative for adenopathy  Psychiatric/Behavioral: Negative for dysphoric mood  The patient is not nervous/anxious          Past Medical History     Past Medical History:   Diagnosis Date    Class 2 severe obesity due to excess calories with serious comorbidity and body mass index (BMI) of 39 0 to 39 9 in adult [facility]) 3/5/2019    Diabetes 1 5, managed as type 2 ([place] [address] ) 3/5/2019    Essential hypertension 3/5/2019    Morbid obesity with BMI of 40 0-44 9, adult ([place] [address] ) 3/5/2019       Past Surgical History     Past Surgical History:   Procedure Laterality Date    FRACTURE SURGERY      left ankle, plates and screws        Social History     Social History     Socioeconomic History    Marital status:      Spouse name: None    Number of children: None    Years of education: None    Highest education level: None   Occupational History    None   Tobacco Use    Smoking status: Former Smoker     Types: Cigarettes     Quit date: 2004     Years since quittin 5    Smokeless tobacco: Former User     Quit date: 2/26/1999   Vaping Use    Vaping Use: Never used   Substance and Sexual Activity    Alcohol use: No    Drug use: No    Sexual activity: Yes     Partners: Female     Birth control/protection: None   Other Topics Concern    None   Social History Narrative    None     Social Determinants of Health     Financial Resource Strain: Not on file   Food Insecurity: Not on file   Transportation Needs: Not on file   Physical Activity: Not on file   Stress: Not on file   Social Connections: Not on file   Intimate Partner Violence: Not on file   Housing Stability: Not on file       Family History     Family History   Problem Relation Age of Onset    Heart attack Maternal Grandmother     Diabetes Maternal Grandfather     Diabetes Paternal Grandmother     Mental illness Neg Hx     Substance Abuse Neg Hx        Current Medications       Current Outpatient Medications:     atorvastatin (LIPITOR) 20 mg tablet, Take 1 tablet (20 mg total) by mouth daily, Disp: 90 tablet, Rfl: 1    ergocalciferol (VITAMIN D2) 50,000 units, Take 1 capsule (50,000 Units total) by mouth once a week, Disp: 12 capsule, Rfl: 1    fenofibrate (TRICOR) 54 MG tablet, Take 1 tablet (54 mg total) by mouth daily, Disp: 90 tablet, Rfl: 1    lisinopril (ZESTRIL) 10 mg tablet, TAKE 1 TABLET (10 MG TOTAL) BY MOUTH DAILY, Disp: 90 tablet, Rfl: 0    meloxicam (Mobic) 15 mg tablet, Take 1 tablet (15 mg total) by mouth daily, Disp: 30 tablet, Rfl: 0    metFORMIN (GLUCOPHAGE) 500 mg tablet, TAKE TWO TABLETS (1,000 MG TOTAL) BY MOUTH TWO (TWO) TIMES A DAY WITH MEALS, Disp: 360 tablet, Rfl: 0    methocarbamol (ROBAXIN) 500 mg tablet, Take 1 tablet (500 mg total) by mouth 2 (two) times a day as needed for muscle spasms (Patient taking differently: Take 500 mg by mouth 2 (two) times a day as needed for muscle spasms Patient taking once aday), Disp: 60 tablet, Rfl: 0     Allergies     Allergies   Allergen Reactions    No Known Allergies        Objective Recent Results (from the past 672 hour(s))   CBC and differential    Collection Time: 08/25/22  2:33 PM   Result Value Ref Range    White Blood Cell Count 7 7 3 4 - 10 8 x10E3/uL    Red Blood Cell Count 4 74 4 14 - 5 80 x10E6/uL    Hemoglobin 14 6 13 0 - 17 7 g/dL    HCT 42 4 37 5 - 51 0 %    MCV 90 79 - 97 fL    MCH 30 8 26 6 - 33 0 pg    MCHC 34 4 31 5 - 35 7 g/dL    RDW 12 9 11 6 - 15 4 %    Platelet Count 752 753 - 450 x10E3/uL    Neutrophils 56 Not Estab  %    Lymphocytes 34 Not Estab  %    Monocytes 6 Not Estab  %    Eosinophils 3 Not Estab  %    Basophils PCT 1 Not Estab  %    Neutrophils (Absolute) 4 3 1 4 - 7 0 x10E3/uL    Lymphocytes (Absolute) 2 6 0 7 - 3 1 x10E3/uL    Monocytes (Absolute) 0 5 0 1 - 0 9 x10E3/uL    Eosinophils (Absolute) 0 2 0 0 - 0 4 x10E3/uL    Basophils ABS 0 0 0 0 - 0 2 x10E3/uL    Immature Granulocytes 0 Not Estab  %    Immature Granulocytes (Absolute) 0 0 0 0 - 0 1 x10E3/uL   Comprehensive metabolic panel    Collection Time: 08/25/22  2:33 PM   Result Value Ref Range    Glucose, Random 191 (H) 65 - 99 mg/dL    BUN 16 6 - 24 mg/dL    Creatinine 1 09 0 76 - 1 27 mg/dL    eGFR 83 >59 mL/min/1 73    SL AMB BUN/CREATININE RATIO 15 9 - 20    Sodium 141 134 - 144 mmol/L    Potassium 4 2 3 5 - 5 2 mmol/L    Chloride 103 96 - 106 mmol/L    CO2 24 20 - 29 mmol/L    CALCIUM 10 3 (H) 8 7 - 10 2 mg/dL    Protein, Total 7 4 6 0 - 8 5 g/dL    Albumin 4 6 4 0 - 5 0 g/dL    Globulin, Total 2 8 1 5 - 4 5 g/dL    Albumin/Globulin Ratio 1 6 1 2 - 2 2    TOTAL BILIRUBIN 0 5 0 0 - 1 2 mg/dL    Alk Phos Isoenzymes 49 44 - 121 IU/L    AST 45 (H) 0 - 40 IU/L    ALT 81 (H) 0 - 44 IU/L   Hemoglobin A1C    Collection Time: 08/25/22  2:33 PM   Result Value Ref Range    Hemoglobin A1C 7 4 (H) 4 8 - 5 6 %    Estimated Average Glucose 166 mg/dL   Lipid panel    Collection Time: 08/25/22  2:33 PM   Result Value Ref Range    Cholesterol, Total 150 100 - 199 mg/dL    Triglycerides 196 (H) 0 - 149 mg/dL    HDL 23 (L) >39 mg/dL    VLDL Cholesterol Calculated 34 5 - 40 mg/dL    LDL Calculated 93 0 - 99 mg/dL    T  Chol/HDL Ratio 6 5 (H) 0 0 - 5 0 ratio   Vitamin D 25 hydroxy    Collection Time: 08/25/22  2:33 PM   Result Value Ref Range    25-HYDROXY VIT D 49 1 30 0 - 100 0 ng/mL   Hepatitis C antibody    Collection Time: 08/25/22  2:33 PM   Result Value Ref Range    HEP C AB <0 1 0 0 - 0 9 s/co ratio     Reviewed lab/diagnostic results with pt including both normal and abnormal findings  In depth counseling and instructions given  All questions answered during visit  /74   Pulse 94   Temp 97 8 °F (36 6 °C) (Temporal)   Ht 5' 11" (1 803 m)   Wt 119 kg (263 lb)   SpO2 98%   BMI 36 68 kg/m²      Physical Exam  Vitals reviewed  Constitutional:       General: He is not in acute distress  Appearance: He is well-developed  He is obese  He is not ill-appearing  HENT:      Head: Normocephalic and atraumatic  Right Ear: Tympanic membrane and ear canal normal       Left Ear: Tympanic membrane and ear canal normal       Nose: Nose normal  No congestion  Mouth/Throat:      Mouth: Mucous membranes are moist       Pharynx: Oropharynx is clear  No oropharyngeal exudate  Eyes:      General: No scleral icterus  Extraocular Movements: Extraocular movements intact  Pupils: Pupils are equal, round, and reactive to light  Neck:      Thyroid: No thyromegaly  Vascular: No carotid bruit  Cardiovascular:      Rate and Rhythm: Normal rate and regular rhythm  Pulses: no weak pulses          Dorsalis pedis pulses are 2+ on the right side and 2+ on the left side  Posterior tibial pulses are 2+ on the right side and 2+ on the left side  Heart sounds: No murmur heard  Pulmonary:      Effort: Pulmonary effort is normal  No respiratory distress  Breath sounds: Normal breath sounds  No wheezing or rales  Abdominal:      General: Bowel sounds are normal  There is no distension  Palpations: Abdomen is soft  Tenderness: There is no abdominal tenderness  Musculoskeletal:         General: Normal range of motion  Cervical back: Normal range of motion and neck supple  Right lower leg: No edema  Left lower leg: No edema  Feet:      Right foot:      Skin integrity: No ulcer, skin breakdown, erythema, warmth, callus or dry skin  Left foot:      Skin integrity: No ulcer, skin breakdown, erythema, warmth, callus or dry skin  Lymphadenopathy:      Cervical: No cervical adenopathy  Skin:     General: Skin is warm and dry  Coloration: Skin is not jaundiced or pale  Neurological:      General: No focal deficit present  Mental Status: He is alert and oriented to person, place, and time  Cranial Nerves: No cranial nerve deficit  Sensory: No sensory deficit  Psychiatric:         Mood and Affect: Mood normal          Behavior: Behavior normal          Thought Content: Thought content normal          Judgment: Judgment normal          Patient's shoes and socks removed  Right Foot/Ankle   Right Foot Inspection  Skin Exam: skin normal and skin intact  No dry skin, no warmth, no callus, no erythema, no maceration, no abnormal color, no pre-ulcer, no ulcer and no callus  Toe Exam: ROM and strength within normal limits  Sensory   Vibration: intact  Proprioception: intact  Monofilament testing: intact    Vascular  Capillary refills: < 3 seconds  The right DP pulse is 2+  The right PT pulse is 2+  Left Foot/Ankle  Left Foot Inspection  Skin Exam: skin normal and skin intact  No dry skin, no warmth, no erythema, no maceration, normal color, no pre-ulcer, no ulcer and no callus  Toe Exam: ROM and strength within normal limits  Sensory   Vibration: intact  Proprioception: intact  Monofilament testing: intact    Vascular  Capillary refills: < 3 seconds  The left DP pulse is 2+  The left PT pulse is 2+       Assign Risk Category  No deformity present  No loss of protective sensation  No weak pulses  Risk: 0          Bhupendra Ferguson, [address] [facility]

## 2023-02-01 DIAGNOSIS — M62.830 MUSCLE SPASM OF BACK: ICD-10-CM

## 2023-02-01 DIAGNOSIS — E78.2 MIXED HYPERLIPIDEMIA: ICD-10-CM

## 2023-02-01 DIAGNOSIS — M54.2 NECK PAIN: ICD-10-CM

## 2023-02-01 DIAGNOSIS — G89.29 CHRONIC BILATERAL THORACIC BACK PAIN: ICD-10-CM

## 2023-02-01 DIAGNOSIS — M54.6 CHRONIC BILATERAL THORACIC BACK PAIN: ICD-10-CM

## 2023-02-01 RX ORDER — METHOCARBAMOL 500 MG/1
500 TABLET, FILM COATED ORAL 2 TIMES DAILY PRN
Qty: 120 TABLET | Refills: 1 | Status: SHIPPED | OUTPATIENT
Start: 2023-02-01

## 2023-02-01 RX ORDER — ATORVASTATIN CALCIUM 20 MG/1
20 TABLET, FILM COATED ORAL DAILY
Qty: 90 TABLET | Refills: 1 | Status: SHIPPED | OUTPATIENT
Start: 2023-02-01

## 2023-02-01 RX ORDER — MELOXICAM 15 MG/1
15 TABLET ORAL DAILY
Qty: 90 TABLET | Refills: 1 | Status: SHIPPED | OUTPATIENT
Start: 2023-02-01

## 2023-02-06 LAB
LEFT EYE DIABETIC RETINOPATHY: NORMAL
RIGHT EYE DIABETIC RETINOPATHY: NORMAL

## 2023-03-03 ENCOUNTER — OFFICE VISIT (OUTPATIENT)
Dept: FAMILY MEDICINE CLINIC | Facility: CLINIC | Age: 53
End: 2023-03-03

## 2023-03-03 VITALS
HEIGHT: 71 IN | SYSTOLIC BLOOD PRESSURE: 136 MMHG | WEIGHT: 265 LBS | DIASTOLIC BLOOD PRESSURE: 84 MMHG | HEART RATE: 97 BPM | OXYGEN SATURATION: 99 % | TEMPERATURE: 97.8 F | RESPIRATION RATE: 18 BRPM | BODY MASS INDEX: 37.1 KG/M2

## 2023-03-03 DIAGNOSIS — E55.9 VITAMIN D DEFICIENCY: ICD-10-CM

## 2023-03-03 DIAGNOSIS — Z12.11 SCREENING FOR MALIGNANT NEOPLASM OF COLON: ICD-10-CM

## 2023-03-03 DIAGNOSIS — E11.9 TYPE 2 DIABETES MELLITUS WITHOUT COMPLICATION, WITHOUT LONG-TERM CURRENT USE OF INSULIN (HCC): ICD-10-CM

## 2023-03-03 DIAGNOSIS — E78.2 MIXED HYPERLIPIDEMIA: ICD-10-CM

## 2023-03-03 DIAGNOSIS — I10 ESSENTIAL HYPERTENSION: Primary | ICD-10-CM

## 2023-03-03 DIAGNOSIS — E78.1 HYPERTRIGLYCERIDEMIA: ICD-10-CM

## 2023-03-03 DIAGNOSIS — G47.33 OSA (OBSTRUCTIVE SLEEP APNEA): ICD-10-CM

## 2023-03-03 LAB — SL AMB POCT HEMOGLOBIN AIC: 7.3 (ref ?–6.5)

## 2023-03-03 RX ORDER — FENOFIBRATE 54 MG/1
54 TABLET ORAL DAILY
Qty: 90 TABLET | Refills: 1 | Status: SHIPPED | OUTPATIENT
Start: 2023-03-03

## 2023-03-03 RX ORDER — LISINOPRIL 10 MG/1
10 TABLET ORAL DAILY
Qty: 90 TABLET | Refills: 1 | Status: SHIPPED | OUTPATIENT
Start: 2023-03-03

## 2023-03-03 NOTE — PROGRESS NOTES
Name: Christiano Castillo      : 1970      MRN: [id number]  Encounter Provider: PHYLLIS Gee  Encounter Date: 3/3/2023   Encounter department: [address] [place] [address]  Type 2 diabetes mellitus without complication, without long-term current use of insulin (HCC)  Carb controlled diet  Metformin 1000mg daily  Regular exercise  - POCT hemoglobin A1c  - Microalbumin / creatinine urine ratio  - Hemoglobin A1C; Future  - Comprehensive metabolic panel; Future  - CBC and Platelet; Future  - Lipid Panel with Direct LDL reflex; Future  - metFORMIN (GLUCOPHAGE) 500 mg tablet; Take 2 tablets (1,000 mg total) by mouth daily with breakfast  Dispense: 180 tablet; Refill: 1    2  Essential hypertension  Stable  Lisinopril  Limit salt  Monitor bp  - Comprehensive metabolic panel; Future  - CBC and Platelet; Future  - - lisinopril (ZESTRIL) 10 mg tablet; Take 1 tablet (10 mg total) by mouth daily  Dispense: 90 tablet; Refill: 1    3  Mixed hyperlipidemia  Heart healthy diet  Statin  Fibrate  Regular exercise  Check labs in 6 months  - Comprehensive metabolic panel; Future  - CBC and Platelet; Future  - Lipid Panel with Direct LDL reflex; Future  - - fenofibrate (TRICOR) 54 MG tablet; Take 1 tablet (54 mg total) by mouth daily  Dispense: 90 tablet; Refill: 1    4  Vitamin D deficiency  - Comprehensive metabolic panel; Future  - CBC and Platelet; Future  - Comprehensive metabolic panel  -  5  Screening for malignant neoplasm of colon  - Cologuard    6  Hypertriglyceridemia  - fenofibrate (TRICOR) 54 MG tablet; Take 1 tablet (54 mg total) by mouth daily  Dispense: 90 tablet; Refill: 1    7  BR[place] (obstructive sleep apnea)  Counseled on regular use of cpap  Pt agreeable  Patient was counseled regarding instructions for management which included: impression/diagnosis, risk/benefits of treatment options, importance of compliance with treatment, risk factor reduction, and prognosis     I have reviewed the instructions with the patient answering all questions and patient verbalized understanding  Subjective      Here for DM and chronic fu  On metformin for DM  Admits to not really watching diet  Does not check blood sugars  Taking all meds as prescribed except does not take second dose of metformin  Always forgets  Takes all meds in am  No recent illness  No specific concerns or issues  Has been fatigued lately  Has sleep apnea but has not been using cpap regularly  Review of Systems   Constitutional: Positive for fatigue  Negative for unexpected weight change  Eyes:        Wears glasses   Respiratory: Negative for chest tightness and shortness of breath  Cardiovascular: Negative for chest pain, palpitations and leg swelling  Gastrointestinal: Negative for abdominal pain  Musculoskeletal: Negative for arthralgias and myalgias  Neurological: Negative for dizziness and headaches  Psychiatric/Behavioral: Negative for dysphoric mood  The patient is not nervous/anxious  Current Outpatient Medications on File Prior to Visit   Medication Sig   • atorvastatin (LIPITOR) 20 mg tablet Take 1 tablet (20 mg total) by mouth daily   • fenofibrate (TRICOR) 54 MG tablet Take 1 tablet (54 mg total) by mouth daily   • lisinopril (ZESTRIL) 10 mg tablet Take 1 tablet (10 mg total) by mouth daily   • meloxicam (Mobic) 15 mg tablet Take 1 tablet (15 mg total) by mouth daily   • metFORMIN (GLUCOPHAGE) 500 mg tablet Take 2 tablets (1,000 mg total) by mouth 2 (two) times a day with meals   • methocarbamol (ROBAXIN) 500 mg tablet Take 1 tablet (500 mg total) by mouth 2 (two) times a day as needed for muscle spasms       Objective   poct A1C 7 3    /84   Pulse 97   Temp 97 8 °F (36 6 °C) (Temporal)   Resp 18   Ht 5' 11" (1 803 m)   Wt 120 kg (265 lb)   SpO2 99%   BMI 36 96 kg/m²     Physical Exam  Vitals reviewed  Constitutional:       General: He is not in acute distress  Appearance: He is not ill-appearing  Neck:      Vascular: No carotid bruit  Cardiovascular:      Rate and Rhythm: Normal rate and regular rhythm  Pulmonary:      Effort: Pulmonary effort is normal  No respiratory distress  Breath sounds: Normal breath sounds  No wheezing or rales  Abdominal:      General: There is no distension  Palpations: Abdomen is soft  Musculoskeletal:      Cervical back: Normal range of motion  Right lower leg: No edema  Left lower leg: No edema  Skin:     General: Skin is warm and dry  Coloration: Skin is not jaundiced or pale  Neurological:      General: No focal deficit present  Mental Status: He is alert and oriented to person, place, and time  Cranial Nerves: No cranial nerve deficit  Sensory: No sensory deficit  Psychiatric:         Mood and Affect: Mood normal          Behavior: Behavior normal          Thought Content:  Thought content normal          Judgment: Judgment normal        Mohan Ramp

## 2023-03-03 NOTE — PATIENT INSTRUCTIONS
Heart healthy, carbohydrate controlled diet- limit red meat, limit saturated fat, moderate salt intake, limit junk food, etc    Regular exercise  Stress management  Routine labwork and screenings as ordered  Continue same medications except will change dose of metformin to 1000mg in am    Will check labs in 6 months  Use CPAP regularly  Cologuard for colon cancer screening

## 2023-03-04 LAB
ALBUMIN/CREAT UR: 4 MG/G CREAT (ref 0–29)
CREAT UR-MCNC: 123.4 MG/DL
MICROALBUMIN UR-MCNC: 4.9 UG/ML

## 2023-03-07 ENCOUNTER — TELEPHONE (OUTPATIENT)
Dept: FAMILY MEDICINE CLINIC | Facility: CLINIC | Age: 53
End: 2023-03-07

## 2023-03-07 NOTE — TELEPHONE ENCOUNTER
----- Message from Juanito Montano sent at 3/7/2023 12:08 PM EST -----  Regarding: FW: CPAP Rx   Contact: [phone]  Please follow up with pt  I did not order his cpap  Who ordered it? It is usually sleep medicine or pulmonary? I do not know his settings, etc    He may need to contact the DME supplier and/or whoever ordered      ----- Message -----  From: Mindi Doyle LPN  Sent: 8/8/5301  11:37 AM EST  To: PHYLLIS Montano  Subject: FW: CPAP Rx                                        ----- Message -----  From: Elvis [place]  Sent: 3/7/2023  11:21 AM EST  To: [facility]  Subject: CPAP Rx                                          Good Morning, I am looking for my CPAP RX and can not seem to find it   I need it to order mask and such    Thank you Agueda Dalton

## 2023-03-07 NOTE — TELEPHONE ENCOUNTER
Spoke to patient he will contact sleep study [facility]  There is a old order from 2019  that was in his chart he will stop by the office to pick the order up

## 2023-06-12 ENCOUNTER — OFFICE VISIT (OUTPATIENT)
Dept: FAMILY MEDICINE CLINIC | Facility: CLINIC | Age: 53
End: 2023-06-12
Payer: COMMERCIAL

## 2023-06-12 VITALS
SYSTOLIC BLOOD PRESSURE: 120 MMHG | WEIGHT: 265 LBS | OXYGEN SATURATION: 96 % | TEMPERATURE: 98.5 F | DIASTOLIC BLOOD PRESSURE: 88 MMHG | RESPIRATION RATE: 16 BRPM | BODY MASS INDEX: 37.1 KG/M2 | HEIGHT: 71 IN | HEART RATE: 120 BPM

## 2023-06-12 DIAGNOSIS — R06.02 SOB (SHORTNESS OF BREATH): ICD-10-CM

## 2023-06-12 DIAGNOSIS — G47.33 OSA (OBSTRUCTIVE SLEEP APNEA): ICD-10-CM

## 2023-06-12 DIAGNOSIS — M79.10 MYALGIA: ICD-10-CM

## 2023-06-12 DIAGNOSIS — R51.9 INTRACTABLE HEADACHE, UNSPECIFIED CHRONICITY PATTERN, UNSPECIFIED HEADACHE TYPE: ICD-10-CM

## 2023-06-12 DIAGNOSIS — R06.09 DOE (DYSPNEA ON EXERTION): ICD-10-CM

## 2023-06-12 DIAGNOSIS — R00.0 TACHYCARDIA: ICD-10-CM

## 2023-06-12 DIAGNOSIS — R53.83 OTHER FATIGUE: Primary | ICD-10-CM

## 2023-06-12 PROCEDURE — 93000 ELECTROCARDIOGRAM COMPLETE: CPT | Performed by: NURSE PRACTITIONER

## 2023-06-12 PROCEDURE — 36415 COLL VENOUS BLD VENIPUNCTURE: CPT | Performed by: NURSE PRACTITIONER

## 2023-06-12 PROCEDURE — 99214 OFFICE O/P EST MOD 30 MIN: CPT | Performed by: NURSE PRACTITIONER

## 2023-06-12 NOTE — PROGRESS NOTES
Name: Hien Barrett      : 1970      MRN: [id number]  Encounter Provider: PHYLLIS Vilchis  Encounter Date: 2023   Encounter department: [address] [place] [address]  Other fatigue  May be multifactorial  Has not been using CPAP, encouraged to resume  Will check labs  - CBC and differential  - Comprehensive metabolic panel; Future  - TSH, 3rd generation  - Lyme Disease Serology w/Reflex  - C-reactive protein  - Comprehensive metabolic panel    2  Myalgia  - CBC and differential  - Comprehensive metabolic panel; Future  - Lyme Disease Serology w/Reflex  - C-reactive protein  - CK  - Comprehensive metabolic panel    3  Intractable headache, unspecified chronicity pattern, unspecified headache type  - CBC and differential  - Comprehensive metabolic panel; Future  - Lyme Disease Serology w/Reflex  - C-reactive protein  - Comprehensive metabolic panel    4  SOB (shortness of breath)  EKG with ST, no ectopy  Concern regarding possible CV source of sob  Pt with multiple risk factors including age, weight, DM, lipids, sleep apnea, htn, etc    Will check cxr  Refer to cardio for full eval  - XR chest pa & lateral; Future  - POCT ECG  - Ambulatory Referral to Cardiology; Future    5  CODY (dyspnea on exertion)  EKG with ST, no ectopy  Concern regarding possible CV source of sob  Pt with multiple risk factors including age, weight, DM, lipids, sleep apnea, htn, etc    Will check cxr  Refer to cardio for full eval  - POCT ECG  - Ambulatory Referral to Cardiology; Future    6  [place] (obstructive sleep apnea)  Resume use of cpap  - Ambulatory Referral to Cardiology; Future    7  Tachycardia  - POCT ECG  - Ambulatory Referral to Cardiology; Future           Subjective      Here for sick visit  Symptoms for about one month  No fever    (+) body aches, headache, fatigue  Generally not feeling well  No sick contacts  No n/v/d  Has dx sleep apnea  Has not been using CPAP  "States needs new equipment  Muscles feel very tired/sore  Sob going on for \"a long time\", exertional  Denies chest pain  Has DM, does not check blood sugars  Muscle Pain  Associated symptoms include fatigue and headaches  Pertinent negatives include no abdominal pain, chest pain, constipation, diarrhea, dysuria, nausea, rash, shortness of breath or vomiting  Fatigue  Associated symptoms include fatigue, headaches and myalgias  Pertinent negatives include no abdominal pain, chest pain, nausea, rash or vomiting  Headache    Review of Systems   Constitutional: Positive for fatigue  Respiratory: Negative for chest tightness and shortness of breath  Cardiovascular: Negative for chest pain and palpitations  Gastrointestinal: Negative for abdominal pain, constipation, diarrhea, nausea and vomiting  Genitourinary: Negative for dysuria  Musculoskeletal: Positive for myalgias  Skin: Negative for rash  Allergic/Immunologic: Negative for immunocompromised state  Neurological: Positive for headaches         Current Outpatient Medications on File Prior to Visit   Medication Sig   • atorvastatin (LIPITOR) 20 mg tablet Take 1 tablet (20 mg total) by mouth daily   • fenofibrate (TRICOR) 54 MG tablet Take 1 tablet (54 mg total) by mouth daily   • lisinopril (ZESTRIL) 10 mg tablet Take 1 tablet (10 mg total) by mouth daily   • meloxicam (Mobic) 15 mg tablet Take 1 tablet (15 mg total) by mouth daily (Patient taking differently: Take 15 mg by mouth if needed)   • metFORMIN (GLUCOPHAGE) 500 mg tablet Take 2 tablets (1,000 mg total) by mouth daily with breakfast   • methocarbamol (ROBAXIN) 500 mg tablet Take 1 tablet (500 mg total) by mouth 2 (two) times a day as needed for muscle spasms       Objective   poct EKG- ST, no ectopy, no ST elevation    /88   Pulse (!) 120   Temp 98 5 °F (36 9 °C) (Temporal)   Resp 16   Ht 5' 11\" (1 803 m)   Wt 120 kg (265 lb)   SpO2 96%   BMI 36 96 kg/m²     Physical " Exam  Vitals reviewed  Constitutional:       General: He is not in acute distress  Appearance: He is not ill-appearing  Eyes:      General: No scleral icterus  Neck:      Vascular: No carotid bruit  Cardiovascular:      Rate and Rhythm: Regular rhythm  Tachycardia present  Pulses: no weak pulses          Dorsalis pedis pulses are 2+ on the right side and 2+ on the left side  Posterior tibial pulses are 2+ on the right side and 2+ on the left side  Pulmonary:      Effort: Pulmonary effort is normal  No respiratory distress  Breath sounds: Normal breath sounds  No wheezing or rales  Abdominal:      General: There is no distension  Palpations: Abdomen is soft  Musculoskeletal:      Cervical back: Normal range of motion  Right lower leg: No edema  Left lower leg: No edema  Feet:      Right foot:      Skin integrity: No ulcer, skin breakdown, erythema, warmth, callus or dry skin  Left foot:      Skin integrity: No ulcer, skin breakdown, erythema, warmth, callus or dry skin  Skin:     General: Skin is warm and dry  Coloration: Skin is not jaundiced or pale  Neurological:      General: No focal deficit present  Mental Status: He is alert and oriented to person, place, and time  Cranial Nerves: No cranial nerve deficit  Sensory: No sensory deficit  Psychiatric:         Mood and Affect: Mood normal          Behavior: Behavior normal          Thought Content: Thought content normal          Judgment: Judgment normal      Patient's shoes and socks removed  Right Foot/Ankle   Right Foot Inspection  Skin Exam: skin normal and skin intact  No dry skin, no warmth, no callus, no erythema, no maceration, no abnormal color, no pre-ulcer, no ulcer and no callus  Toe Exam: ROM and strength within normal limits  Sensory   Monofilament testing: intact    Vascular  Capillary refills: < 3 seconds  The right DP pulse is 2+  The right PT pulse is 2+  Left Foot/Ankle  Left Foot Inspection  Skin Exam: skin normal and skin intact  No dry skin, no warmth, no erythema, no maceration, normal color, no pre-ulcer, no ulcer and no callus  Toe Exam: ROM and strength within normal limits  Sensory   Monofilament testing: intact    Vascular  Capillary refills: < 3 seconds  The left DP pulse is 2+  The left PT pulse is 2+       Assign Risk Category  No deformity present  No loss of protective sensation  No weak pulses  Risk: Dusty 172, CRNP

## 2023-06-12 NOTE — PATIENT INSTRUCTIONS
EKG done in office today showed only a mildly elevated heart rate and no other concerning findings  Chest xray to be done today  Schedule appt with cardiology as discussed  Will check labs  It is imperative that you resume use of CPAP for sleep apnea  Continue all current medications  Call or return to office if symptoms worsen or if new symptoms develop

## 2023-06-16 LAB
B BURGDOR IGG+IGM SER QL IA: NEGATIVE
BASOPHILS # BLD AUTO: 0 X10E3/UL (ref 0–0.2)
BASOPHILS NFR BLD AUTO: 0 %
CK SERPL-CCNC: 116 U/L (ref 41–331)
CRP SERPL-MCNC: 20 MG/L (ref 0–10)
EOSINOPHIL # BLD AUTO: 0.1 X10E3/UL (ref 0–0.4)
EOSINOPHIL NFR BLD AUTO: 1 %
ERYTHROCYTE [DISTWIDTH] IN BLOOD BY AUTOMATED COUNT: 12.9 % (ref 11.6–15.4)
HCT VFR BLD AUTO: 45.8 % (ref 37.5–51)
HGB BLD-MCNC: 16.1 G/DL (ref 13–17.7)
IMM GRANULOCYTES # BLD: 0 X10E3/UL (ref 0–0.1)
IMM GRANULOCYTES NFR BLD: 0 %
LYMPHOCYTES # BLD AUTO: 0.8 X10E3/UL (ref 0.7–3.1)
LYMPHOCYTES NFR BLD AUTO: 9 %
MCH RBC QN AUTO: 29.8 PG (ref 26.6–33)
MCHC RBC AUTO-ENTMCNC: 35.2 G/DL (ref 31.5–35.7)
MCV RBC AUTO: 85 FL (ref 79–97)
MONOCYTES # BLD AUTO: 0.3 X10E3/UL (ref 0.1–0.9)
MONOCYTES NFR BLD AUTO: 4 %
NEUTROPHILS # BLD AUTO: 7 X10E3/UL (ref 1.4–7)
NEUTROPHILS NFR BLD AUTO: 86 %
PLATELET # BLD AUTO: 249 X10E3/UL (ref 150–450)
RBC # BLD AUTO: 5.41 X10E6/UL (ref 4.14–5.8)
TSH SERPL DL<=0.005 MIU/L-ACNC: 0.85 UIU/ML (ref 0.45–4.5)
WBC # BLD AUTO: 8.3 X10E3/UL (ref 3.4–10.8)

## 2023-06-20 DIAGNOSIS — R53.83 OTHER FATIGUE: Primary | ICD-10-CM

## 2023-06-20 DIAGNOSIS — M79.10 MYALGIA: ICD-10-CM

## 2023-06-20 DIAGNOSIS — R51.9 ACUTE INTRACTABLE HEADACHE, UNSPECIFIED HEADACHE TYPE: ICD-10-CM

## 2023-07-05 NOTE — PROGRESS NOTES
Consultation - Cardiology Office  [facility]. Melo Zazueta 47 y.o. male MRN: [id number]  : 1970  Unit/Bed#:  Encounter: [id number]      ASSESSMENT:  Fatigue,  negative Lyme serology  Paroxysmal tachycardia, normal TSH  Dyspnea on exertion    Mixed hyperlipidemia  2022: LDL 93, , HDL 23, AST 45/40, ALT 81/44  On fenofibrate 54 mg and atorvastatin 20 mg    Essential hypertension  On lisinopril 10 mg    Diabetes mellitus type 2  On metformin    Obesity, BMI 36.96    [place], has not been using CPAP          RECOMMENDATIONS:  48-hour Holter monitor  Echocardiogram  Exercise stress test  Repeat lipid profile and liver enzymes PCP    Depending on above test results, will consider adding or switching to a beta-blocker        Thank you for your consultation. If you have any question please call me at [address]      Primary Care Physician Requesting Consult: Akua Johnson, [facility]      Reason for Consult / Principal Problem: Tachycardia        HPI :     Melo Zazueta is a 47y.o. year old male who was referred by primary care doctor for evaluation of fast heart rate, dyspnea on exertion and easy fatigability. Patient has obesity, sleep apnea not currently using CPAP, diabetes mellitus, hypertension and mixed hyperlipidemia. According to him he has been having fast heart rate and dyspnea on exertion. According to the patient he is working on his feet all day. 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 His Lyme serology was negative, and his TSH is normal      Review of Systems   Constitutional: Positive for fatigue. Respiratory: Positive for shortness of breath. Cardiovascular: Positive for palpitations. All other systems reviewed and are negative.       Historical Information   Past Medical History:   Diagnosis Date   • Class 2 severe obesity due to excess calories with serious comorbidity and body mass index (BMI) of 39.0 to 39.9 in adult [facility]) 3/5/2019   • Diabetes 1.5, managed as type 2 ([facility]) 3/5/2019   • Essential hypertension 3/5/2019   • Morbid obesity with BMI of 40.0-44.9, adult ([facility]) 3/5/2019     Past Surgical History:   Procedure Laterality Date   • FRACTURE SURGERY      left ankle, plates and screws      Social History     Substance and Sexual Activity   Alcohol Use No     Social History     Substance and Sexual Activity   Drug Use No     Social History     Tobacco Use   Smoking Status Former   • Types: Cigarettes   • Quit date: 2004   • Years since quittin.3   Smokeless Tobacco Former   • Quit date: 1999     Family History:   Family History   Problem Relation Age of Onset   • Heart attack Maternal Grandmother    • Diabetes Maternal Grandfather    • Diabetes Paternal Grandmother    • Mental illness Neg Hx    • Substance Abuse Neg Hx        Meds/Allergies     Allergies   Allergen Reactions   • Pollen Extract Nasal Congestion     Itchy eyes       Current Outpatient Medications:   •  atorvastatin (LIPITOR) 20 mg tablet, Take 1 tablet (20 mg total) by mouth daily, Disp: 90 tablet, Rfl: 1  •  fenofibrate (TRICOR) 54 MG tablet, Take 1 tablet (54 mg total) by mouth daily, Disp: 90 tablet, Rfl: 1  •  lisinopril (ZESTRIL) 10 mg tablet, Take 1 tablet (10 mg total) by mouth daily, Disp: 90 tablet, Rfl: 1  •  meloxicam (Mobic) 15 mg tablet, Take 1 tablet (15 mg total) by mouth daily (Patient taking differently: Take 15 mg by mouth if needed), Disp: 90 tablet, Rfl: 1  •  metFORMIN (GLUCOPHAGE) 500 mg tablet, Take 2 tablets (1,000 mg total) by mouth daily with breakfast, Disp: 180 tablet, Rfl: 1  •  methocarbamol (ROBAXIN) 500 mg tablet, Take 1 tablet (500 mg total) by mouth 2 (two) times a day as needed for muscle spasms, Disp: 120 tablet, Rfl: 1    Vitals: Blood pressure 138/92, pulse 94, height 5' 11" (1.803 m), weight 116 kg (256 lb), SpO2 97 %. ?  Body mass index is 35.7 kg/m².   Vitals:    23 1539   Weight: 116 kg (256 lb)     BP Readings from Last 3 Encounters:   07/06/23 138/92   06/12/23 120/88   03/03/23 136/84       Physical Exam  PHYSICAL EXAMINATION:  Neurologic:  Alert & oriented x 3, no new focal deficits, Not in any acute distress,  Constitutional: Obese  Eyes:  Pupil equal and reacting to light, conjunctiva normal, No JVP, No LNP   HENT:  Atraumatic, oropharynx moist, Neck- normal range of motion, no tenderness,  Neck supple   Respiratory:  Bilateral air entry, mostly clear to auscultation  Cardiovascular: S1-S2 regular with a I/VI systolic murmur   GI:  Soft, nondistended, normal bowel sounds, nontender, no hepatosplenomegaly appreciated. Musculoskeletal: no tenderness, no deformities. Skin:  Well hydrated, no rash   Lymphatic:  No lymphadenopathy noted   Extremities:  No edema and distal pulses are present    Diagnostic Studies Review Cardio:      EKG: Normal sinus rhythm, heart rate 92/min    Cardiac testing:   No results found for this or any previous visit. Imaging:  Chest X-Ray:   No Chest XR results available for this patient. CT-scan of the chest:     No CTA results available for this patient.   Lab Review   Lab Results   Component Value Date    WBC 8.3 06/12/2023    HGB 16.1 06/12/2023    HCT 45.8 06/12/2023    MCV 85 06/12/2023    RDW 12.9 06/12/2023     06/12/2023     BMP:  Lab Results   Component Value Date    SODIUM 141 08/25/2022    K 4.2 08/25/2022     08/25/2022    CO2 24 08/25/2022    BUN 16 08/25/2022    CREATININE 1.09 08/25/2022    GLUC 191 (H) 08/25/2022    EGFR 83 08/25/2022     LFT:  Lab Results   Component Value Date    AST 45 (H) 08/25/2022    ALT 81 (H) 08/25/2022    TP 7.4 08/25/2022    ALB 4.6 08/25/2022      No results found for: "NNJ5BACKVDVH"  No components found for: "TSH3"  Lab Results   Component Value Date    HGBA1C 7.3 (A) 03/03/2023     Lipid Profile:   Lab Results   Component Value Date    CHOLESTEROL 150 08/25/2022    HDL 23 (L) 08/25/2022 LDLCALC 93 08/25/2022    TRIG 196 (H) 08/25/2022     Lab Results   Component Value Date    CHOLESTEROL 150 08/25/2022    CHOLESTEROL 205 (H) 01/21/2022     Lab Results   Component Value Date    CKTOTAL 116 06/12/2023     No results found for: "NTBNP"   Recent Results (from the past 672 hour(s))   CBC and differential    Collection Time: 06/12/23  3:00 PM   Result Value Ref Range    White Blood Cell Count 8.3 3.4 - 10.8 x10E3/uL    Red Blood Cell Count 5.41 4.14 - 5.80 x10E6/uL    Hemoglobin 16.1 13.0 - 17.7 g/dL    HCT 45.8 37.5 - 51.0 %    MCV 85 79 - 97 fL    MCH 29.8 26.6 - 33.0 pg    MCHC 35.2 31.5 - 35.7 g/dL    RDW 12.9 11.6 - 15.4 %    Platelet Count 939 872 - 450 x10E3/uL    Neutrophils 86 Not Estab. %    Lymphocytes 9 Not Estab. %    Monocytes 4 Not Estab. %    Eosinophils 1 Not Estab. %    Basophils PCT 0 Not Estab. %    Neutrophils (Absolute) 7.0 1.4 - 7.0 x10E3/uL    Lymphocytes (Absolute) 0.8 0.7 - 3.1 x10E3/uL    Monocytes (Absolute) 0.3 0.1 - 0.9 x10E3/uL    Eosinophils (Absolute) 0.1 0.0 - 0.4 x10E3/uL    Basophils ABS 0.0 0.0 - 0.2 x10E3/uL    Immature Granulocytes 0 Not Estab. %    Immature Granulocytes (Absolute) 0.0 0.0 - 0.1 x10E3/uL   TSH, 3rd generation    Collection Time: 06/12/23  3:00 PM   Result Value Ref Range    TSH 0.854 0.450 - 4.500 uIU/mL   Lyme Disease Serology w/Reflex    Collection Time: 06/12/23  3:00 PM   Result Value Ref Range    LYME TOTAL ANTIBODY EIA Negative Negative   C-reactive protein    Collection Time: 06/12/23  3:00 PM   Result Value Ref Range    C-Reactive Protein, Quant 20 (H) 0 - 10 mg/L   CK    Collection Time: 06/12/23  3:00 PM   Result Value Ref Range    Creatine Kinase, Total 116 41 - 331 U/L           Dr. Grady Echevarria MD, [facility]      "This note has been constructed using a voice recognition system. Therefore there may be syntax, spelling, and/or grammatical errors.  Please call if you have any questions. "

## 2023-07-06 ENCOUNTER — CONSULT (OUTPATIENT)
Dept: CARDIOLOGY CLINIC | Facility: CLINIC | Age: 53
End: 2023-07-06
Payer: COMMERCIAL

## 2023-07-06 VITALS
DIASTOLIC BLOOD PRESSURE: 92 MMHG | HEIGHT: 71 IN | HEART RATE: 94 BPM | OXYGEN SATURATION: 97 % | BODY MASS INDEX: 35.84 KG/M2 | WEIGHT: 256 LBS | SYSTOLIC BLOOD PRESSURE: 138 MMHG

## 2023-07-06 DIAGNOSIS — G47.33 OSA (OBSTRUCTIVE SLEEP APNEA): ICD-10-CM

## 2023-07-06 DIAGNOSIS — R00.0 TACHYCARDIA: Primary | ICD-10-CM

## 2023-07-06 DIAGNOSIS — R06.09 DOE (DYSPNEA ON EXERTION): ICD-10-CM

## 2023-07-06 DIAGNOSIS — R06.02 SOB (SHORTNESS OF BREATH): ICD-10-CM

## 2023-07-06 PROCEDURE — 99243 OFF/OP CNSLTJ NEW/EST LOW 30: CPT | Performed by: INTERNAL MEDICINE

## 2023-07-06 PROCEDURE — 93000 ELECTROCARDIOGRAM COMPLETE: CPT | Performed by: INTERNAL MEDICINE

## 2023-07-24 DIAGNOSIS — G47.33 OSA (OBSTRUCTIVE SLEEP APNEA): Primary | ICD-10-CM

## 2023-07-27 ENCOUNTER — TELEPHONE (OUTPATIENT)
Dept: SLEEP CENTER | Facility: CLINIC | Age: 53
End: 2023-07-27

## 2023-07-27 NOTE — TELEPHONE ENCOUNTER
----- Message from Dontae Hudson MD sent at 7/26/2023  7:11 PM EDT -----  Approved    ----- Message -----  From: Edmar Siddiqi  Sent: 7/26/2023   8:49 AM EDT  To: Sleep Medicine [facility] Provider    This CPAP sleep study needs approval.     If approved please sign and return to clerical pool. If denied please include reasons why. Also provide alternative testing if warranted. Please sign and return to clerical pool.

## 2023-07-31 ENCOUNTER — HOSPITAL ENCOUNTER (OUTPATIENT)
Dept: NON INVASIVE DIAGNOSTICS | Facility: HOSPITAL | Age: 53
Discharge: HOME | End: 2023-07-31
Attending: INTERNAL MEDICINE
Payer: COMMERCIAL

## 2023-07-31 DIAGNOSIS — R00.0 TACHYCARDIA: ICD-10-CM

## 2023-07-31 PROCEDURE — 93226 XTRNL ECG REC<48 HR SCAN A/R: CPT

## 2023-07-31 PROCEDURE — 93225 XTRNL ECG REC<48 HRS REC: CPT

## 2023-08-02 ENCOUNTER — HOSPITAL ENCOUNTER (OUTPATIENT)
Dept: NON INVASIVE DIAGNOSTICS | Facility: HOSPITAL | Age: 53
Discharge: HOME | End: 2023-08-02
Attending: INTERNAL MEDICINE
Payer: COMMERCIAL

## 2023-08-02 VITALS
HEIGHT: 71 IN | DIASTOLIC BLOOD PRESSURE: 103 MMHG | SYSTOLIC BLOOD PRESSURE: 144 MMHG | HEART RATE: 85 BPM | WEIGHT: 256 LBS | BODY MASS INDEX: 35.84 KG/M2

## 2023-08-02 DIAGNOSIS — R06.09 DOE (DYSPNEA ON EXERTION): ICD-10-CM

## 2023-08-02 DIAGNOSIS — R06.02 SOB (SHORTNESS OF BREATH): ICD-10-CM

## 2023-08-02 LAB
AORTIC ROOT: 4 CM
APICAL FOUR CHAMBER EJECTION FRACTION: 63 %
ASCENDING AORTA: 3.6 CM
CHEST PAIN STATEMENT: NORMAL
E WAVE DECELERATION TIME: 270 MS
FRACTIONAL SHORTENING: 33 % (ref 28–44)
INTERVENTRICULAR SEPTUM IN DIASTOLE (PARASTERNAL SHORT AXIS VIEW): 1.2 CM
INTERVENTRICULAR SEPTUM: 1.2 CM (ref 0.6–1.1)
LAAS-AP2: 23 CM2
LAAS-AP4: 18.8 CM2
LEFT ATRIUM SIZE: 4 CM
LEFT ATRIUM VOLUME (MOD BIPLANE): 64 ML
LEFT INTERNAL DIMENSION IN SYSTOLE: 3.2 CM (ref 2.1–4)
LEFT VENTRICULAR INTERNAL DIMENSION IN DIASTOLE: 4.8 CM (ref 3.5–6)
LEFT VENTRICULAR POSTERIOR WALL IN END DIASTOLE: 1.1 CM
LEFT VENTRICULAR STROKE VOLUME: 65 ML
LVSV (TEICH): 65 ML
MAX DIASTOLIC BP: 100 MMHG
MAX HEART RATE: 166 BPM
MAX PREDICTED HEART RATE: 169 BPM
MAX. SYSTOLIC BP: 200 MMHG
MV E'TISSUE VEL-LAT: 6 CM/S
MV E'TISSUE VEL-SEP: 5 CM/S
MV PEAK A VEL: 0.58 M/S
MV PEAK E VEL: 50 CM/S
MV STENOSIS PRESSURE HALF TIME: 78 MS
MV VALVE AREA P 1/2 METHOD: 2.82 CM2
PROTOCOL NAME: NORMAL
REASON FOR TERMINATION: NORMAL
RIGHT ATRIUM AREA SYSTOLE A4C: 16.2 CM2
RIGHT VENTRICLE ID DIMENSION: 3.7 CM
SINOTUBULAR JUNCTION: 3.5 CM
SL CV LEFT ATRIUM LENGTH A2C: 5.7 CM
SL CV LV EF: 55
SL CV PED ECHO LEFT VENTRICLE DIASTOLIC VOLUME (MOD BIPLANE) 2D: 106 ML
SL CV PED ECHO LEFT VENTRICLE SYSTOLIC VOLUME (MOD BIPLANE) 2D: 41 ML
SL CV SINUS OF VALSALVA 2D: 4.4 CM
STJ: 3.5 CM
TARGET HR FORMULA: NORMAL
TEST INDICATION: NORMAL
TIME IN EXERCISE PHASE: NORMAL
TR MAX PG: 21 MMHG
TR PEAK VELOCITY: 2.3 M/S
TRICUSPID ANNULAR PLANE SYSTOLIC EXCURSION: 2.1 CM
TRICUSPID VALVE PEAK REGURGITATION VELOCITY: 2.3 M/S

## 2023-08-02 PROCEDURE — 93017 CV STRESS TEST TRACING ONLY: CPT

## 2023-08-02 PROCEDURE — 93306 TTE W/DOPPLER COMPLETE: CPT | Performed by: INTERNAL MEDICINE

## 2023-08-02 PROCEDURE — 93306 TTE W/DOPPLER COMPLETE: CPT

## 2023-08-03 ENCOUNTER — TELEPHONE (OUTPATIENT)
Dept: CARDIOLOGY CLINIC | Facility: CLINIC | Age: 53
End: 2023-08-03

## 2023-08-03 DIAGNOSIS — I71.21 ANEURYSM OF ASCENDING AORTA WITHOUT RUPTURE (HCC): Primary | ICD-10-CM

## 2023-08-03 LAB
MAX HR PERCENT: 98 %
MAX HR: 166 BPM
RATE PRESSURE PRODUCT: NORMAL
SL CV STRESS RECOVERY BP: NORMAL MMHG
SL CV STRESS RECOVERY HR: 126 BPM
STRESS ANGINA INDEX: 0
STRESS BASELINE BP: NORMAL MMHG
STRESS BASELINE HR: 101 BPM
STRESS O2 SAT REST: 96 %
STRESS PEAK HR: 166 BPM
STRESS POST ESTIMATED WORKLOAD: 10.1 METS
STRESS POST EXERCISE DUR MIN: 9 MIN
STRESS POST O2 SAT PEAK: 96 %
STRESS POST PEAK BP: 200 MMHG

## 2023-08-03 PROCEDURE — 93016 CV STRESS TEST SUPVJ ONLY: CPT | Performed by: INTERNAL MEDICINE

## 2023-08-03 PROCEDURE — 93018 CV STRESS TEST I&R ONLY: CPT | Performed by: INTERNAL MEDICINE

## 2023-08-03 PROCEDURE — 93227 XTRNL ECG REC<48 HR R&I: CPT | Performed by: INTERNAL MEDICINE

## 2023-08-03 NOTE — TELEPHONE ENCOUNTER
----- Message from Gilberto Rizvi MD sent at 8/3/2023 10:37 AM EDT -----  Please call and inform the patient that the Echocardiogram showed normal pumping function of the heart. No significant valve abnormality was seen. There is some dilatation of the base of the aorta.   I am recommending/ordering a CT scan of the chest for more accurate measurement of the aorta
----- Message from Peyton Santos MD sent at 8/3/2023 10:37 AM EDT -----  Please inform the patient that the stress test showed NO evidence of any significant blockage in the blood vessels of your heart.
Pt informed of Dr Danelle Villegas message, in agreement
Spoke to pt regarding results    Pt in agreement for CT scan    He mentioned he was told during the exam maybe his bp meds should be adjusted as his bp was high during test and took a while to come back down.
none

## 2023-08-03 NOTE — TELEPHONE ENCOUNTER
----- Message from Barrington Chapman MD sent at 8/3/2023  3:19 PM EDT -----  Please call and inform patient that the Holter monitor was reviewed. This did not reveal any significant abnormality or arrhythmia  No symptom diary was submitted  No immediate treatment or action is needed.   Will discuss further at next office visit

## 2023-08-04 ENCOUNTER — HOSPITAL ENCOUNTER (OUTPATIENT)
Dept: SLEEP CENTER | Facility: CLINIC | Age: 53
Discharge: HOME | End: 2023-08-04
Payer: COMMERCIAL

## 2023-08-04 DIAGNOSIS — G47.33 OSA (OBSTRUCTIVE SLEEP APNEA): ICD-10-CM

## 2023-08-04 PROCEDURE — 95811 POLYSOM 6/>YRS CPAP 4/> PARM: CPT

## 2023-08-05 NOTE — PROGRESS NOTES
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  Caffeine use:NO    Alcohol:Dayshift workers after 5PM: Alcohol use:NO    Typical day for patient:YES       Study Documentation    Sleep Study Indications:     Sleep Study: Treatment   Optimal PAP pressure: 12cm  Leak:Small  Snore:Eliminated  REM Obtained:yes  Supplemental O2: no    Minimum SaO2 90%  Baseline SaO2 95%  PAP mask tried (list all)Lakeshia Dodson  PAP mask choice (final)Krunal and Anders vitcamilo  PAP mask type:full face  PAP pressure at which snoring was eliminated 10cm  Minimum SaO2 at final PAP pressure 92%  Mode of Therapy:CPAP  ETCO2:No  CPAP changed to BiPAP:No    Mode of Therapy:CPAP    EKG abnormalities: no     EEG abnormalities: no    Sleep Study Recorded < 2 hours: N/A    Sleep Study Recorded > 2 hours but incomplete study: N/A    Sleep Study Recorded 6 hours but no sleep obtained: NO    Patient classification: employed       Post-Sleep Study    Medication used at bedtime or during sleep study:NO    Patient reports time it took to fall asleep:greater than 60 minutes    Patient reports waking up during study:3 or more times. Patient reports returning to sleep without difficulty. Patient reports sleeping 4 to 6 hours without dreaming. Patient reports sleep during study:better than usual    Patient rated sleepiness: Not sleepy or tired    PAP treatment:yes: Post PAP treatment patient reports feeling better and  would wear PAP mask at home.

## 2023-08-07 DIAGNOSIS — G47.33 OSA (OBSTRUCTIVE SLEEP APNEA): Primary | ICD-10-CM

## 2023-08-08 ENCOUNTER — TELEPHONE (OUTPATIENT)
Dept: FAMILY MEDICINE CLINIC | Facility: CLINIC | Age: 53
End: 2023-08-08

## 2023-08-08 NOTE — TELEPHONE ENCOUNTER
----- Message from Kathi Lee sent at 8/8/2023  2:54 PM EDT -----  Regarding: CPAP Rx   Contact: [phone]  Good afternoon,   I went for sleep study on 8/4 . Wanted to follow up on getting new machine ( current one recalled ) . I have no numbers to call or who to message in the sleep study department?  Please advise

## 2023-08-08 NOTE — TELEPHONE ENCOUNTER
Patient called regarding sleep study contact number. Spoke to patient sleep study contact number given to patient. No further action needed at this time.

## 2023-08-09 DIAGNOSIS — M54.6 CHRONIC BILATERAL THORACIC BACK PAIN: ICD-10-CM

## 2023-08-09 DIAGNOSIS — E78.2 MIXED HYPERLIPIDEMIA: ICD-10-CM

## 2023-08-09 DIAGNOSIS — M54.2 NECK PAIN: ICD-10-CM

## 2023-08-09 DIAGNOSIS — E11.9 TYPE 2 DIABETES MELLITUS WITHOUT COMPLICATION, WITHOUT LONG-TERM CURRENT USE OF INSULIN (HCC): ICD-10-CM

## 2023-08-09 DIAGNOSIS — G89.29 CHRONIC BILATERAL THORACIC BACK PAIN: ICD-10-CM

## 2023-08-09 DIAGNOSIS — I10 ESSENTIAL HYPERTENSION: ICD-10-CM

## 2023-08-09 DIAGNOSIS — M62.830 MUSCLE SPASM OF BACK: ICD-10-CM

## 2023-08-09 DIAGNOSIS — E78.1 HYPERTRIGLYCERIDEMIA: ICD-10-CM

## 2023-08-10 RX ORDER — MELOXICAM 15 MG/1
15 TABLET ORAL DAILY
Qty: 90 TABLET | Refills: 0 | Status: SHIPPED | OUTPATIENT
Start: 2023-08-10

## 2023-08-10 RX ORDER — FENOFIBRATE 54 MG/1
54 TABLET ORAL DAILY
Qty: 90 TABLET | Refills: 0 | Status: SHIPPED | OUTPATIENT
Start: 2023-08-10

## 2023-08-10 RX ORDER — ATORVASTATIN CALCIUM 20 MG/1
20 TABLET, FILM COATED ORAL DAILY
Qty: 90 TABLET | Refills: 0 | Status: SHIPPED | OUTPATIENT
Start: 2023-08-10

## 2023-08-10 RX ORDER — METHOCARBAMOL 500 MG/1
500 TABLET, FILM COATED ORAL 2 TIMES DAILY PRN
Qty: 120 TABLET | Refills: 0 | Status: SHIPPED | OUTPATIENT
Start: 2023-08-10

## 2023-08-10 RX ORDER — LISINOPRIL 10 MG/1
10 TABLET ORAL DAILY
Qty: 90 TABLET | Refills: 0 | Status: SHIPPED | OUTPATIENT
Start: 2023-08-10

## 2023-08-14 ENCOUNTER — TELEPHONE (OUTPATIENT)
Dept: SLEEP CENTER | Facility: CLINIC | Age: 53
End: 2023-08-14

## 2023-08-14 NOTE — TELEPHONE ENCOUNTER
CPAP study resulted. Per study order, Mouna FERGUSON requests follow up with with sleep specialist.    Patient was last seen 9/26/2019 in the sleep center. Since it has been over 3 years since last appointment, patient will need to schedule sleep consult. Called patient and stated my name and where I was calling from ([address] Patient asked my name and asked how I got his phone number. Explained that I was calling from the sleep center with his sleep study results and I got his number from his [facility] electronic chart. Patient became agitated and said he didn't give permission for the call to be recorded and it was against the law. Explained that the call was not being recorded and I was simply calling to let him know his sleep study was resulted. Patient became increasing agitated and repeated again that he didn't give permission for the call to be recorded and kept asking my name. Patient wouldn't let me explain why I was calling, just kept talking over me and asking the same questions so I ended the call.

## 2023-08-15 NOTE — TELEPHONE ENCOUNTER
Call placed to patient. Advised CPAP study is resulted and Hannah Bernal is recommending consultation with a sleep specialist.    Patient scheduled for consult with Dr. Charles Calix 8/29/2023 in the [address] [facility] office.

## 2023-08-29 ENCOUNTER — OFFICE VISIT (OUTPATIENT)
Dept: SLEEP CENTER | Facility: CLINIC | Age: 53
End: 2023-08-29
Payer: COMMERCIAL

## 2023-08-29 VITALS
SYSTOLIC BLOOD PRESSURE: 138 MMHG | HEIGHT: 71 IN | OXYGEN SATURATION: 96 % | BODY MASS INDEX: 35.64 KG/M2 | DIASTOLIC BLOOD PRESSURE: 90 MMHG | WEIGHT: 254.6 LBS | HEART RATE: 81 BPM

## 2023-08-29 DIAGNOSIS — G47.33 OSA (OBSTRUCTIVE SLEEP APNEA): Primary | ICD-10-CM

## 2023-08-29 PROCEDURE — 99203 OFFICE O/P NEW LOW 30 MIN: CPT | Performed by: INTERNAL MEDICINE

## 2023-08-29 NOTE — PROGRESS NOTES
Sleep Medicine Outpatient Note   Marcial Gonzalez 47 y.o. male MRN: [id number]  8/29/2023      Referring Physician: PHYLLIS Peterson    Reason for Consultation:  Obstructive Sleep Apnea     Assessment/Plan:    Obstructive Sleep Apnea  - Recommend new CPAP machine      History of Present Illness   HPI:  Marcial Gonzalez is a 47 y.o. male who has a past medical history of Obstructive Sleep Apnea diagnosed in 2019, presenting for evaluation for new CPAP machine. His 2019 sleep study showed severe [place] with AHI of 56. He was started on CPAP, and says he used his machine regularly for 2-3 years, with relief of his daytime sleepiness symptoms. He says he eventually stopped using his machine because he was feeling that his sleep symptom had improved. He also found that his CPAP machine model had been recalled, so he did not want to resume using it. He says now he has been having increased nighttime awakenings and feeling more fatigued during the day, which were symptoms he experienced prior to starting CPAP in 2019. He works from home as a . He feels "sluggish and foggy" and has difficulty concentrating during the day. He would like to start using CPAP again, but would like a new machine since his previous one was recalled. Caffeine: 16-24oz coffee in the morning. Avoids afternoon/evening caffeine. Sleep Pattern:  -Location: Bedroom  -Bed/Recliner/Wedge: Bed  -# of pillows under head: 3 pillow, feels more comfortable propped up  -Position: Side  -Bedtime: 8-9pm  -Lights out: 8-9pm  -Latency: 20min  -Awakenings: 4-5x/night              -Reason: Unsure. Usually does not need to use restroom.                -Duration: Few minutes  -Fall back asleep easily: Yes  -Wake time: 6-7am              -Alarm: Wakes without alarm   -Rise time: 30min-1hr after waking  -Days off: Every day is the same  -Shift Work: No  -Patient's estimate of total sleep time: 7-8hrs  -Sleep aids/stimulants: No  -ETOH before sleeping: No  -Acid reflux: No     Daytime Symptoms:  -Upon Awakening: Not refreshed, rarely feels like he has a good night's sleep  -Daytime fatigue/sleepiness: Feels tired all the time  -Naps: Occasionally falls asleep in chair  -Driving: Difficulty with sleepiness and driving:  Sometimes              -- Close calls related to sleepiness:  Yes              -- Accidents related to sleepiness: No     Sleep Review of Symptoms:  -SDB  -snoring, gasping, choking during sleep: Yes to all 3  -Parasomnias:  --Sleep Walking: No  --Dream Enactment: No  --Bruxism: Was told in the past that he grinds, sometimes wakes with a sore jaw   -Motor:  --RLS: No  --PLMS: No  - Narcolepsy:  --Hallucinations: No  --Paralysis: No  --Cataplexy: No     Childhood Sleep History: none     Family History:  Family history of sleep disorders: unknown     Questionnaires:   [place] is 9    Historical Information   Past Medical History:   Diagnosis Date   • Class 2 severe obesity due to excess calories with serious comorbidity and body mass index (BMI) of 39.0 to 39.9 in adult ([facility]) 3/5/2019   • Diabetes 1.5, managed as type 2 ([facility]) 3/5/2019   • Essential hypertension 3/5/2019   • Morbid obesity with BMI of 40.0-44.9, adult ([facility]) 3/5/2019     Past Surgical History:   Procedure Laterality Date   • FRACTURE SURGERY      left ankle, plates and screws      Family History   Problem Relation Age of Onset   • Heart attack Maternal Grandmother    • Diabetes Maternal Grandfather    • Diabetes Paternal Grandmother    • Mental illness Neg Hx    • Substance Abuse Neg Hx          Meds/Allergies     Current Outpatient Medications:   •  atorvastatin (LIPITOR) 20 mg tablet, Take 1 tablet (20 mg total) by mouth daily, Disp: 90 tablet, Rfl: 0  •  fenofibrate (TRICOR) 54 MG tablet, Take 1 tablet (54 mg total) by mouth daily, Disp: 90 tablet, Rfl: 0  •  lisinopril (ZESTRIL) 10 mg tablet, Take 1 tablet (10 mg total) by mouth daily, Disp: 90 tablet, Rfl: 0  •  meloxicam (Mobic) 15 mg tablet, Take 1 tablet (15 mg total) by mouth daily, Disp: 90 tablet, Rfl: 0  •  metFORMIN (GLUCOPHAGE) 500 mg tablet, Take 2 tablets (1,000 mg total) by mouth daily with breakfast, Disp: 180 tablet, Rfl: 0  •  methocarbamol (ROBAXIN) 500 mg tablet, Take 1 tablet (500 mg total) by mouth 2 (two) times a day as needed for muscle spasms, Disp: 120 tablet, Rfl: 0  Allergies   Allergen Reactions   • Pollen Extract Nasal Congestion     Itchy eyes       Vitals: Blood pressure 138/90, pulse 81, height 5' 11" (1.803 m), weight 115 kg (254 lb 9.6 oz), SpO2 96 %. Body mass index is 35.51 kg/m². Oxygen Therapy  SpO2: 96 %      Physical Exam  HENT:      Head: Normocephalic and atraumatic. Nose: Nose normal.      Mouth/Throat:      Mouth: Mucous membranes are moist.      Pharynx: Oropharynx is clear. Comments: Mallampati class 4  Cardiovascular:      Rate and Rhythm: Normal rate and regular rhythm. Pulses: Normal pulses. Heart sounds: Normal heart sounds. Pulmonary:      Effort: Pulmonary effort is normal.      Breath sounds: Normal breath sounds. Abdominal:      Palpations: Abdomen is soft. Tenderness: There is no abdominal tenderness. Musculoskeletal:      Cervical back: Neck supple. Right lower leg: No edema. Left lower leg: No edema. Skin:     General: Skin is warm and dry. Neurological:      General: No focal deficit present. Mental Status: He is alert and oriented to person, place, and time.        Neck Circumference: 19       Sleep Study:  CPAP Study 8/5/2013  Previously confirmed sleep disordered breathing that appears to sufficiently remediated with positive airway pressure (see report for complete information)    Diagnostic Sleep Study 4/30/2019  Severe Obstructive Sleep Apnea - AHI 56.1 (see report for complete information)       Zhang Jauregui MD  PGY-1 Transitional Year Resident  [facility]      Portions of the record may have been created with voice recognition software. Occasional wrong word or "sound a like" substitutions may have occurred due to the inherent limitations of voice recognition software. Please read the chart carefully and recognize, using context, where substitutions have occurred.

## 2023-08-29 NOTE — PATIENT INSTRUCTIONS
CPAP MAINTENANCE AND MANAGEMENT    1. Continue use of CPAP equipment nightly - use any time you are laying down to rest, watch TV, etc to increase use and in case you fall asleep to prevent falling asleep without it  2. If air is too hot, turn down the tubing heating setting  3. If excessive dry mouth in the morning, turn up humidifier setting and be sure to only use distilled water in your humidifier. You can also turn down the tubing heating setting  4. Change your filter regularly and wash the non-disposable filter  5. Continue to clean your equipment, as discussed, using mild soap and water. You can use unscented baby wipe on the mask. 6.  Contact the Sleep Disorders Center with any questions or concerns prior to your next visit, as needed  7. Schedule visit for follow-up in 2-3 months after CPAP initiation. You should see your sleep physician at least once a year. HOW TO CLEAN YOUR AUTO CPAP MACHINE    Mask: Clean the cushion of your mask daily. Dish soap and warm water. (Usually eligible for mask cushions every 3 months)  2. Headgear: Once a week. I find when you wash it daily it eats the material of the Velcro faster.  (Usually eligible for new headgear every 6 months)  3. Heated Tubing: Clean the tube every 3-7 days. One drop of dish soap run warm water through the tube then hang it over your shower curtain and let it drip dry. (Usually eligible every 3 months)  4. Humidifier: Rinse out every 1-3 days. Dish soap warm water or , top shelf. (eligible every 6 months) **DISTILLED WATER ONLY**  5. Filters:  Dark blue (reusable for 6 months)- Rinse under warm water (no soap) sit and drip dry every 2-3 weeks. (eligible for a new one every 6 months)  Light Blue (disposable) throw away every 2-4 weeks depending on how dirty it looks. (eligible for 6 every 3 months)    Check with your insurance and durable medical equipment company regarding supply replacements.      Nursing Support:  When: Monday through Friday 7A-5PM except holidays  Where: Our direct line is [phone]. If you are having a true emergency please call 911. In the event that the line is busy or it is after hours please leave a voice message and we will return your call. Please speak clearly, leaving your full name, birth date, best number to reach you and the reason for your call. Medication refills: We will need the name of the medication, the dosage, the ordering provider, whether you get a 30 or 90 day refill, and the pharmacy name and address. Medications will be ordered by the provider only. Nurses cannot call in prescriptions. Please allow 7 days for medication refills. Physician requested updates: If your provider requested that you call with an update after starting medication, please be ready to provide us the medication and dosage, what time you take your medication, the time you attempt to fall asleep, time you fall asleep, when you wake up, and what time you get out of bed. Sleep Study Results: We will contact you with sleep study results and/or next steps after the physician has reviewed your testing. Usually one of our nurses will call to talk about the results within 1-2 weeks of testing.

## 2023-08-30 ENCOUNTER — TELEPHONE (OUTPATIENT)
Dept: SLEEP CENTER | Facility: CLINIC | Age: 53
End: 2023-08-30

## 2023-08-30 NOTE — ASSESSMENT & PLAN NOTE
PSG 5/1/2019 -AHI 56, 51 minutes of nocturnal hypoxemia associated with sleep apnea. Previously used CPAP for 2 years with improvement in symptoms of EDS. Stopped using [employer] when it was recalled and the machine is no longer functional.  Noted that EDS has worsened, with decreased concentration. Ordered replacement CPAP as previous CPAP is broken beyond repair. I advised pt that new HST may be needed to replace his CPAP.       We discussed Inspire, which he needs to lose some weight for - and he declined at this time

## 2023-08-31 ENCOUNTER — TELEPHONE (OUTPATIENT)
Dept: FAMILY MEDICINE CLINIC | Facility: CLINIC | Age: 53
End: 2023-08-31

## 2023-08-31 LAB

## 2023-08-31 NOTE — TELEPHONE ENCOUNTER
LM for patient to have labs completed prior to appt. Advised  patient if labs are not completed the appt will need to be rescheduled.

## 2023-09-06 LAB
DME PARACHUTE DELIVERY DATE EXPECTED: NORMAL
DME PARACHUTE DELIVERY DATE REQUESTED: NORMAL
DME PARACHUTE DELIVERY NOTE: NORMAL
DME PARACHUTE ITEM DESCRIPTION: NORMAL
DME PARACHUTE ORDER STATUS: NORMAL
DME PARACHUTE SUPPLIER NAME: NORMAL
DME PARACHUTE SUPPLIER PHONE: NORMAL

## 2023-09-07 ENCOUNTER — TELEPHONE (OUTPATIENT)
Dept: FAMILY MEDICINE CLINIC | Facility: CLINIC | Age: 53
End: 2023-09-07

## 2023-09-07 NOTE — TELEPHONE ENCOUNTER
----- Message from Glo Dwyer, [address] [facility] sent at 9/7/2023  6:49 AM EDT -----  Regarding: labs  Pt has upcoming appt, tomorrow, for physical and DM fu.  Needs to have labs done prior to appt. Not done as of today. Orders in chart. Please call pt to advise. If labs are not done, appt will need to be re-scheduled. TY    LM for pt to have labs done or will need to be rescheduled.

## 2023-09-11 LAB

## 2023-09-21 ENCOUNTER — TELEPHONE (OUTPATIENT)
Dept: FAMILY MEDICINE CLINIC | Facility: CLINIC | Age: 53
End: 2023-09-21

## 2023-09-21 NOTE — TELEPHONE ENCOUNTER
----- Message from [place], [facility] sent at 9/21/2023  6:46 AM EDT -----  Regarding: labs  Pt has upcoming appt for physical and DM fu.  Needs to have labs done prior to appt. As of today, labs have not been done. Orders in chart. Please call pt to advise. If labs are not done, appt will need to be re-scheduled. TY    Lm for pt to have labs done prior to appt on 9/22 or will need to be rescheduled.

## 2023-10-24 ENCOUNTER — TELEPHONE (OUTPATIENT)
Dept: NEUROLOGY | Facility: CLINIC | Age: 53
End: 2023-10-24

## 2023-10-24 NOTE — TELEPHONE ENCOUNTER
Called but unable to send text message or LVM due to voicemail not being set up. Unable to inform pt abt upcoming appt w/ Dr. Debra Kerr.

## 2023-10-30 ENCOUNTER — OFFICE VISIT (OUTPATIENT)
Dept: NEUROLOGY | Facility: CLINIC | Age: 53
End: 2023-10-30
Payer: COMMERCIAL

## 2023-10-30 VITALS
HEART RATE: 86 BPM | DIASTOLIC BLOOD PRESSURE: 70 MMHG | BODY MASS INDEX: 35.39 KG/M2 | SYSTOLIC BLOOD PRESSURE: 130 MMHG | HEIGHT: 71 IN | WEIGHT: 252.8 LBS

## 2023-10-30 DIAGNOSIS — I10 ESSENTIAL HYPERTENSION: ICD-10-CM

## 2023-10-30 DIAGNOSIS — G47.33 OSA (OBSTRUCTIVE SLEEP APNEA): Primary | ICD-10-CM

## 2023-10-30 DIAGNOSIS — E66.09 CLASS 2 OBESITY DUE TO EXCESS CALORIES WITHOUT SERIOUS COMORBIDITY WITH BODY MASS INDEX (BMI) OF 35.0 TO 35.9 IN ADULT: ICD-10-CM

## 2023-10-30 DIAGNOSIS — R06.83 SNORING: ICD-10-CM

## 2023-10-30 PROCEDURE — 3078F DIAST BP <80 MM HG: CPT | Performed by: INTERNAL MEDICINE

## 2023-10-30 PROCEDURE — 99214 OFFICE O/P EST MOD 30 MIN: CPT | Performed by: INTERNAL MEDICINE

## 2023-10-30 PROCEDURE — 3075F SYST BP GE 130 - 139MM HG: CPT | Performed by: INTERNAL MEDICINE

## 2023-10-30 NOTE — PROGRESS NOTES
Progress Note - Sleep Medicine  Mikael Joseph 47 y.o. male MRN: [id number]       Impression & Plan:     1. Severe obstructive sleep apnea  Diagnostic PSG 2019 at 280 pounds  AHI 56.1    CPAP study 2023 at 256 pounds showed optimal pressure of 12 cm H2O    Compliance  AirSense 11 APAP 8-15 cm H2O  73% more than 4 hours, average usage 6 hours and 35 minutes  95th percentile pressure 12.4, median 9.8  Median leak 0.0  Residual AHI 1.6    2. Snoring  Improved with CPAP use    3. Excessive daytime sleepiness  Improved with CPAP use  Current [place] score of 6    4. Obesity  BMI 35.26  Counseled patient on lifestyle modifications including diet and exercise    ______________________________________________________________________    HPI:    Mikael Joseph is a 28-year-old male with a past medical history of hypertension and type 2 diabetes, hyperlipidemia who presents for follow-up of severe obstructive sleep apnea. He was initially diagnosed via in lab sleep study in 2019 with an AHI of 56.1. He was 280 pounds at that time. He subsequently lost 24 pounds. He had a repeat CPAP study 2 months ago which showed an optimal pressure of 12 cm H2O. Compliance data shows excellent compliance. Residual AHI 1.4. Patient states he is no longer snoring and feels less tired. He feels all his symptoms have resolved with CPAP use. He is using it almost 7 hours per night. He does not report any side effects. Review of Systems:  Review of Systems   All other systems reviewed and are negative.         Social history updates:  Social History     Tobacco Use   Smoking Status Former    Types: Cigarettes    Quit date: 2004    Years since quittin.6   Smokeless Tobacco Former    Quit date: 1999     Social History     Socioeconomic History    Marital status:      Spouse name: Not on file    Number of children: Not on file    Years of education: Not on file    Highest education level: Not on file   Occupational History    Not on file   Tobacco Use    Smoking status: Former     Types: Cigarettes     Quit date: 2004     Years since quittin.6    Smokeless tobacco: Former     Quit date: 1999   Vaping Use    Vaping Use: Never used   Substance and Sexual Activity    Alcohol use: No    Drug use: No    Sexual activity: Yes     Partners: Female     Birth control/protection: None   Other Topics Concern    Not on file   Social History Narrative    Not on file     Social Determinants of Health     Financial Resource Strain: Not on file   Food Insecurity: Not on file   Transportation Needs: Not on file   Physical Activity: Not on file   Stress: Not on file   Social Connections: Not on file   Intimate Partner Violence: Not on file   Housing Stability: Not on file       PhysicalExamination:  Vitals:   /70 (BP Location: Left arm, Patient Position: Sitting, Cuff Size: Large)   Pulse 86   Ht 5' 11" (1.803 m)   Wt 115 kg (252 lb 12.8 oz)   BMI 35.26 kg/m²     Physical Exam  General:  Awake alert and oriented x 3, conversant without conversational dyspnea, NAD, normal affect  HEENT:  PERRL, Sclera noninjected, nonicteric OU, Nares patent,  no craniofacial abnormalities, Mucous membranes, moist, no oral lesions, normal dentition  NECK:  Trachea midline, no accessory muscle use, no stridor, no cervical or supraclavicular adenopathy, JVP not elevated  CARDIAC: Reg, single s1/S2, no m/r/g  PULM: CTA bilaterally no wheezing, rhonchi or rales  EXT: No cyanosis, no clubbing, no edema, normal capillary refill  NEURO: no focal neurologic deficits, AAOx3, moving all extremities appropriately     Diagnostic Data:  Labs:   I personally reviewed the most recent laboratory data pertinent to today's visit  Telephone on 2023   Component Date Value    Supplier Name 2023 [employer] - [address]     Supplier Phone Number 2023 [phone]     Order Status 2023 Delivery Successful     Delivery Note 08/30/2023 aBrbara Hendricks     Delivery Request Date 08/30/2023 09/12/2023     Date Delivered  08/30/2023 09/11/2023     Supplier Name 08/30/2023 09/11/2023     Item Description 08/30/2023 CPAP Machine, Resmed Airsense 11, Auto-CPAP     Item Description 08/30/2023 PAP Mask, Nasal Pillow, Fit Upon Setup, N/A, 1 per 3 months     Item Description 08/30/2023 PAP Mask Interface Cushion, Nasal Pillow, 2 per 1 month     Item Description 08/30/2023 PAP Headgear, 1 per 6 months     Item Description 08/30/2023 PAP Humidifier, Heated     Item Description 08/30/2023 Disposable PAP Filter, 2 per 1 month     Item Description 08/30/2023 Non-Disposable PAP Filter, 1 per 6 months     Item Description 08/30/2023 PAP Machine Tubing, Heated, 1 per 3 months     Item Description 08/30/2023 PAP Monitoring Modem     Item Description 08/30/2023 Nasal Pillows Included, Nasal Pillow Mask, 2 per 1 month     Item Description 08/30/2023 [address] [place], 1 per 6 months    Hospital Outpatient Visit on 08/02/2023   Component Date Value    Baseline HR 08/02/2023 101     Baseline BP 08/02/2023 110/70     O2 sat rest 08/02/2023 96     Stress peak HR 08/02/2023 166     Post peak BP 08/02/2023 200     Rate Pressure Product 08/02/2023 33,200.0     O2 sat peak 08/02/2023 96     Recovery HR 08/02/2023 126     Recovery BP 08/02/2023 124/80     Max HR 08/02/2023 166     Max HR Percent 08/02/2023 98     Exercise duration (min) 08/02/2023 9     Estimated workload 08/02/2023 10.1     Angina Index 08/02/2023 0     Protocol Name 08/02/2023 ADY     Time In Exercise Phase 08/02/2023 00:09:01     MAX.  SYSTOLIC BP 99/69/5413 225     Max Diastolic Bp 73/35/2682 342     Max Heart Rate 08/02/2023 166     Max Predicted Heart Rate 08/02/2023 169     Reason for Termination 08/02/2023 Target Heart Rate Achieved     Test Indication 08/02/2023 Dyspnea     Target Hr Formular 08/02/2023 (220 - Age)*100%     Chest Pain Statement 08/02/2023 none Hospital Outpatient Visit on 08/02/2023   Component Date Value    LA size 08/02/2023 4     Triscuspid Valve Regurgi* 08/02/2023 21.0     Tricuspid valve peak reg* 08/02/2023 2.30     Ao STJ 08/02/2023 3.50     LVPWd 08/02/2023 1.10     Left Atrium Area-systoli* 08/02/2023 23     Left Atrium Area-systoli* 08/02/2023 18.8     MV E' Tissue Velocity Se* 08/02/2023 5     MV E' Tissue Velocity La* 08/02/2023 6     Tricuspid annular plane * 08/02/2023 2.10     TR Peak Darío 08/02/2023 2.3     IVSd 08/02/2023 2.42     LV DIASTOLIC VOLUME (MOD* 60/35/4745 106     LEFT VENTRICLE SYSTOLIC * 35/27/8192 41     Left ventricular stroke * 08/02/2023 65.00     A4C EF 08/02/2023 63     LA length (A2C) 08/02/2023 5.70     Sinus of Valsalva, 2D 08/02/2023 4.4     LVIDd 08/02/2023 4.80     IVS 08/02/2023 1.2     LVIDS 08/02/2023 3.20     FS 08/02/2023 33     LA volume (BP) 08/02/2023 64     STJ 08/02/2023 3.5     Asc Ao 08/02/2023 3.6     Ao root 08/02/2023 4.00     RVID d 08/02/2023 3.7     MV valve area p 1/2 meth* 08/02/2023 2.82     E wave deceleration time 08/02/2023 270     MV Peak E Darío 08/02/2023 50     MV Peak A Darío 08/02/2023 0.58     RAA A4C 08/02/2023 16.2     MV stenosis pressure 1/2* 08/02/2023 78     LVSV, 2D 08/02/2023 65     LV EF 08/02/2023 55    Office Visit on 06/12/2023   Component Date Value    White Blood Cell Count 06/12/2023 8.3     Red Blood Cell Count 06/12/2023 5.41     Hemoglobin 06/12/2023 16.1     HCT 06/12/2023 45.8     MCV 06/12/2023 85     MCH 06/12/2023 29.8     MCHC 06/12/2023 35.2     RDW 06/12/2023 12.9     Platelet Count 41/36/9814 249     Neutrophils 06/12/2023 86     Lymphocytes 06/12/2023 9     Monocytes 06/12/2023 4     Eosinophils 06/12/2023 1     Basophils PCT 06/12/2023 0     Neutrophils (Absolute) 06/12/2023 7.0     Lymphocytes (Absolute) 06/12/2023 0.8     Monocytes (Absolute) 06/12/2023 0.3     Eosinophils (Absolute) 06/12/2023 0.1     Basophils ABS 06/12/2023 0.0     Immature Granulocytes 06/12/2023 0     Immature Granulocytes (A* 06/12/2023 0.0     TSH 06/12/2023 0.854     LYME TOTAL ANTIBODY EIA 06/12/2023 Negative     C-Reactive Protein, Quant 06/12/2023 20 (H)     Creatine Kinase, Total 06/12/2023 116        I have personally reviewed pertinent lab results.   Lab Results   Component Value Date    WBC 8.3 06/12/2023    HGB 16.1 06/12/2023    HCT 45.8 06/12/2023    MCV 85 06/12/2023     06/12/2023     Lab Results   Component Value Date    K 4.2 08/25/2022    CO2 24 08/25/2022     08/25/2022    BUN 16 08/25/2022    CREATININE 1.09 08/25/2022     No results found for: "IGE"  Lab Results   Component Value Date    ALT 81 (H) 08/25/2022    AST 45 (H) 08/25/2022     No results found for: "IRON", "TIBC", "FERRITIN"  No results found for: "[id number]"  No results found for: "FOLATE"      Arterial Blood Gas result: MD Luis Miguel R[facility]

## 2024-04-05 ENCOUNTER — OFFICE VISIT (OUTPATIENT)
Dept: FAMILY MEDICINE CLINIC | Facility: CLINIC | Age: 54
End: 2024-04-05
Payer: COMMERCIAL

## 2024-04-05 VITALS
DIASTOLIC BLOOD PRESSURE: 80 MMHG | HEART RATE: 109 BPM | WEIGHT: 244 LBS | RESPIRATION RATE: 16 BRPM | HEIGHT: 71 IN | BODY MASS INDEX: 34.16 KG/M2 | TEMPERATURE: 96.9 F | SYSTOLIC BLOOD PRESSURE: 136 MMHG | OXYGEN SATURATION: 99 %

## 2024-04-05 DIAGNOSIS — E78.2 MIXED HYPERLIPIDEMIA: ICD-10-CM

## 2024-04-05 DIAGNOSIS — M25.511 ACUTE PAIN OF RIGHT SHOULDER: Primary | ICD-10-CM

## 2024-04-05 DIAGNOSIS — I10 ESSENTIAL HYPERTENSION: ICD-10-CM

## 2024-04-05 DIAGNOSIS — E11.9 TYPE 2 DIABETES MELLITUS WITHOUT COMPLICATION, WITHOUT LONG-TERM CURRENT USE OF INSULIN (HCC): ICD-10-CM

## 2024-04-05 LAB — SL AMB POCT HEMOGLOBIN AIC: 8.5 (ref ?–6.5)

## 2024-04-05 PROCEDURE — 83036 HEMOGLOBIN GLYCOSYLATED A1C: CPT | Performed by: NURSE PRACTITIONER

## 2024-04-05 PROCEDURE — 36415 COLL VENOUS BLD VENIPUNCTURE: CPT | Performed by: NURSE PRACTITIONER

## 2024-04-05 PROCEDURE — 99214 OFFICE O/P EST MOD 30 MIN: CPT | Performed by: NURSE PRACTITIONER

## 2024-04-05 RX ORDER — NAPROXEN 500 MG/1
500 TABLET ORAL 2 TIMES DAILY WITH MEALS
Qty: 60 TABLET | Refills: 0 | Status: SHIPPED | OUTPATIENT
Start: 2024-04-05

## 2024-04-05 NOTE — PATIENT INSTRUCTIONS
Xray of shoulder has been ordered. Ice to area several times per day.   Naprosyn 500mg twice daily with food as needed for pain/inflammation.     Schedule appt with endocrinology as discussed  Labwork has been done and will be reviewed in office next week  Heart healthy, carbohydrate controlled diet.

## 2024-04-05 NOTE — PROGRESS NOTES
Name: Colin Pinon      : 1970      MRN: [id number]  Encounter Provider: PHYLLIS Lang  Encounter Date: 2024   Encounter department: [facility]    Assessment & Plan   1. Acute pain of right shoulder  Xray of shoulder has been ordered. Ice to area several times per day.   Naprosyn 500mg twice daily with food as needed for pain/inflammation.   - XR shoulder 2+ vw right; Future  - naproxen (NAPROSYN) 500 mg tablet; Take 1 tablet (500 mg total) by mouth 2 (two) times a day with meals  Dispense: 60 tablet; Refill: 0    2. Type 2 diabetes mellitus without complication, without long-term current use of insulin (HCC)  A1C 8.5  Pt does not want to resume oral diabetic meds  Discussed issues with non compliance and risks of uncontrolled DM. Pt verbalized understanding.   Would like to go to endocrine to discuss medication options.   - POCT hemoglobin A1c  - Comprehensive metabolic panel  - CBC and Platelet  - Lipid Panel with Direct LDL reflex  - Ambulatory Referral to Endocrinology; Future    3. Mixed hyperlipidemia  Heart healthy diet.   - Comprehensive metabolic panel  - CBC and Platelet  - Lipid Panel with Direct LDL reflex    4. Essential hypertension  Stable currently. Monitor.   - Comprehensive metabolic panel  - CBC and Platelet     Patient was counseled regarding instructions for management which included: impression/diagnosis, risk/benefits of treatment options, importance of compliance with treatment, risk factor reduction, and prognosis.   I have reviewed the instructions with the patient answering all questions and patient verbalized understanding.      Subjective      Fell off deck yesterday  Pain right arm, right elbow, right shoulder.   Elbow was more swollen last night and seems a little better today.   Left shoulder painful. Upper arm was sore but okay now.   Shoulder hurts with certain movements.   Took advil. Did not ice it.   Pt also with chronic issues  "and has not been taking any meds. DM, chol, bp.   Has not been taking meds for several months. States does not want to take daily meds. \"Forgets\" to take them.       Fall  Pertinent negatives include no abdominal pain, headaches or nausea.     Review of Systems   Constitutional:  Negative for fatigue and unexpected weight change.   Respiratory:  Negative for chest tightness and shortness of breath.    Gastrointestinal:  Negative for abdominal pain, diarrhea and nausea.   Endocrine: Negative for polydipsia and polyuria.   Musculoskeletal:  Positive for arthralgias (right shoulder).   Skin:  Positive for wound (abrasion right elbow).   Neurological:  Negative for dizziness and headaches.       Current Outpatient Medications on File Prior to Visit   Medication Sig    atorvastatin (LIPITOR) 20 mg tablet Take 1 tablet (20 mg total) by mouth daily (Patient not taking: Reported on 10/30/2023)    fenofibrate (TRICOR) 54 MG tablet Take 1 tablet (54 mg total) by mouth daily (Patient not taking: Reported on 10/30/2023)    lisinopril (ZESTRIL) 10 mg tablet Take 1 tablet (10 mg total) by mouth daily (Patient not taking: Reported on 10/30/2023)    meloxicam (Mobic) 15 mg tablet Take 1 tablet (15 mg total) by mouth daily (Patient not taking: Reported on 10/30/2023)    metFORMIN (GLUCOPHAGE) 500 mg tablet Take 2 tablets (1,000 mg total) by mouth daily with breakfast (Patient not taking: Reported on 10/30/2023)    methocarbamol (ROBAXIN) 500 mg tablet Take 1 tablet (500 mg total) by mouth 2 (two) times a day as needed for muscle spasms (Patient not taking: Reported on 10/30/2023)       Objective   Poct A1C 8.5    /80 (BP Location: Right arm, Patient Position: Sitting, Cuff Size: Large)   Pulse (!) 109   Temp (!) 96.9 °F (36.1 °C)   Resp 16   Ht 5' 11\" (1.803 m)   Wt 111 kg (244 lb)   SpO2 99%   BMI 34.03 kg/m²     Physical Exam  Vitals reviewed.   Constitutional:       General: He is not in acute distress.     " Appearance: He is not ill-appearing.   Cardiovascular:      Rate and Rhythm: Normal rate and regular rhythm.   Pulmonary:      Effort: Pulmonary effort is normal.      Breath sounds: Normal breath sounds.   Musculoskeletal:         General: Tenderness (right anterior shoulder) present. No swelling or deformity.      Cervical back: Normal range of motion and neck supple.      Comments: Pain with abduction and adduction right upper extremity   Skin:     General: Skin is warm and dry.      Comments: Superficial abrasion to right elbow   Neurological:      General: No focal deficit present.      Mental Status: He is alert and oriented to person, place, and time.      Cranial Nerves: No cranial nerve deficit.      Sensory: No sensory deficit.   Psychiatric:         Mood and Affect: Mood normal.         Behavior: Behavior normal.         Thought Content: Thought content normal.         Judgment: Judgment normal.       PHYLLIS Lang

## 2024-04-06 LAB
ALBUMIN SERPL-MCNC: 4.5 G/DL (ref 3.8–4.9)
ALBUMIN/GLOB SERPL: 1.6 {RATIO} (ref 1.2–2.2)
ALP SERPL-CCNC: 74 IU/L (ref 44–121)
ALT SERPL-CCNC: 74 IU/L (ref 0–44)
AST SERPL-CCNC: 41 IU/L (ref 0–40)
BILIRUB SERPL-MCNC: 0.6 MG/DL (ref 0–1.2)
BUN SERPL-MCNC: 12 MG/DL (ref 6–24)
BUN/CREAT SERPL: 12 (ref 9–20)
CALCIUM SERPL-MCNC: 9.9 MG/DL (ref 8.7–10.2)
CHLORIDE SERPL-SCNC: 96 MMOL/L (ref 96–106)
CHOLEST SERPL-MCNC: 235 MG/DL (ref 100–199)
CO2 SERPL-SCNC: 22 MMOL/L (ref 20–29)
CREAT SERPL-MCNC: 1.03 MG/DL (ref 0.76–1.27)
EGFR: 87 ML/MIN/1.73
ERYTHROCYTE [DISTWIDTH] IN BLOOD BY AUTOMATED COUNT: 12.6 % (ref 11.6–15.4)
GLOBULIN SER-MCNC: 2.9 G/DL (ref 1.5–4.5)
GLUCOSE SERPL-MCNC: 288 MG/DL (ref 70–99)
HCT VFR BLD AUTO: 46.7 % (ref 37.5–51)
HDLC SERPL-MCNC: 27 MG/DL
HGB BLD-MCNC: 16.2 G/DL (ref 13–17.7)
LDLC SERPL CALC-MCNC: 161 MG/DL (ref 0–99)
LDLC/HDLC SERPL: 6 RATIO (ref 0–3.6)
MCH RBC QN AUTO: 30.7 PG (ref 26.6–33)
MCHC RBC AUTO-ENTMCNC: 34.7 G/DL (ref 31.5–35.7)
MCV RBC AUTO: 89 FL (ref 79–97)
PLATELET # BLD AUTO: 307 X10E3/UL (ref 150–450)
POTASSIUM SERPL-SCNC: 4 MMOL/L (ref 3.5–5.2)
PROT SERPL-MCNC: 7.4 G/DL (ref 6–8.5)
RBC # BLD AUTO: 5.27 X10E6/UL (ref 4.14–5.8)
SL AMB VLDL CHOLESTEROL CALC: 47 MG/DL (ref 5–40)
SODIUM SERPL-SCNC: 136 MMOL/L (ref 134–144)
TRIGL SERPL-MCNC: 252 MG/DL (ref 0–149)
WBC # BLD AUTO: 8.1 X10E3/UL (ref 3.4–10.8)

## 2024-04-15 ENCOUNTER — TELEPHONE (OUTPATIENT)
Dept: FAMILY MEDICINE CLINIC | Facility: CLINIC | Age: 54
End: 2024-04-15

## 2024-04-15 ENCOUNTER — OFFICE VISIT (OUTPATIENT)
Dept: FAMILY MEDICINE CLINIC | Facility: CLINIC | Age: 54
End: 2024-04-15
Payer: COMMERCIAL

## 2024-04-15 VITALS
RESPIRATION RATE: 16 BRPM | BODY MASS INDEX: 33.6 KG/M2 | OXYGEN SATURATION: 97 % | DIASTOLIC BLOOD PRESSURE: 88 MMHG | TEMPERATURE: 97.9 F | HEIGHT: 71 IN | SYSTOLIC BLOOD PRESSURE: 132 MMHG | WEIGHT: 240 LBS | HEART RATE: 89 BPM

## 2024-04-15 DIAGNOSIS — E11.9 TYPE 2 DIABETES MELLITUS WITHOUT COMPLICATION, WITHOUT LONG-TERM CURRENT USE OF INSULIN (HCC): Primary | ICD-10-CM

## 2024-04-15 DIAGNOSIS — I10 ESSENTIAL HYPERTENSION: ICD-10-CM

## 2024-04-15 DIAGNOSIS — E78.2 MIXED HYPERLIPIDEMIA: ICD-10-CM

## 2024-04-15 PROCEDURE — 99214 OFFICE O/P EST MOD 30 MIN: CPT | Performed by: NURSE PRACTITIONER

## 2024-04-15 RX ORDER — LISINOPRIL 10 MG/1
10 TABLET ORAL DAILY
Qty: 90 TABLET | Refills: 3 | Status: SHIPPED | OUTPATIENT
Start: 2024-04-15

## 2024-04-15 RX ORDER — ATORVASTATIN CALCIUM 20 MG/1
20 TABLET, FILM COATED ORAL DAILY
Qty: 90 TABLET | Refills: 3 | Status: SHIPPED | OUTPATIENT
Start: 2024-04-15

## 2024-04-15 RX ORDER — AMOXICILLIN 875 MG/1
TABLET, COATED ORAL
COMMUNITY
Start: 2024-04-09

## 2024-04-15 NOTE — PROGRESS NOTES
Name: Colin Pinon      : 1970      MRN: [id number]  Encounter Provider: PHYLLIS Lang  Encounter Date: 4/15/2024   Encounter department: [facility]    Assessment & Plan   1. Type 2 diabetes mellitus without complication, without long-term current use of insulin (HCC)  Will resume metformin 500mg daily with breakfast. Pt agreeable until sees endocrine.   Carb controlled diet. Regular exercise.   - metFORMIN (GLUCOPHAGE) 500 mg tablet; Take 1 tablet (500 mg total) by mouth daily with breakfast  Dispense: 90 tablet; Refill: 1    2. Essential hypertension  Resume lisinopril. Monitor bp. Limit salt  - lisinopril (ZESTRIL) 10 mg tablet; Take 1 tablet (10 mg total) by mouth daily  Dispense: 90 tablet; Refill: 3    3. Mixed hyperlipidemia  Resume atorvastatin. Heart healthy diet. Statin.   - atorvastatin (LIPITOR) 20 mg tablet; Take 1 tablet (20 mg total) by mouth daily  Dispense: 90 tablet; Refill: 3    Patient was counseled regarding instructions for management which included: impression/diagnosis, risk/benefits of treatment options, importance of compliance with treatment, risk factor reduction, and prognosis.   I have reviewed the instructions with the patient answering all questions and patient verbalized understanding.         Subjective      Here for follow up and lab review  DM , not taking any meds.   Non compliant with meds/tx to date.   Seen a week ago and stated did not want to take daily med for DM. Wanted to see endocrine to discuss other options. Appt scheduled 2024  Also stopped taking bp and chol meds several months ago.   Admits does not follow low carb diet.   Does not check blood sugars.   Does not exercise regularly.     Diabetes  Pertinent negatives for hypoglycemia include no dizziness or headaches. Pertinent negatives for diabetes include no chest pain, no polydipsia and no polyuria.     Review of Systems   Constitutional:  Negative for unexpected  weight change.   Eyes:  Negative for visual disturbance.   Respiratory:  Negative for chest tightness and shortness of breath.    Cardiovascular:  Negative for chest pain and palpitations.   Gastrointestinal:  Negative for abdominal pain.   Endocrine: Negative for polydipsia and polyuria.   Musculoskeletal:  Negative for arthralgias and myalgias.   Skin:  Negative for rash and wound.   Allergic/Immunologic: Negative for immunocompromised state.   Neurological:  Negative for dizziness and headaches.       Current Outpatient Medications on File Prior to Visit   Medication Sig    amoxicillin (AMOXIL) 875 mg tablet     naproxen (NAPROSYN) 500 mg tablet Take 1 tablet (500 mg total) by mouth 2 (two) times a day with meals       Objective     Recent Results (from the past 672 hour(s))   Comprehensive metabolic panel    Collection Time: 04/05/24 11:16 AM   Result Value Ref Range    Glucose, Random 288 (H) 70 - 99 mg/dL    BUN 12 6 - 24 mg/dL    Creatinine 1.03 0.76 - 1.27 mg/dL    eGFR 87 >59 mL/min/1.73    SL AMB BUN/CREATININE RATIO 12 9 - 20    Sodium 136 134 - 144 mmol/L    Potassium 4.0 3.5 - 5.2 mmol/L    Chloride 96 96 - 106 mmol/L    CO2 22 20 - 29 mmol/L    CALCIUM 9.9 8.7 - 10.2 mg/dL    Protein, Total 7.4 6.0 - 8.5 g/dL    Albumin 4.5 3.8 - 4.9 g/dL    Globulin, Total 2.9 1.5 - 4.5 g/dL    Albumin/Globulin Ratio 1.6 1.2 - 2.2    TOTAL BILIRUBIN 0.6 0.0 - 1.2 mg/dL    Alk Phos Isoenzymes 74 44 - 121 IU/L    AST 41 (H) 0 - 40 IU/L    ALT 74 (H) 0 - 44 IU/L   CBC and Platelet    Collection Time: 04/05/24 11:16 AM   Result Value Ref Range    White Blood Cell Count 8.1 3.4 - 10.8 x10E3/uL    Red Blood Cell Count 5.27 4.14 - 5.80 x10E6/uL    Hemoglobin 16.2 13.0 - 17.7 g/dL    HCT 46.7 37.5 - 51.0 %    MCV 89 79 - 97 fL    MCH 30.7 26.6 - 33.0 pg    MCHC 34.7 31.5 - 35.7 g/dL    RDW 12.6 11.6 - 15.4 %    Platelet Count 307 150 - 450 x10E3/uL   Lipid Panel with Direct LDL reflex    Collection Time: 04/05/24 11:16 AM  "  Result Value Ref Range    Cholesterol, Total 235 (H) 100 - 199 mg/dL    Triglycerides 252 (H) 0 - 149 mg/dL    HDL 27 (L) >39 mg/dL    VLDL Cholesterol Calculated 47 (H) 5 - 40 mg/dL    LDL Calculated 161 (H) 0 - 99 mg/dL    LDl/HDL Ratio 6.0 (H) 0.0 - 3.6 ratio   POCT hemoglobin A1c    Collection Time: 04/05/24 11:56 AM   Result Value Ref Range    Hemoglobin A1C 8.5 (A) 6.5     Reviewed lab/diagnostic results with pt including both normal and abnormal findings.   In depth counseling and instructions given. All questions answered during visit.     /88   Pulse 89   Temp 97.9 °F (36.6 °C)   Resp 16   Ht 5' 11\" (1.803 m)   Wt 109 kg (240 lb)   SpO2 97%   BMI 33.47 kg/m²     Physical Exam  Vitals reviewed.   Constitutional:       General: He is not in acute distress.     Appearance: He is not ill-appearing.   Neck:      Vascular: No carotid bruit.   Cardiovascular:      Rate and Rhythm: Normal rate and regular rhythm.   Pulmonary:      Effort: Pulmonary effort is normal. No respiratory distress.      Breath sounds: Normal breath sounds. No wheezing or rales.   Musculoskeletal:      Cervical back: Normal range of motion.      Right lower leg: No edema.      Left lower leg: No edema.   Skin:     General: Skin is warm and dry.      Coloration: Skin is not jaundiced or pale.   Neurological:      General: No focal deficit present.      Mental Status: He is alert and oriented to person, place, and time.      Cranial Nerves: No cranial nerve deficit.      Sensory: No sensory deficit.   Psychiatric:         Mood and Affect: Mood normal.         Behavior: Behavior normal.         Thought Content: Thought content normal.         Judgment: Judgment normal.       PHYLLIS Lang    "

## 2024-04-15 NOTE — PATIENT INSTRUCTIONS
Resume Metformin 500mg daily with breakfast (Diabetes)  Resume Lisinopril 10mg daily (blood pressure)  Resume atorvastatin 20mg daily (cholesterol)  Heart healthy, carbohydrate controlled diet.   Regular exercise  Evaluation by endocrinology as scheduled.

## 2024-05-23 ENCOUNTER — CONSULT (OUTPATIENT)
Dept: ENDOCRINOLOGY | Facility: CLINIC | Age: 54
End: 2024-05-23
Payer: COMMERCIAL

## 2024-05-23 VITALS
DIASTOLIC BLOOD PRESSURE: 86 MMHG | SYSTOLIC BLOOD PRESSURE: 114 MMHG | OXYGEN SATURATION: 98 % | HEART RATE: 103 BPM | WEIGHT: 239 LBS | BODY MASS INDEX: 33.46 KG/M2 | HEIGHT: 71 IN

## 2024-05-23 DIAGNOSIS — E11.9 TYPE 2 DIABETES MELLITUS WITHOUT COMPLICATION, WITHOUT LONG-TERM CURRENT USE OF INSULIN (HCC): ICD-10-CM

## 2024-05-23 DIAGNOSIS — E11.65 TYPE 2 DIABETES MELLITUS WITH HYPERGLYCEMIA, WITHOUT LONG-TERM CURRENT USE OF INSULIN (HCC): Primary | ICD-10-CM

## 2024-05-23 DIAGNOSIS — G47.33 OSA (OBSTRUCTIVE SLEEP APNEA): ICD-10-CM

## 2024-05-23 DIAGNOSIS — E78.2 MIXED HYPERLIPIDEMIA: ICD-10-CM

## 2024-05-23 DIAGNOSIS — I10 ESSENTIAL HYPERTENSION: ICD-10-CM

## 2024-05-23 DIAGNOSIS — E66.9 CLASS 1 OBESITY WITH SERIOUS COMORBIDITY AND BODY MASS INDEX (BMI) OF 33.0 TO 33.9 IN ADULT, UNSPECIFIED OBESITY TYPE: ICD-10-CM

## 2024-05-23 DIAGNOSIS — R14.0 BLOATING: ICD-10-CM

## 2024-05-23 PROBLEM — E66.811 CLASS 1 OBESITY WITH SERIOUS COMORBIDITY AND BODY MASS INDEX (BMI) OF 33.0 TO 33.9 IN ADULT: Status: ACTIVE | Noted: 2024-05-23

## 2024-05-23 PROCEDURE — 99244 OFF/OP CNSLTJ NEW/EST MOD 40: CPT | Performed by: INTERNAL MEDICINE

## 2024-05-23 RX ORDER — METFORMIN HYDROCHLORIDE 500 MG/1
1000 TABLET, EXTENDED RELEASE ORAL
Qty: 180 TABLET | Refills: 2 | Status: SHIPPED | OUTPATIENT
Start: 2024-05-23

## 2024-05-23 RX ORDER — LANCETS
EACH MISCELLANEOUS
Qty: 100 EACH | Refills: 2 | Status: SHIPPED | OUTPATIENT
Start: 2024-05-23

## 2024-05-23 RX ORDER — BLOOD-GLUCOSE METER
EACH MISCELLANEOUS DAILY
Qty: 1 KIT | Refills: 0 | Status: SHIPPED | OUTPATIENT
Start: 2024-05-23

## 2024-05-23 RX ORDER — BLOOD SUGAR DIAGNOSTIC
STRIP MISCELLANEOUS
Qty: 100 EACH | Refills: 3 | Status: SHIPPED | OUTPATIENT
Start: 2024-05-23

## 2024-05-23 NOTE — PROGRESS NOTES
Colin Pinon 53 y.o. male MRN: [id number]    Encounter: [id number]  Referring Provider  Hellen Lang  [address] [place] [address]  [address]  [place] [address]    Assessment & Plan     Type 2 diabetes mellitus  Patient is a known diabetic for many years now unclear for how long.  Patient home dose metformin 500 mg twice daily reports being noncompliant with medication due to work.    Plan:  Will start the patient on metformin 500 mg XR daily and increase to 2 tablets daily if no side effects.  Start the patient on Ozempic 0.25 mg subcutaneously weekly if tolerating medication well  Will place order for blood glucose monitoring supplies  Referral for diabetic education  Follow-up with repeat A1c, CMP, lipid panel  Educated patient on having healthy balanced diet, low-carb and regular exercise at least 5 times a week 30 minutes duration    Recommend the following at this time    - Recommended a consistent carbohydrate diet   - weight control and exercise as discussed ( ideal is 30 min, at least 5 times a week)   - home glucose monitoring and goals emphasized, requested patient bring in glucose monitor or log sheets to next visit   - discussed signs and symptoms of hypoglycemia and how to correct them appropriately  - counseled about the long term complications of uncontrolled diabetes, including, Nephropathy, Neuropathy, CVD, Retinopathy and importance  of adherence to diet, treatment plan and life style modifications   - importance of following up with Opthalmology and podiatry   - glycohemoglobin and other lab monitoring discussed, A1c goal value reviewed  - long term diabetic complications discussed  - labs immediately prior to next visit     2. Hyperlipidemia  Patient reports not being compliant with medication.  Encourage taking regularly.   - continue statin therapy    3. Hypertension  Blood pressure controlled on this visit continue taking lisinopril 10 mg      CC: Diabetes    History of Present Illness      HPI:  Colin Pinon is a 53 y.o. male presents for a new visit regarding diabetes management.   Also has a h/o type 2 diabetes, hypertension, hyperlipidemia and obstructive sleep apnea on CPAP nightly.   Patient reports doing well overall is here for alternative to metformin due to noncompliance because of work/lifestyle.  Patient endorses wanting a weekly shot rather than an oral tablet daily.  Patient  also endorses not being compliant with his hypertensive and hyperlipidemia medications as well due to the same reason.  Patient does not follow a low-carb diet but says he is reducing the portions he is eating and has recently lost some weight.  Otherwise patient denies having any chest pain, shortness of breath, headache, nausea/vomiting, diarrhea or constipation.  All the question concerns were answered.       Last Eye exam: Approximately a year ago    Current regimen:   Metformin extended release 500 mg orally daily.  Increase to 2 tablets daily if no side effects  Ozempic 0.25mg subcutaneous weekly    Denies numbness or tingling in the hands or feet.   Denies changes in vision. No known retinopathy.   No chest pain/ SOB. No diarrhea/ constipation. No urinary complaints.   Exercise: Is overall active     Home glucose monitoring: are not performed      Symptoms of hypoglycemia :     All other systems were reviewed and are negative.    Review of Systems   Constitutional:  Negative for chills and fever.   HENT:  Negative for ear pain and sore throat.    Eyes:  Negative for pain and visual disturbance.   Respiratory:  Negative for cough and shortness of breath.    Cardiovascular:  Negative for chest pain and palpitations.   Gastrointestinal:  Negative for abdominal pain and vomiting.   Genitourinary:  Negative for dysuria and hematuria.   Musculoskeletal:  Negative for arthralgias and back pain.   Skin:  Negative for color change and rash.   Neurological:  Negative for seizures and syncope.   All other systems  reviewed and are negative.        Historical Information   Past Medical History:   Diagnosis Date   • Class 2 severe obesity due to excess calories with serious comorbidity and body mass index (BMI) of 39.0 to 39.9 in adult ([facility]) 3/5/2019   • Diabetes 1.5, managed as type 2 ([facility]) 3/5/2019   • Essential hypertension 3/5/2019   • Morbid obesity with BMI of 40.0-44.9, adult ([facility]) 3/5/2019     Past Surgical History:   Procedure Laterality Date   • FRACTURE SURGERY      left ankle, plates and screws      Social History   Social History     Substance and Sexual Activity   Alcohol Use No     Social History     Substance and Sexual Activity   Drug Use No     Social History     Tobacco Use   Smoking Status Former   • Current packs/day: 0.00   • Types: Cigarettes   • Quit date: 2004   • Years since quittin.2   Smokeless Tobacco Former   • Quit date: 1999     Family History:   Family History   Problem Relation Age of Onset   • Heart attack Maternal Grandmother    • Diabetes Maternal Grandfather    • Diabetes Paternal Grandmother    • Mental illness Neg Hx    • Substance Abuse Neg Hx        Meds/Allergies   Current Outpatient Medications   Medication Sig Dispense Refill   • atorvastatin (LIPITOR) 20 mg tablet Take 1 tablet (20 mg total) by mouth daily 90 tablet 3   • Blood Glucose Monitoring Suppl (OneTouch Verio) w/Device KIT Use in the morning 1 kit 0   • glucose blood (OneTouch Verio) test strip Use as instructed 100 each 3   • Lancets (onetouch ultrasoft) lancets Use as instructed 100 each 2   • lisinopril (ZESTRIL) 10 mg tablet Take 1 tablet (10 mg total) by mouth daily 90 tablet 3   • metFORMIN (GLUCOPHAGE-XR) 500 mg 24 hr tablet Take 2 tablets (1,000 mg total) by mouth daily with dinner 180 tablet 2   • semaglutide, 0.25 or 0.5 mg/dose, (Ozempic, 0.25 or 0.5 MG/DOSE,) 2 mg/3 mL injection pen Inject 0.375 mL (0.25 mg total) under the skin every 7 days 9 mL 1   • amoxicillin (AMOXIL) 875 mg tablet  (Patient  "not taking: Reported on 5/23/2024)     • naproxen (NAPROSYN) 500 mg tablet Take 1 tablet (500 mg total) by mouth 2 (two) times a day with meals (Patient not taking: Reported on 5/23/2024) 60 tablet 0     No current facility-administered medications for this visit.     Allergies   Allergen Reactions   • Pollen Extract Nasal Congestion     Itchy eyes       Objective   Vitals: Blood pressure 114/86, pulse 103, height 5' 11\" (1.803 m), weight 108 kg (239 lb), SpO2 98%.      Physical Exam  Vitals and nursing note reviewed.   Constitutional:       General: He is not in acute distress.     Appearance: He is well-developed.   HENT:      Head: Normocephalic and atraumatic.   Eyes:      Conjunctiva/sclera: Conjunctivae normal.   Cardiovascular:      Rate and Rhythm: Normal rate and regular rhythm.      Heart sounds: No murmur heard.  Pulmonary:      Effort: Pulmonary effort is normal. No respiratory distress.      Breath sounds: Normal breath sounds.   Abdominal:      Palpations: Abdomen is soft.      Tenderness: There is no abdominal tenderness.   Musculoskeletal:         General: No swelling.      Cervical back: Neck supple.   Feet:      Right foot:      Skin integrity: No ulcer, skin breakdown, erythema, warmth, callus or dry skin.      Left foot:      Skin integrity: No ulcer, skin breakdown, erythema, warmth, callus or dry skin.   Skin:     General: Skin is warm and dry.      Capillary Refill: Capillary refill takes less than 2 seconds.   Neurological:      Mental Status: He is alert.   Psychiatric:         Mood and Affect: Mood normal.      Diabetic Foot Exam    Patient's shoes and socks removed.    Right Foot/Ankle   Right Foot Inspection  Skin Exam: skin normal and skin intact. No dry skin, no warmth, no callus, no erythema, no maceration, no abnormal color, no pre-ulcer, no ulcer and no callus.     Toe Exam: ROM and strength within normal limits.     Sensory   Vibration: intact  Proprioception: intact  Monofilament " "testing: intact    Left Foot/Ankle  Left Foot Inspection  Skin Exam: skin normal and skin intact. No dry skin, no warmth, no erythema, no maceration, normal color, no pre-ulcer, no ulcer and no callus.     Toe Exam: ROM and strength within normal limits.     Sensory   Vibration: intact  Proprioception: intact  Monofilament testing: intact    Assign Risk Category  No deformity present    The history was obtained from the review of the chart, patient.    Lab Results:   Lab Results   Component Value Date/Time    Hemoglobin A1C 8.5 (A) 04/05/2024 11:56 AM    White Blood Cell Count 8.1 04/05/2024 11:16 AM    White Blood Cell Count 8.3 06/12/2023 03:00 PM    Hemoglobin 16.2 04/05/2024 11:16 AM    Hemoglobin 16.1 06/12/2023 03:00 PM    HCT 46.7 04/05/2024 11:16 AM    HCT 45.8 06/12/2023 03:00 PM    MCV 89 04/05/2024 11:16 AM    MCV 85 06/12/2023 03:00 PM    Platelet Count 307 04/05/2024 11:16 AM    Platelet Count 249 06/12/2023 03:00 PM    BUN 12 04/05/2024 11:16 AM    Potassium 4.0 04/05/2024 11:16 AM    Chloride 96 04/05/2024 11:16 AM    CO2 22 04/05/2024 11:16 AM    Creatinine 1.03 04/05/2024 11:16 AM    AST 41 (H) 04/05/2024 11:16 AM    ALT 74 (H) 04/05/2024 11:16 AM    Protein, Total 7.4 04/05/2024 11:16 AM    Albumin 4.5 04/05/2024 11:16 AM    Globulin, Total 2.9 04/05/2024 11:16 AM    HDL 27 (L) 04/05/2024 11:16 AM    Triglycerides 252 (H) 04/05/2024 11:16 AM           Imaging Studies: I have personally reviewed pertinent reports.      Portions of the record may have been created with voice recognition software. Occasional wrong word or \"sound a like\" substitutions may have occurred due to the inherent limitations of voice recognition software. Read the chart carefully and recognize, using context, where substitutions have occurred.   "

## 2024-05-23 NOTE — PATIENT INSTRUCTIONS
Discontinue regular metformin  Start metformin  mg orally daily.  If you do not have any gastric side effects, increase to 2 tablets daily  After few days, if you are tolerating the medication well, start Ozempic 0.25 mg subcutaneously weekly  Stagger check blood sugars before meals and at bedtime  You are being referred for diabetes education  Follow-up in 3 months with repeat labs

## 2024-05-24 ENCOUNTER — TELEPHONE (OUTPATIENT)
Dept: ENDOCRINOLOGY | Facility: CLINIC | Age: 54
End: 2024-05-24

## 2024-05-29 NOTE — TELEPHONE ENCOUNTER
PA for [id number]    Submitted via    []CMM-KEY    [][email or web address]-Case ID #    [x]Faxed to plan [phone]  []Other website    []Phone call Case ID #      Office notes sent, clinical questions answered. Awaiting determination    Turnaround time for your insurance to make a decision on your Prior Authorization can take 7-21 business days.

## 2024-05-30 ENCOUNTER — TELEPHONE (OUTPATIENT)
Dept: ENDOCRINOLOGY | Facility: CLINIC | Age: 54
End: 2024-05-30

## 2024-08-12 LAB
LEFT EYE DIABETIC RETINOPATHY: NORMAL
RIGHT EYE DIABETIC RETINOPATHY: NORMAL

## 2024-08-22 NOTE — TELEPHONE ENCOUNTER
----- Message from Contreras Schneider LPN sent at 3/71/2033 10:11 AM EDT -----  Regarding: Care Gap Requst  05/24/21 10:11 AM    Hello, our patient attached above has had Diabetic Eye Exam completed/performed  Please assist in updating the patient chart by making an External outreach to  [facility] facility located in  [facility] [place]  [phone])[phone] The date of service is 2021      Thank you,  LISANDRO Perdue [facility]

## 2024-09-16 ENCOUNTER — APPOINTMENT (OUTPATIENT)
Dept: LAB | Facility: CLINIC | Age: 54
End: 2024-09-16
Payer: COMMERCIAL

## 2024-09-16 ENCOUNTER — OFFICE VISIT (OUTPATIENT)
Dept: ENDOCRINOLOGY | Facility: CLINIC | Age: 54
End: 2024-09-16
Payer: COMMERCIAL

## 2024-09-16 VITALS
HEART RATE: 92 BPM | DIASTOLIC BLOOD PRESSURE: 83 MMHG | WEIGHT: 213.6 LBS | OXYGEN SATURATION: 99 % | BODY MASS INDEX: 29.9 KG/M2 | SYSTOLIC BLOOD PRESSURE: 121 MMHG | HEIGHT: 71 IN

## 2024-09-16 DIAGNOSIS — E78.2 MIXED HYPERLIPIDEMIA: ICD-10-CM

## 2024-09-16 DIAGNOSIS — G47.33 OSA (OBSTRUCTIVE SLEEP APNEA): ICD-10-CM

## 2024-09-16 DIAGNOSIS — E66.9 CLASS 1 OBESITY WITH SERIOUS COMORBIDITY AND BODY MASS INDEX (BMI) OF 33.0 TO 33.9 IN ADULT, UNSPECIFIED OBESITY TYPE: ICD-10-CM

## 2024-09-16 DIAGNOSIS — E11.65 TYPE 2 DIABETES MELLITUS WITH HYPERGLYCEMIA, WITHOUT LONG-TERM CURRENT USE OF INSULIN (HCC): ICD-10-CM

## 2024-09-16 DIAGNOSIS — I10 ESSENTIAL HYPERTENSION: ICD-10-CM

## 2024-09-16 DIAGNOSIS — E11.65 TYPE 2 DIABETES MELLITUS WITH HYPERGLYCEMIA, WITHOUT LONG-TERM CURRENT USE OF INSULIN (HCC): Primary | ICD-10-CM

## 2024-09-16 DIAGNOSIS — E55.9 VITAMIN D DEFICIENCY: ICD-10-CM

## 2024-09-16 PROBLEM — E66.811 CLASS 1 OBESITY WITH SERIOUS COMORBIDITY AND BODY MASS INDEX (BMI) OF 33.0 TO 33.9 IN ADULT: Status: RESOLVED | Noted: 2024-05-23 | Resolved: 2024-09-16

## 2024-09-16 LAB
25(OH)D3 SERPL-MCNC: 21.1 NG/ML (ref 30–100)
ALBUMIN SERPL BCG-MCNC: 4.3 G/DL (ref 3.5–5)
ALP SERPL-CCNC: 50 U/L (ref 34–104)
ALT SERPL W P-5'-P-CCNC: 24 U/L (ref 7–52)
ANION GAP SERPL CALCULATED.3IONS-SCNC: 8 MMOL/L (ref 4–13)
AST SERPL W P-5'-P-CCNC: 33 U/L (ref 13–39)
BILIRUB SERPL-MCNC: 0.51 MG/DL (ref 0.2–1)
BUN SERPL-MCNC: 21 MG/DL (ref 5–25)
CALCIUM SERPL-MCNC: 9.7 MG/DL (ref 8.4–10.2)
CHLORIDE SERPL-SCNC: 104 MMOL/L (ref 96–108)
CO2 SERPL-SCNC: 26 MMOL/L (ref 21–32)
CREAT SERPL-MCNC: 0.99 MG/DL (ref 0.6–1.3)
CREAT UR-MCNC: 77.6 MG/DL
GFR SERPL CREATININE-BSD FRML MDRD: 87 ML/MIN/1.73SQ M
GLUCOSE P FAST SERPL-MCNC: 78 MG/DL (ref 65–99)
MICROALBUMIN UR-MCNC: <7 MG/L
POTASSIUM SERPL-SCNC: 4.1 MMOL/L (ref 3.5–5.3)
PROT SERPL-MCNC: 6.7 G/DL (ref 6.4–8.4)
SL AMB POCT HEMOGLOBIN AIC: 5.2 (ref ?–6.5)
SODIUM SERPL-SCNC: 138 MMOL/L (ref 135–147)

## 2024-09-16 PROCEDURE — 82570 ASSAY OF URINE CREATININE: CPT

## 2024-09-16 PROCEDURE — 82043 UR ALBUMIN QUANTITATIVE: CPT

## 2024-09-16 PROCEDURE — 82306 VITAMIN D 25 HYDROXY: CPT

## 2024-09-16 PROCEDURE — 80053 COMPREHEN METABOLIC PANEL: CPT

## 2024-09-16 PROCEDURE — 83036 HEMOGLOBIN GLYCOSYLATED A1C: CPT

## 2024-09-16 PROCEDURE — 36415 COLL VENOUS BLD VENIPUNCTURE: CPT

## 2024-09-16 PROCEDURE — 99204 OFFICE O/P NEW MOD 45 MIN: CPT | Performed by: NURSE PRACTITIONER

## 2024-09-16 PROCEDURE — 83036 HEMOGLOBIN GLYCOSYLATED A1C: CPT | Performed by: NURSE PRACTITIONER

## 2024-09-16 NOTE — PATIENT INSTRUCTIONS
Hold metformin and check blood sugars on ozempic 0.25 mg weekly.   Please send in blood sugars in 1-2 weeks.

## 2024-09-16 NOTE — ASSESSMENT & PLAN NOTE
Lab Results   Component Value Date    HGBA1C 5.2 09/16/2024     HGA1C indicates good glycemic control.     Will hold metformin due to GI s/e and continue with ozempic 0.25 mg weekly with dietary modifications.     Discussed risks/complications associated with uncontrolled diabetes including organ involvement, heart attack, stroke, death.    Advised lifestyle modifications including attention to diet including the amount and types of carbohydrates consumed and regular activity.     Call for blood sugars less than 70 mg/dl or patterns over 250 mg/dl.     Recommendation for medical identification either cate, sofya.    Recommend routine follow up for diabetic eye and foot exams.     Ordered blood work to complete prior to next visit.    Follow up in 3 months.       Orders:    POCT hemoglobin A1c

## 2024-09-16 NOTE — PROGRESS NOTES
Ambulatory Visit  Name: Colin Pinon      : 1970      MRN: [id number]  Encounter Provider: PHYLLIS Vela  Encounter Date: 2024   Encounter department: [facility] [place]    Assessment & Plan  Type 2 diabetes mellitus with hyperglycemia, without long-term current use of insulin ([facility])    Lab Results   Component Value Date    HGBA1C 5.2 2024     HGA1C indicates good glycemic control.     Will hold metformin due to GI s/e and continue with ozempic 0.25 mg weekly with dietary modifications.     Discussed risks/complications associated with uncontrolled diabetes including organ involvement, heart attack, stroke, death.    Advised lifestyle modifications including attention to diet including the amount and types of carbohydrates consumed and regular activity.     Call for blood sugars less than 70 mg/dl or patterns over 250 mg/dl.     Recommendation for medical identification either bracelet, necklace.    Recommend routine follow up for diabetic eye and foot exams.     Ordered blood work to complete prior to next visit.    Follow up in 3 months.       Orders:    POCT hemoglobin A1c    Essential hypertension  BP under reasonable control.   Continues on lisinopril.   Managed by PCP.          Mixed hyperlipidemia  Continues on statin. Last lab work 2024  elevation in T-chol and LDL.   Recommend repeat lipid panel and CMP.              History of Present Illness     Colin Pinon is a 53 y.o. male who presents for follow up of diabetes mellitus diagnosed about 5 years ago with hypertension, hyperlipidemia.     UTD diabetic eye and foot exams.     Denies symptomatic hypo/hyperglycemia.     Dietary changes since last appointment very low carb. Blood sugars in 80s.     Exercise intermittent.     Current regimen:   Metformin use inconsistent takes 4-5 doses/week.   Ozempic 0.25 mg weekly     Denies abdominal pain, nausea, vomiting, diarrhea, or  "constipation.    Has constipation from metformin which has led to inconsistency in dosing.     History obtained from : patient  Review of Systems  See HPI.   All other systems reviewed and are negative.    Medical History Reviewed by provider this encounter:       Current Outpatient Medications on File Prior to Visit   Medication Sig Dispense Refill    atorvastatin (LIPITOR) 20 mg tablet Take 1 tablet (20 mg total) by mouth daily 90 tablet 3    lisinopril (ZESTRIL) 10 mg tablet Take 1 tablet (10 mg total) by mouth daily 90 tablet 3    metFORMIN (GLUCOPHAGE-XR) 500 mg 24 hr tablet Take 2 tablets (1,000 mg total) by mouth daily with dinner 180 tablet 2    semaglutide, 0.25 or 0.5 mg/dose, (Ozempic, 0.25 or 0.5 MG/DOSE,) 2 mg/3 mL injection pen Inject 0.375 mL (0.25 mg total) under the skin every 7 days 9 mL 1    amoxicillin (AMOXIL) 875 mg tablet  (Patient not taking: Reported on 5/23/2024)      Blood Glucose Monitoring Suppl (OneTouch Verio) w/Device KIT Use in the morning 1 kit 0    glucose blood (OneTouch Verio) test strip Use as instructed 100 each 3    Lancets (onetouch ultrasoft) lancets Use as instructed 100 each 2    naproxen (NAPROSYN) 500 mg tablet Take 1 tablet (500 mg total) by mouth 2 (two) times a day with meals (Patient not taking: Reported on 5/23/2024) 60 tablet 0     No current facility-administered medications on file prior to visit.          Objective     /83 (BP Location: Right arm, Patient Position: Sitting, Cuff Size: Large)   Pulse 92   Ht 5' 11\" (1.803 m)   Wt 96.9 kg (213 lb 9.6 oz)   SpO2 99%   BMI 29.79 kg/m²        Physical Exam  Vitals reviewed.   Constitutional:       Appearance: Normal appearance.   Cardiovascular:      Rate and Rhythm: Normal rate and regular rhythm.      Pulses: Normal pulses.      Heart sounds: Normal heart sounds.   Pulmonary:      Effort: Pulmonary effort is normal.      Breath sounds: Normal breath sounds.   Skin:     General: Skin is warm and dry.      " Capillary Refill: Capillary refill takes less than 2 seconds.   Neurological:      General: No focal deficit present.      Mental Status: He is alert and oriented to person, place, and time.   Psychiatric:         Mood and Affect: Mood normal.         Behavior: Behavior normal.     Administrative Statements

## 2024-09-17 ENCOUNTER — APPOINTMENT (OUTPATIENT)
Dept: LAB | Facility: CLINIC | Age: 54
End: 2024-09-17
Payer: COMMERCIAL

## 2024-09-17 DIAGNOSIS — I10 ESSENTIAL (PRIMARY) HYPERTENSION: ICD-10-CM

## 2024-09-17 DIAGNOSIS — E11.65 TYPE 2 DIABETES MELLITUS WITH HYPERGLYCEMIA, WITHOUT LONG-TERM CURRENT USE OF INSULIN (HCC): ICD-10-CM

## 2024-09-17 DIAGNOSIS — E78.2 MIXED HYPERLIPIDEMIA: ICD-10-CM

## 2024-09-17 DIAGNOSIS — G47.33 OBSTRUCTIVE SLEEP APNEA (ADULT) (PEDIATRIC): ICD-10-CM

## 2024-09-17 LAB
CHOLEST SERPL-MCNC: 195 MG/DL
EST. AVERAGE GLUCOSE BLD GHB EST-MCNC: 105 MG/DL
HBA1C MFR BLD: 5.3 %
HDLC SERPL-MCNC: 33 MG/DL
LDLC SERPL CALC-MCNC: 145 MG/DL (ref 0–100)
NONHDLC SERPL-MCNC: 162 MG/DL
TRIGL SERPL-MCNC: 86 MG/DL

## 2024-09-17 PROCEDURE — 36415 COLL VENOUS BLD VENIPUNCTURE: CPT

## 2024-09-17 PROCEDURE — 80061 LIPID PANEL: CPT

## 2024-10-22 ENCOUNTER — OFFICE VISIT (OUTPATIENT)
Dept: FAMILY MEDICINE CLINIC | Facility: CLINIC | Age: 54
End: 2024-10-22
Payer: COMMERCIAL

## 2024-10-22 VITALS
RESPIRATION RATE: 18 BRPM | HEART RATE: 70 BPM | SYSTOLIC BLOOD PRESSURE: 118 MMHG | BODY MASS INDEX: 29.54 KG/M2 | DIASTOLIC BLOOD PRESSURE: 68 MMHG | WEIGHT: 211 LBS | TEMPERATURE: 95.1 F | HEIGHT: 71 IN

## 2024-10-22 DIAGNOSIS — E11.65 TYPE 2 DIABETES MELLITUS WITH HYPERGLYCEMIA, WITHOUT LONG-TERM CURRENT USE OF INSULIN (HCC): Primary | ICD-10-CM

## 2024-10-22 DIAGNOSIS — E55.9 VITAMIN D DEFICIENCY: ICD-10-CM

## 2024-10-22 DIAGNOSIS — I10 ESSENTIAL HYPERTENSION: ICD-10-CM

## 2024-10-22 DIAGNOSIS — E78.2 MIXED HYPERLIPIDEMIA: ICD-10-CM

## 2024-10-22 PROCEDURE — 99214 OFFICE O/P EST MOD 30 MIN: CPT | Performed by: NURSE PRACTITIONER

## 2024-10-22 NOTE — ASSESSMENT & PLAN NOTE
Pt stopped taking statin  Lengthy d/w pt regarding increased CV risks in DM patients and role of statins.   Verbalized understanding but does not want to resume.   Would like to have labs repeated in 3 months and then make decision  Heart healthy diet.   Orders:    Comprehensive metabolic panel; Future    CBC and Platelet; Future    Lipid Panel with Direct LDL reflex; Future    Comprehensive metabolic panel    CBC and Platelet    Lipid Panel with Direct LDL reflex

## 2024-10-22 NOTE — ASSESSMENT & PLAN NOTE
Controlled. Doing well on current regimen  Being managed by endocrine.   Monitor.   Lab Results   Component Value Date    HGBA1C 5.3 09/16/2024     Orders:    Hemoglobin A1C; Future    Comprehensive metabolic panel; Future    CBC and Platelet; Future    Lipid Panel with Direct LDL reflex; Future    Hemoglobin A1C    Comprehensive metabolic panel    CBC and Platelet    Lipid Panel with Direct LDL reflex

## 2024-10-22 NOTE — ASSESSMENT & PLAN NOTE
Just started taking vitamin D supplement.   Will check level in 3 months  Orders:    Comprehensive metabolic panel; Future    CBC and Platelet; Future    Comprehensive metabolic panel    CBC and Platelet    Vitamin D 25 hydroxy; Future    Vitamin D 25 hydroxy

## 2024-10-22 NOTE — ASSESSMENT & PLAN NOTE
Lengthy d/w pt regarding increased CV and effects on kidney risks in DM patients and role of ACE inhibitor.    Verbalized understanding but does not want to resume.   Would like to have labs repeated in 3 months and then make decision  Orders:    Comprehensive metabolic panel; Future    CBC and Platelet; Future    Comprehensive metabolic panel    CBC and Platelet      Patient was counseled regarding instructions for management which included: impression/diagnosis, risk/benefits of treatment options, importance of compliance with treatment, risk factor reduction, and prognosis.   I have reviewed the instructions with the patient answering all questions and patient verbalized understanding.

## 2024-10-22 NOTE — PROGRESS NOTES
Ambulatory Visit  Name: Colin Pinon      : 1970      MRN: [id number]  Encounter Provider: PHYLLIS Lang  Encounter Date: 10/22/2024   Encounter department: [facility]    Assessment & Plan  Type 2 diabetes mellitus with hyperglycemia, without long-term current use of insulin ([facility])  Controlled. Doing well on current regimen  Being managed by endocrine.   Monitor.   Lab Results   Component Value Date    HGBA1C 5.3 2024     Orders:    Hemoglobin A1C; Future    Comprehensive metabolic panel; Future    CBC and Platelet; Future    Lipid Panel with Direct LDL reflex; Future    Hemoglobin A1C    Comprehensive metabolic panel    CBC and Platelet    Lipid Panel with Direct LDL reflex    Mixed hyperlipidemia  Pt stopped taking statin  Lengthy d/w pt regarding increased CV risks in DM patients and role of statins.   Verbalized understanding but does not want to resume.   Would like to have labs repeated in 3 months and then make decision  Heart healthy diet.   Orders:    Comprehensive metabolic panel; Future    CBC and Platelet; Future    Lipid Panel with Direct LDL reflex; Future    Comprehensive metabolic panel    CBC and Platelet    Lipid Panel with Direct LDL reflex    Vitamin D deficiency  Just started taking vitamin D supplement.   Will check level in 3 months  Orders:    Comprehensive metabolic panel; Future    CBC and Platelet; Future    Comprehensive metabolic panel    CBC and Platelet    Vitamin D 25 hydroxy; Future    Vitamin D 25 hydroxy    Essential hypertension  Lengthy d/w pt regarding increased CV and effects on kidney risks in DM patients and role of ACE inhibitor.    Verbalized understanding but does not want to resume.   Would like to have labs repeated in 3 months and then make decision  Orders:    Comprehensive metabolic panel; Future    CBC and Platelet; Future    Comprehensive metabolic panel    CBC and Platelet      Patient was counseled regarding  "instructions for management which included: impression/diagnosis, risk/benefits of treatment options, importance of compliance with treatment, risk factor reduction, and prognosis.   I have reviewed the instructions with the patient answering all questions and patient verbalized understanding.       History of Present Illness     Here for follow up DM and chronic issues  Currently on ozempic per endocrine. Has revamped diet and lost about 70 lbs.   Doing well  Stopped chol and bp meds . Wants to stay off.             Review of Systems   Constitutional:  Negative for fatigue.   Respiratory:  Negative for chest tightness and shortness of breath.    Cardiovascular:  Negative for chest pain, palpitations and leg swelling.   Gastrointestinal:  Negative for abdominal pain.   Musculoskeletal:  Negative for arthralgias and myalgias.   Neurological:  Negative for dizziness and headaches.   Psychiatric/Behavioral:  Negative for dysphoric mood. The patient is not nervous/anxious.            Objective     /68   Pulse 70   Temp (!) 95.1 °F (35.1 °C)   Resp 18   Ht 5' 11\" (1.803 m)   Wt 95.7 kg (211 lb)   BMI 29.43 kg/m²     Labs 9/17/2024  Chol 195, tg 86, hdl 33, ldl 145  A1C 5.3  Cmp wnl  Vitamin D 21.1      Physical Exam  Vitals reviewed.   Constitutional:       General: He is not in acute distress.     Appearance: He is not ill-appearing.   Neck:      Vascular: No carotid bruit.   Cardiovascular:      Rate and Rhythm: Normal rate and regular rhythm.      Pulses: no weak pulses.           Dorsalis pedis pulses are 2+ on the right side and 2+ on the left side.        Posterior tibial pulses are 2+ on the right side and 2+ on the left side.   Pulmonary:      Effort: Pulmonary effort is normal. No respiratory distress.      Breath sounds: Normal breath sounds. No wheezing or rales.   Musculoskeletal:      Cervical back: Normal range of motion.   Feet:      Right foot:      Skin integrity: No ulcer, skin breakdown, " erythema, warmth, callus or dry skin.      Left foot:      Skin integrity: No ulcer, skin breakdown, erythema, warmth, callus or dry skin.   Skin:     General: Skin is warm and dry.      Coloration: Skin is not jaundiced or pale.   Neurological:      General: No focal deficit present.      Mental Status: He is alert and oriented to person, place, and time.      Cranial Nerves: No cranial nerve deficit.      Sensory: No sensory deficit.   Psychiatric:         Mood and Affect: Mood normal.         Behavior: Behavior normal.         Thought Content: Thought content normal.         Judgment: Judgment normal.     Patient's shoes and socks removed.    Right Foot/Ankle   Right Foot Inspection  Skin Exam: skin normal and skin intact. No dry skin, no warmth, no callus, no erythema, no maceration, no abnormal color, no pre-ulcer, no ulcer and no callus.     Toe Exam: ROM and strength within normal limits.     Sensory   Monofilament testing: intact    Vascular  Capillary refills: < 3 seconds  The right DP pulse is 2+. The right PT pulse is 2+.     Left Foot/Ankle  Left Foot Inspection  Skin Exam: skin normal and skin intact. No dry skin, no warmth, no erythema, no maceration, normal color, no pre-ulcer, no ulcer and no callus.     Toe Exam: ROM and strength within normal limits.     Sensory   Monofilament testing: intact    Vascular  Capillary refills: < 3 seconds  The left DP pulse is 2+. The left PT pulse is 2+.     Assign Risk Category  No deformity present  No loss of protective sensation  No weak pulses  Risk: 0

## 2025-01-08 ENCOUNTER — OFFICE VISIT (OUTPATIENT)
Dept: ENDOCRINOLOGY | Facility: CLINIC | Age: 55
End: 2025-01-08
Payer: COMMERCIAL

## 2025-01-08 VITALS
SYSTOLIC BLOOD PRESSURE: 112 MMHG | HEART RATE: 80 BPM | DIASTOLIC BLOOD PRESSURE: 68 MMHG | HEIGHT: 71 IN | OXYGEN SATURATION: 98 % | BODY MASS INDEX: 28.14 KG/M2 | WEIGHT: 201 LBS

## 2025-01-08 DIAGNOSIS — I10 ESSENTIAL HYPERTENSION: ICD-10-CM

## 2025-01-08 DIAGNOSIS — E78.2 MIXED HYPERLIPIDEMIA: ICD-10-CM

## 2025-01-08 DIAGNOSIS — E11.65 TYPE 2 DIABETES MELLITUS WITH HYPERGLYCEMIA, WITHOUT LONG-TERM CURRENT USE OF INSULIN (HCC): Primary | ICD-10-CM

## 2025-01-08 DIAGNOSIS — E55.9 VITAMIN D DEFICIENCY: ICD-10-CM

## 2025-01-08 LAB — SL AMB POCT HEMOGLOBIN AIC: 5 (ref ?–6.5)

## 2025-01-08 PROCEDURE — 83036 HEMOGLOBIN GLYCOSYLATED A1C: CPT | Performed by: NURSE PRACTITIONER

## 2025-01-08 PROCEDURE — 99213 OFFICE O/P EST LOW 20 MIN: CPT | Performed by: NURSE PRACTITIONER

## 2025-01-08 NOTE — ASSESSMENT & PLAN NOTE
Recommend repeat vitamin D level. Not currently on supplementation.   Orders:    Vitamin D 25 hydroxy; Future

## 2025-01-08 NOTE — ASSESSMENT & PLAN NOTE
Lab Results   Component Value Date    HGBA1C 5.0 01/08/2025     HGA1C at goal. Discontinued ozempic at this time.     Discussed risks/complications associated with uncontrolled diabetes including organ involvement, heart attack, stroke, death.    Advised lifestyle modifications including attention to diet including the amount and types of carbohydrates consumed and regular activity.     Call for blood sugars less than 70 mg/dl or patterns over 250 mg/dl.     Monitor blood glucose levels at least intermittently.    Discussed symptoms and treatment of hypoglycemia.  Reviewed risks associated with hypoglycemia. Always carry rapid acting carbohydrates and a glucometer (a way to check your blood sugar).    Recommendation for medical identification either bracelet, necklace.    Recommendation for glucagon if on insulin.     Recommend routine follow up for diabetic eye and foot exams.     Ordered blood work to complete prior to next visit.    Send glucose logs/CGM download in 1-2 weeks for review    Follow up in 3 months, hemoglobin A1c demonstrates control, will return to care of PCP.     Orders:    POCT hemoglobin A1c    Comprehensive metabolic panel; Future    Lipid panel; Future    Hemoglobin A1C; Future

## 2025-01-08 NOTE — PROGRESS NOTES
Name: Colin Pinon      : 1970      MRN: [id number]  Encounter Provider: PHYLLIS Vela  Encounter Date: 2025   Encounter department: [facility] [place]  :  Assessment & Plan  Type 2 diabetes mellitus with hyperglycemia, without long-term current use of insulin ([facility])    Lab Results   Component Value Date    HGBA1C 5.0 2025     HGA1C at goal. Discontinued ozempic at this time.     Discussed risks/complications associated with uncontrolled diabetes including organ involvement, heart attack, stroke, death.    Advised lifestyle modifications including attention to diet including the amount and types of carbohydrates consumed and regular activity.     Call for blood sugars less than 70 mg/dl or patterns over 250 mg/dl.     Monitor blood glucose levels at least intermittently.    Discussed symptoms and treatment of hypoglycemia.  Reviewed risks associated with hypoglycemia. Always carry rapid acting carbohydrates and a glucometer (a way to check your blood sugar).    Recommendation for medical identification either bracelet, necklace.    Recommendation for glucagon if on insulin.     Recommend routine follow up for diabetic eye and foot exams.     Ordered blood work to complete prior to next visit.    Send glucose logs/CGM download in 1-2 weeks for review    Follow up in 3 months, hemoglobin A1c demonstrates control, will return to care of PCP.     Orders:    POCT hemoglobin A1c    Comprehensive metabolic panel; Future    Lipid panel; Future    Hemoglobin A1C; Future    Vitamin D deficiency  Recommend repeat vitamin D level. Not currently on supplementation.   Orders:    Vitamin D 25 hydroxy; Future    Essential hypertension  BP under good control.  Lisinopril recently discontinued by PCP.         Mixed hyperlipidemia  Statin recently discontinued by PCP.   Recommend repeat lipid panel to be completed by PCP.              History of Present Illness  "  HPI  Colin Pinon is a 54 y.o. male who presents for follow up of diabetes mellitus.     PCP recently discontinued lisinopril and statin.     UTD diabetic eye and foot exams.     Denies polyuria, polydipsia, vision changes, or sensation or skin integrity of feet.     Continues to consume carbohydrate conscious diet with regular physical activity.     Blood sugars range  mg/dL when checked intermittently.    On ozempic 0.25 mg weekly, tolerating without side effects.     History obtained from: patient    Review of Systems  See HPI.   All other systems reviewed and are negative.    Medical History Reviewed by provider this encounter:  Tobacco  Allergies  Meds  Problems  Med Hx  Surg Hx  Fam Hx     .  Current Outpatient Medications on File Prior to Visit   Medication Sig Dispense Refill    Blood Glucose Monitoring Suppl (OneTouch Verio) w/Device KIT Use in the morning 1 kit 0    glucose blood (OneTouch Verio) test strip Use as instructed 100 each 3    Lancets (onetouch ultrasoft) lancets Use as instructed 100 each 2    [DISCONTINUED] semaglutide, 0.25 or 0.5 mg/dose, (Ozempic, 0.25 or 0.5 MG/DOSE,) 2 mg/3 mL injection pen Inject 0.375 mL (0.25 mg total) under the skin every 7 days 9 mL 1    [DISCONTINUED] amoxicillin (AMOXIL) 875 mg tablet  (Patient not taking: Reported on 5/23/2024)      [DISCONTINUED] atorvastatin (LIPITOR) 20 mg tablet Take 1 tablet (20 mg total) by mouth daily (Patient not taking: Reported on 1/8/2025) 90 tablet 3    [DISCONTINUED] lisinopril (ZESTRIL) 10 mg tablet Take 1 tablet (10 mg total) by mouth daily (Patient not taking: Reported on 1/8/2025) 90 tablet 3     No current facility-administered medications on file prior to visit.         Objective   /68 (BP Location: Left arm, Patient Position: Sitting, Cuff Size: Large)   Pulse 80   Ht 5' 11\" (1.803 m)   Wt 91.2 kg (201 lb)   SpO2 98%   BMI 28.03 kg/m²         Physical Exam  Vitals reviewed.   Constitutional:       " Appearance: Normal appearance.   Cardiovascular:      Rate and Rhythm: Normal rate and regular rhythm.      Pulses: Normal pulses.      Heart sounds: Normal heart sounds.   Pulmonary:      Effort: Pulmonary effort is normal.      Breath sounds: Normal breath sounds.   Skin:     General: Skin is warm and dry.      Capillary Refill: Capillary refill takes less than 2 seconds.   Neurological:      General: No focal deficit present.      Mental Status: He is alert and oriented to person, place, and time.   Psychiatric:         Mood and Affect: Mood normal.         Behavior: Behavior normal.     Labs:   Component      Latest Ref Rng 9/16/2024 9/17/2024   Sodium      135 - 147 mmol/L 138     Potassium      3.5 - 5.3 mmol/L 4.1     Chloride      96 - 108 mmol/L 104     Carbon Dioxide      21 - 32 mmol/L 26     ANION GAP      4 - 13 mmol/L 8     BUN      5 - 25 mg/dL 21     Creatinine      0.60 - 1.30 mg/dL 0.99     GLUCOSE, FASTING      65 - 99 mg/dL 78     Calcium      8.4 - 10.2 mg/dL 9.7     AST      13 - 39 U/L 33     ALT      7 - 52 U/L 24     ALK PHOS      34 - 104 U/L 50     Total Protein      6.4 - 8.4 g/dL 6.7     Albumin      3.5 - 5.0 g/dL 4.3     Total Bilirubin      0.20 - 1.00 mg/dL 0.51     GFR, Calculated      ml/min/1.73sq m 87     Cholesterol      See Comment mg/dL  195    Triglycerides      See Comment mg/dL  86    HDL      >=40 mg/dL  33 (L)    LDL Calculated      0 - 100 mg/dL  145 (H)    Non-HDL Cholesterol      mg/dl  162    EXT Creatinine Urine      Reference range not established. mg/dL 77.6     Albumin,U,Random      <20.0 mg/L <7.0     Albumin Creat Ratio --     Hemoglobin A1C      Normal 4.0-5.6%; PreDiabetic 5.7-6.4%; Diabetic >=6.5%; Glycemic control for adults with diabetes <7.0% % 5.3     Hemoglobin A1C       5.2     eAG, EST AVG Glucose      mg/dl 105     VITAMIN D, 25-HYDROXY      30.0 - 100.0 ng/mL 21.1 (L)         Administrative Statements

## 2025-01-08 NOTE — ASSESSMENT & PLAN NOTE
Statin recently discontinued by PCP.   Recommend repeat lipid panel to be completed by PCP.

## 2025-01-21 ENCOUNTER — TELEPHONE (OUTPATIENT)
Age: 55
End: 2025-01-21

## 2025-01-21 ENCOUNTER — APPOINTMENT (OUTPATIENT)
Dept: LAB | Facility: CLINIC | Age: 55
End: 2025-01-21
Payer: COMMERCIAL

## 2025-01-21 NOTE — TELEPHONE ENCOUNTER
Patient called to see if there were active labs in his chart to be drawn.  Verified that there were.    Patient expressed understanding

## 2025-01-22 ENCOUNTER — RESULTS FOLLOW-UP (OUTPATIENT)
Dept: ENDOCRINOLOGY | Facility: CLINIC | Age: 55
End: 2025-01-22

## 2025-01-22 DIAGNOSIS — E83.52 HYPERCALCEMIA: ICD-10-CM

## 2025-01-22 DIAGNOSIS — E11.65 TYPE 2 DIABETES MELLITUS WITH HYPERGLYCEMIA, WITHOUT LONG-TERM CURRENT USE OF INSULIN (HCC): Primary | ICD-10-CM

## 2025-01-23 ENCOUNTER — OFFICE VISIT (OUTPATIENT)
Dept: FAMILY MEDICINE CLINIC | Facility: CLINIC | Age: 55
End: 2025-01-23
Payer: COMMERCIAL

## 2025-01-23 VITALS
SYSTOLIC BLOOD PRESSURE: 116 MMHG | HEIGHT: 71 IN | RESPIRATION RATE: 18 BRPM | TEMPERATURE: 97.3 F | HEART RATE: 105 BPM | DIASTOLIC BLOOD PRESSURE: 80 MMHG | WEIGHT: 200.6 LBS | BODY MASS INDEX: 28.08 KG/M2 | OXYGEN SATURATION: 99 %

## 2025-01-23 DIAGNOSIS — Z00.00 ANNUAL PHYSICAL EXAM: Primary | ICD-10-CM

## 2025-01-23 DIAGNOSIS — E78.5 DYSLIPIDEMIA: ICD-10-CM

## 2025-01-23 DIAGNOSIS — Z12.11 SCREENING FOR COLON CANCER: ICD-10-CM

## 2025-01-23 PROCEDURE — 99396 PREV VISIT EST AGE 40-64: CPT | Performed by: NURSE PRACTITIONER

## 2025-01-23 RX ORDER — ATORVASTATIN CALCIUM 10 MG/1
10 TABLET, FILM COATED ORAL DAILY
Qty: 90 TABLET | Refills: 1 | Status: SHIPPED | OUTPATIENT
Start: 2025-01-23

## 2025-01-23 NOTE — PATIENT INSTRUCTIONS
Resume cholesterol medication. Lipitor 10mg daily  Recheck labs in 3 months.   Heart healthy, carbohydrate controlled diet- limit red meat, limit saturated fat, moderate salt intake, limit junk food, etc.   Regular exercise  Stress management  Routine labwork and screenings as ordered.

## 2025-01-23 NOTE — PROGRESS NOTES
[facility] HEALTH MAINTENANCE OFFICE VISIT  [facility] - [facility] PRACT[facility]    NAME: Colin Pinon  AGE: 54 y.o. SEX: male  : 1970     DATE: 2025    Assessment and Plan   1. Annual physical exam (Primary)  Heart healthy, carbohydrate controlled diet- limit red meat, limit saturated fat, moderate salt intake, limit junk food, etc.   Regular exercise  Stress management  Routine labwork and screenings as ordered.       2. Dyslipidemia  Resume statin. Heart healthy diet. Repeat lipid in 3 months  - atorvastatin (LIPITOR) 10 mg tablet; Take 1 tablet (10 mg total) by mouth daily  Dispense: 90 tablet; Refill: 1  - Lipid panel; Future      3. Screening for colon cancer  - Ambulatory Referral to Gastroenterology; Future      Patient Counseling:   Nutrition: Stressed importance of a well balanced diet, moderation of sodium/saturated fat, caloric balance and sufficient intake of fiber  Exercise: Stressed the importance of regular exercise with a goal of 150 minutes per week  Dental Health: Discussed daily flossing and brushing and regular dental visits     Immunizations reviewed: Risks and Benefits discussed  Discussed benefits of:  Colon Cancer Screening and Screening labs.  BMI Counseling: Body mass index is 27.98 kg/m². Discussed with patient's BMI with him. The BMI is above normal. Nutrition recommendations include moderation in carbohydrate intake, reducing intake of saturated fat and trans fat, and reducing intake of cholesterol.            Chief Complaint     Chief Complaint   Patient presents with    Physical Exam     Lab review        History of Present Illness     Here for annual exam and lab review  Blood sugars much better. Has had significant weight loss. Following with endocrine.   Pt took self off statin several months ago. Seen in office in October at which time we had lengthy discussion about CV risks with DM and role of statins. Pt had opted to remain off  statin and wait to have labs repeated. Numbers have risen off statin.   Currently not on any medications.           Well Adult Physical   Patient here for a comprehensive physical exam.      Diet and Physical Activity  Diet: well balanced diet and low carbohydrate diet  Exercise: several times per week      Depression Screen  PHQ-2/9 Depression Screening    Little interest or pleasure in doing things: 0 - not at all  Feeling down, depressed, or hopeless: 0 - not at all  PHQ-2 Score: 0  PHQ-2 Interpretation: Negative depression screen          General Health  Hearing: Normal:  bilateral  Vision: goes for regular eye exams, most recent eye exam <1 year, and wears glasses  Dental: no dental visits for >1 year          The following portions of the patient's history were reviewed and updated as appropriate: allergies, current medications, past family history, past medical history, past social history, past surgical history and problem list.    Review of Systems     Review of Systems   Constitutional:  Negative for chills, diaphoresis, fatigue and fever.   HENT:  Negative for congestion, sinus pressure, sinus pain and sore throat.    Eyes:  Negative for visual disturbance.   Respiratory:  Negative for cough, chest tightness, shortness of breath and wheezing.    Cardiovascular:  Negative for chest pain, palpitations and leg swelling.   Gastrointestinal:  Negative for abdominal distention, abdominal pain, diarrhea and nausea.   Endocrine: Negative for polydipsia and polyuria.   Genitourinary:  Negative for dysuria, frequency and urgency.   Musculoskeletal:  Negative for arthralgias, back pain and neck pain.   Skin:  Negative for rash.   Allergic/Immunologic: Negative for immunocompromised state.   Neurological:  Negative for dizziness, weakness and headaches.   Psychiatric/Behavioral:  Negative for dysphoric mood. The patient is not nervous/anxious.        Past Medical History     Past Medical History:   Diagnosis Date     Class 2 severe obesity due to excess calories with serious comorbidity and body mass index (BMI) of 39.0 to 39.9 in adult ([facility]) 3/5/2019    Diabetes 1.5, managed as type 2 ([facility]) 3/5/2019    Essential hypertension 3/5/2019    Morbid obesity with BMI of 40.0-44.9, adult ([facility]) 3/5/2019       Past Surgical History     Past Surgical History:   Procedure Laterality Date    FRACTURE SURGERY      left ankle, plates and screws        Social History     Social History     Socioeconomic History    Marital status:      Spouse name: None    Number of children: 2    Years of education: None    Highest education level: None   Occupational History    None   Tobacco Use    Smoking status: Former     Current packs/day: 0.00     Types: Cigarettes     Quit date: 2004     Years since quittin.9     Passive exposure: Never    Smokeless tobacco: Former     Quit date: 1999   Vaping Use    Vaping status: Never Used   Substance and Sexual Activity    Alcohol use: No    Drug use: No    Sexual activity: Yes     Partners: Female     Birth control/protection: None   Other Topics Concern    None   Social History Narrative    None     Social Drivers of Health     Financial Resource Strain: Not on file   Food Insecurity: Not on file   Transportation Needs: Not on file   Physical Activity: Not on file   Stress: Not on file   Social Connections: Not on file   Intimate Partner Violence: Not on file   Housing Stability: Not on file       Family History     Family History   Problem Relation Age of Onset    Heart attack Maternal Grandmother     Diabetes Maternal Grandfather     Diabetes Paternal Grandmother     No Known Problems Son     No Known Problems Daughter     Mental illness Neg Hx     Substance Abuse Neg Hx        Current Medications       Current Outpatient Medications:     Blood Glucose Monitoring Suppl (OneTouch Verio) w/Device KIT, Use in the morning, Disp: 1 kit, Rfl: 0    glucose blood (OneTouch Verio) test strip, Use  as instructed, Disp: 100 each, Rfl: 3    Lancets (onetouch ultrasoft) lancets, Use as instructed, Disp: 100 each, Rfl: 2     Allergies     Allergies   Allergen Reactions    Pollen Extract Nasal Congestion     Itchy eyes       Objective     Recent Results (from the past 4 weeks)   POCT hemoglobin A1c    Collection Time: 01/08/25  7:44 AM   Result Value Ref Range    Hemoglobin A1C 5.0 <=6.5   Vitamin D 25 hydroxy    Collection Time: 01/21/25 11:30 AM   Result Value Ref Range    Vit D, 25-Hydroxy 56.0 30.0 - 100.0 ng/mL   Lipid Panel with Direct LDL reflex    Collection Time: 01/21/25 11:30 AM   Result Value Ref Range    Cholesterol 214 (H) See Comment mg/dL    Triglycerides 75 See Comment mg/dL    HDL, Direct 36 (L) >=40 mg/dL    LDL Calculated 163 (H) 0 - 100 mg/dL   CBC and Platelet    Collection Time: 01/21/25 11:30 AM   Result Value Ref Range    WBC 5.99 4.31 - 10.16 Thousand/uL    RBC 4.78 3.88 - 5.62 Million/uL    Hemoglobin 14.7 12.0 - 17.0 g/dL    Hematocrit 42.7 36.5 - 49.3 %    MCV 89 82 - 98 fL    MCH 30.8 26.8 - 34.3 pg    MCHC 34.4 31.4 - 37.4 g/dL    RDW 13.1 11.6 - 15.1 %    Platelets 247 149 - 390 Thousands/uL    MPV 9.1 8.9 - 12.7 fL   Comprehensive metabolic panel    Collection Time: 01/21/25 11:30 AM   Result Value Ref Range    Sodium 135 135 - 147 mmol/L    Potassium 3.9 3.5 - 5.3 mmol/L    Chloride 100 96 - 108 mmol/L    CO2 28 21 - 32 mmol/L    ANION GAP 7 4 - 13 mmol/L    BUN 16 5 - 25 mg/dL    Creatinine 1.17 0.60 - 1.30 mg/dL    Glucose, Fasting 86 65 - 99 mg/dL    Calcium 10.1 8.4 - 10.2 mg/dL    Corrected Calcium 10.8 (H) 8.3 - 10.1 mg/dL    AST 17 13 - 39 U/L    ALT 21 7 - 52 U/L    Alkaline Phosphatase 52 34 - 104 U/L    Total Protein 6.8 6.4 - 8.4 g/dL    Albumin 3.1 (L) 3.5 - 5.0 g/dL    Total Bilirubin 0.65 0.20 - 1.00 mg/dL    eGFR 70 ml/min/1.73sq m   (Labs 9/2024-   Chol 195, tg 86, hdl 33, ldl 145)  Reviewed lab/diagnostic results with pt including both normal and abnormal findings.  "  In depth counseling and instructions given. All questions answered during visit.     /80   Pulse 105   Temp (!) 97.3 °F (36.3 °C)   Resp 18   Ht 5' 11\" (1.803 m)   Wt 91 kg (200 lb 9.6 oz)   SpO2 99%   BMI 27.98 kg/m²      Physical Exam  Vitals reviewed.   Constitutional:       General: He is not in acute distress.     Appearance: Normal appearance. He is well-developed. He is not ill-appearing.   HENT:      Head: Normocephalic and atraumatic.      Right Ear: Tympanic membrane and ear canal normal.      Left Ear: Tympanic membrane and ear canal normal.      Nose: Nose normal.      Mouth/Throat:      Mouth: Mucous membranes are moist.      Pharynx: Oropharynx is clear.   Eyes:      General: No scleral icterus.     Extraocular Movements: Extraocular movements intact.      Pupils: Pupils are equal, round, and reactive to light.   Neck:      Thyroid: No thyromegaly.   Cardiovascular:      Rate and Rhythm: Normal rate and regular rhythm.      Heart sounds: No murmur heard.  Pulmonary:      Effort: Pulmonary effort is normal. No respiratory distress.      Breath sounds: Normal breath sounds. No wheezing or rales.   Abdominal:      General: Bowel sounds are normal. There is no distension.      Palpations: Abdomen is soft.      Tenderness: There is no abdominal tenderness.   Musculoskeletal:         General: Normal range of motion.      Cervical back: Normal range of motion and neck supple.      Right lower leg: No edema.      Left lower leg: No edema.   Lymphadenopathy:      Cervical: No cervical adenopathy.   Skin:     General: Skin is warm and dry.      Coloration: Skin is not jaundiced or pale.   Neurological:      General: No focal deficit present.      Mental Status: He is alert and oriented to person, place, and time.      Cranial Nerves: No cranial nerve deficit.      Sensory: No sensory deficit.   Psychiatric:         Mood and Affect: Mood normal.         Behavior: Behavior normal.         Thought " Content: Thought content normal.         Judgment: Judgment normal.           Vision Screening    Right eye Left eye Both eyes   Without correction      With correction 20/20 20/20 20/20           PHYLLIS Lang  [facility]

## 2025-01-27 ENCOUNTER — TELEPHONE (OUTPATIENT)
Age: 55
End: 2025-01-27

## 2025-01-27 ENCOUNTER — PREP FOR PROCEDURE (OUTPATIENT)
Age: 55
End: 2025-01-27

## 2025-01-27 DIAGNOSIS — Z12.11 SCREENING FOR COLON CANCER: Primary | ICD-10-CM

## 2025-01-27 NOTE — TELEPHONE ENCOUNTER
01/27/25  Screened by: Lizy Kay    Referring Provider PHYLLIS Lang    Pre- Screening:     There is no height or weight on file to calculate BMI.  27.98  Has patient been referred for a routine screening Colonoscopy? yes  Is the patient between 45-75 years old? yes      Previous Colonoscopy no   If yes:    Date:     Facility:     Reason:           Does the patient want to see a Gastroenterologist prior to their procedure OR are they having any GI symptoms? no    Has the patient been hospitalized or had abdominal surgery in the past 6 months? no    Does the patient use supplemental oxygen? no    Does the patient take Coumadin, Lovenox, Plavix, Elliquis, Xarelto, or other blood thinning medication? no    Has the patient had a stroke, cardiac event, or stent placed in the past year? no        If patient is between 45yrs - 49yrs, please advise patient that we will have to confirm benefits & coverage with their insurance company for a routine screening colonoscopy.

## 2025-01-27 NOTE — TELEPHONE ENCOUNTER
Scheduled date of colonoscopy (as of today):  2/11/25    Physician performing colonoscopy:  Dr. Santiago     Location of colonoscopy:  [facility]    Bowel prep reviewed with patient:  Serena GARCIA/DUL

## 2025-01-28 ENCOUNTER — ANESTHESIA (OUTPATIENT)
Dept: ANESTHESIOLOGY | Facility: HOSPITAL | Age: 55
End: 2025-01-28

## 2025-01-28 ENCOUNTER — ANESTHESIA EVENT (OUTPATIENT)
Dept: ANESTHESIOLOGY | Facility: HOSPITAL | Age: 55
End: 2025-01-28

## 2025-01-28 ENCOUNTER — APPOINTMENT (OUTPATIENT)
Dept: LAB | Facility: CLINIC | Age: 55
End: 2025-01-28
Payer: COMMERCIAL

## 2025-02-11 ENCOUNTER — ANESTHESIA (OUTPATIENT)
Dept: GASTROENTEROLOGY | Facility: AMBULARY SURGERY CENTER | Age: 55
End: 2025-02-11
Payer: COMMERCIAL

## 2025-02-11 ENCOUNTER — HOSPITAL ENCOUNTER (OUTPATIENT)
Dept: GASTROENTEROLOGY | Facility: AMBULARY SURGERY CENTER | Age: 55
Setting detail: OUTPATIENT SURGERY
Discharge: HOME | End: 2025-02-11
Attending: INTERNAL MEDICINE
Payer: COMMERCIAL

## 2025-02-11 VITALS
HEART RATE: 68 BPM | OXYGEN SATURATION: 99 % | TEMPERATURE: 97.6 F | DIASTOLIC BLOOD PRESSURE: 68 MMHG | SYSTOLIC BLOOD PRESSURE: 101 MMHG | RESPIRATION RATE: 16 BRPM

## 2025-02-11 DIAGNOSIS — Z12.11 SCREENING FOR COLON CANCER: ICD-10-CM

## 2025-02-11 LAB — GLUCOSE SERPL-MCNC: 91 MG/DL (ref 65–140)

## 2025-02-11 PROCEDURE — 82948 REAGENT STRIP/BLOOD GLUCOSE: CPT

## 2025-02-11 PROCEDURE — 45330 DIAGNOSTIC SIGMOIDOSCOPY: CPT | Performed by: INTERNAL MEDICINE

## 2025-02-11 RX ORDER — PROPOFOL 10 MG/ML
INJECTION, EMULSION INTRAVENOUS AS NEEDED
Status: DISCONTINUED | OUTPATIENT
Start: 2025-02-11 | End: 2025-02-11

## 2025-02-11 RX ORDER — SODIUM CHLORIDE, SODIUM LACTATE, POTASSIUM CHLORIDE, CALCIUM CHLORIDE 600; 310; 30; 20 MG/100ML; MG/100ML; MG/100ML; MG/100ML
INJECTION, SOLUTION INTRAVENOUS CONTINUOUS PRN
Status: DISCONTINUED | OUTPATIENT
Start: 2025-02-11 | End: 2025-02-11

## 2025-02-11 RX ORDER — SODIUM CHLORIDE, SODIUM LACTATE, POTASSIUM CHLORIDE, CALCIUM CHLORIDE 600; 310; 30; 20 MG/100ML; MG/100ML; MG/100ML; MG/100ML
125 INJECTION, SOLUTION INTRAVENOUS CONTINUOUS
Status: DISCONTINUED | OUTPATIENT
Start: 2025-02-11 | End: 2025-02-15 | Stop reason: HOSPADM

## 2025-02-11 RX ORDER — LIDOCAINE HYDROCHLORIDE 10 MG/ML
INJECTION, SOLUTION EPIDURAL; INFILTRATION; INTRACAUDAL; PERINEURAL AS NEEDED
Status: DISCONTINUED | OUTPATIENT
Start: 2025-02-11 | End: 2025-02-11

## 2025-02-11 RX ADMIN — LIDOCAINE HYDROCHLORIDE 50 MG: 10 INJECTION, SOLUTION EPIDURAL; INFILTRATION; INTRACAUDAL; PERINEURAL at 08:12

## 2025-02-11 RX ADMIN — PROPOFOL 100 MG: 10 INJECTION, EMULSION INTRAVENOUS at 08:12

## 2025-02-11 RX ADMIN — SODIUM CHLORIDE, SODIUM LACTATE, POTASSIUM CHLORIDE, AND CALCIUM CHLORIDE: .6; .31; .03; .02 INJECTION, SOLUTION INTRAVENOUS at 08:04

## 2025-02-11 NOTE — H&P
History and Physical -  [facility]  Colin Pinon 54 y.o. male MRN: [id number]    HPI: Colin Pinon is a 54 y.o. year old male who presents with colon cancer screening.       Review of Systems    Historical Information   Past Medical History:   Diagnosis Date    Class 2 severe obesity due to excess calories with serious comorbidity and body mass index (BMI) of 39.0 to 39.9 in adult ([facility]) 3/5/2019    Diabetes 1.5, managed as type 2 ([facility]) 3/5/2019    Essential hypertension 3/5/2019    Morbid obesity with BMI of 40.0-44.9, adult ([facility]) 3/5/2019     Past Surgical History:   Procedure Laterality Date    FRACTURE SURGERY      left ankle, plates and screws      Social History   Social History     Substance and Sexual Activity   Alcohol Use No     Social History     Substance and Sexual Activity   Drug Use No     Social History     Tobacco Use   Smoking Status Former    Current packs/day: 0.00    Types: Cigarettes    Quit date: 2004    Years since quittin.9    Passive exposure: Never   Smokeless Tobacco Former    Quit date: 1999     Family History   Problem Relation Age of Onset    Heart attack Maternal Grandmother     Diabetes Maternal Grandfather     Diabetes Paternal Grandmother     No Known Problems Son     No Known Problems Daughter     Mental illness Neg Hx     Substance Abuse Neg Hx        Meds/Allergies     Not in a hospital admission.    Allergies   Allergen Reactions    Pollen Extract Nasal Congestion     Itchy eyes       Objective     /84   Pulse 73   Temp 97.6 °F (36.4 °C) (Temporal)   Resp 18   SpO2 100%       PHYSICAL EXAM    Gen: NAD  CV: RRR  CHEST: Clear  ABD: soft, NT/ND  EXT: no edema  Neuro: AAO      ASSESSMENT/PLAN:  This is a 54 y.o. year old male here for  colon cancer screening.     PLAN:   Procedure: colonoscopy

## 2025-02-11 NOTE — ANESTHESIA POSTPROCEDURE EVALUATION
Post-Op Assessment Note    CV Status:  Stable  Pain Score: 0    Pain management: adequate       Mental Status:  Sleepy   Hydration Status:  Stable   PONV Controlled:  None   Airway Patency:  Patent  Two or more mitigation strategies used for obstructive sleep apnea   Post Op Vitals Reviewed: Yes    No anethesia notable event occurred.    Staff: CRNA           Last Filed PACU Vitals:  Vitals Value Taken Time   Temp     Pulse 75    /70    Resp 16    SpO2 99

## 2025-02-11 NOTE — ANESTHESIA PREPROCEDURE EVALUATION
Procedure:  COLONOSCOPY    Relevant Problems   CARDIO   (+) Essential hypertension      ENDO   (+) Type 2 diabetes mellitus with hyperglycemia, without long-term current use of insulin (HCC)      PULMONARY   (+) BROOKLYN (obstructive sleep apnea)        Physical Exam    Airway    Mallampati score: II  TM Distance: >3 FB  Neck ROM: full     Dental   No notable dental hx     Cardiovascular  Cardiovascular exam normal    Pulmonary  Pulmonary exam normal     Other Findings      Anesthesia Plan  ASA Score- 2     Anesthesia Type- IV sedation with anesthesia with ASA Monitors.         Additional Monitors:     Airway Plan:            Plan Factors-Exercise tolerance (METS): >4 METS.    Chart reviewed.   Existing labs reviewed. Patient summary reviewed.    Patient is not a current smoker.              Induction-     Postoperative Plan-     Perioperative Resuscitation Plan - Level 1 - Full Code.       Informed Consent- Anesthetic plan and risks discussed with patient.  I personally reviewed this patient with the CRNA. Discussed and agreed on the Anesthesia Plan with the CRNA..      NPO Status:  Vitals Value Taken Time   Date of last liquid 02/11/25 02/11/25 0721   Time of last liquid 0300 02/11/25 0721   Date of last solid 02/09/25 02/11/25 0721   Time of last solid 1800 02/11/25 0721

## 2025-02-12 ENCOUNTER — TELEPHONE (OUTPATIENT)
Age: 55
End: 2025-02-12

## 2025-02-12 ENCOUNTER — PREP FOR PROCEDURE (OUTPATIENT)
Age: 55
End: 2025-02-12

## 2025-02-12 DIAGNOSIS — Z12.11 SCREENING FOR COLON CANCER: Primary | ICD-10-CM

## 2025-02-12 NOTE — TELEPHONE ENCOUNTER
----- Message from Herber Santiago MD sent at 2/11/2025  1:05 PM EST -----  Patient with poor prep during his colonoscopy  Please call him, reschedule colonoscopy at his convenience with a 2-day bowel prep, would recommend magnesium citrate 2 nights before and full MiraLAX bowel prep as he has done this time

## 2025-02-12 NOTE — TELEPHONE ENCOUNTER
Scheduled date of colonoscopy (as of today): 3/6/25  Physician performing colonoscopy: Dr. Santiago  Location of colonoscopy: [facility]   Bowel prep reviewed with patient: miralax / dulcolax 2 day prep, Diabetic instructions, Screening vs diagnostic instructions sent via my chart   Instructions reviewed with patient by: N/A  Clearances: N/A    Pt called and rescheduled colonoscopy as per dr bonner with the same prep but 2 days prep.

## 2025-02-12 NOTE — LETTER
Attached are your prep instructions for your upcoming procedure on 3/6/25. If you have any questions or concerns please contact us at [phone].    Thank you,     [facility]    ? [facility] - [address] [place]  [address]  ? [facility] - [address], [place]  [address]  ? [facility] -[address] [place]  [address]  ? [facility]- [address] [place] [address] - Ilene Shirley Tobi - Entrance A - 1st FL  ? [facility] - [address] [address]  ? [facility] -[address] [place] [address]  ? [facility] - [address] [place] [address]  ? [facility]- [address] [place] [address]   ? [facility] - [address] , [facility] [address]  ? [facility] - [address] [place] [address]  ? [facility]-[address] [place] [address]  ? [facility] - [address], [address], [place] [address]    DATE OF PROCEDURE:  __________________               TIME OF PROCEDURE:  am  /  pm    The OR/GI Lab will contact you the evening prior to your procedure with your exact arrival time.    Our practice requires a 1 week notice for any cancellations or rescheduling.  We kindly ask that you immediately notify us of any changes.  Thank you for your cooperation.    PURCHASE: All items can be purchased over the counter at any pharmacy without a prescription.  Two 5 mg Dulcolax (bisacodyl)  tablets   Miralax 238 grams powder    Gatorade 64 oz - Nothing red, orange or purple  Magnesium Citrate 10oz bottle.     WEEK BEFORE YOUR PROCEDURE:  Stop taking Iron tablets.  5 days prior, AVOID vegetables and fruits with skins or seeds, nuts, corn, popcorn and whole grain breads.   TWO DAYS BEFORE THE PROCEDURE:   CLEAR liquids only for entire day prior. Nothing red, orange or purple.  You CAN have:                                                                Soda  Water  Broth Gatorade  Jello  Popsicles Coffee/tea without milk/creamer   AVOID:   Solid foods   Milk and milk products   Juice with pulp  AT 6pm (two days prior) take bottle of Magnesium Citrate    PREPARATION (evening before procedure):  ?? At 5pm:  Take two 5 mg Dulcolax laxative tablets.  ?? At 6pm: Dilute the entire Miralax container with 64oz Gatorade.  Drink an 8oz glass every 15 minutes until the first 32oz is gone.   ?? Drink remaining 32oz of Miralax prep:?3am complete by 4am on the day of procedure. (AM procedure)           ?7am complete by 8am on the day of procedure. (PM procedure)    Be sure you are completed at least 3 hours before you leave your home.  NOTHING TO EAT OR DRINK AFTER MIDNIGHT- EXCEPT FOR YOUR PREP  DAY OF THE PROCEDURE:  You may brush your teeth.  Leave all jewelry at home.  Please arrive for your procedure as indicated by the OR / GI Lab / Endoscopy Unit.  Make sure you have arranged ahead of time for a responsible adult (18 or older) to drive you home after the procedure.  Going home in an Uber, taxi, or bus is not allowed.  The effects of the anesthesia can persist for 24 hours.  After receiving the sedation, you must exercise caution before engaging in any activity that could harm yourself and others (such as driving a car).  Do not make any important decisions or do not drink any alcoholic beverages during this time period.  After your procedure, you may have anything you'd like to eat or drink.  You will probably want to start with something light.  Please include plenty of fluids.  Avoid items that cause gas such as sodas and salads.    SPECIAL INSTRUCTIONS:  Blood thinner (i.e. - Coumadin, Pradaxa, Lovenox, Xarelto, Elquis)  ?  Continue (Do Not Stop)  ? Stop______________for_____________days prior to the procedure.  ?  Please discuss with your PCP or Cardiologist and forward instructions to our  office    Antiplatelet (i.e. - Plavix, Aggrenox, Effient, Brilinta)  ?  Continue (Do Not Stop)  ? Stop______________for_____________days prior to the procedure.  ?  Please discuss with your prescribing physician and forward instructions to our office     Diabetes:   If you are Diabetic please see separate Diabetic Instruction Sheet          Prescribed medications:  Do not stop your aspirin, or any of your other medications.  Take the rest of your prescribed medications with sips of water at least 2 hours prior to your appointment.     NOTHING TO EAT OR DRINK INCLUDING CHEWING GUM (Except bowel preparation if instructed) AFTER 12 MIDNIGHT PRIOR TO YOUR ND

## 2025-02-20 ENCOUNTER — ANESTHESIA EVENT (OUTPATIENT)
Dept: ANESTHESIOLOGY | Facility: HOSPITAL | Age: 55
End: 2025-02-20

## 2025-02-20 ENCOUNTER — ANESTHESIA (OUTPATIENT)
Dept: ANESTHESIOLOGY | Facility: HOSPITAL | Age: 55
End: 2025-02-20

## 2025-03-06 ENCOUNTER — ANESTHESIA (OUTPATIENT)
Dept: GASTROENTEROLOGY | Facility: AMBULARY SURGERY CENTER | Age: 55
End: 2025-03-06
Payer: COMMERCIAL

## 2025-03-06 ENCOUNTER — HOSPITAL ENCOUNTER (OUTPATIENT)
Dept: GASTROENTEROLOGY | Facility: AMBULARY SURGERY CENTER | Age: 55
Setting detail: OUTPATIENT SURGERY
End: 2025-03-06
Attending: INTERNAL MEDICINE
Payer: COMMERCIAL

## 2025-03-06 VITALS
TEMPERATURE: 98.1 F | DIASTOLIC BLOOD PRESSURE: 59 MMHG | HEART RATE: 70 BPM | SYSTOLIC BLOOD PRESSURE: 106 MMHG | RESPIRATION RATE: 18 BRPM | OXYGEN SATURATION: 100 %

## 2025-03-06 DIAGNOSIS — Z12.11 SCREENING FOR COLON CANCER: ICD-10-CM

## 2025-03-06 PROCEDURE — G0121 COLON CA SCRN NOT HI RSK IND: HCPCS | Performed by: INTERNAL MEDICINE

## 2025-03-06 RX ORDER — SODIUM CHLORIDE, SODIUM LACTATE, POTASSIUM CHLORIDE, CALCIUM CHLORIDE 600; 310; 30; 20 MG/100ML; MG/100ML; MG/100ML; MG/100ML
125 INJECTION, SOLUTION INTRAVENOUS CONTINUOUS
Status: DISCONTINUED | OUTPATIENT
Start: 2025-03-06 | End: 2025-03-10 | Stop reason: HOSPADM

## 2025-03-06 RX ORDER — SODIUM CHLORIDE, SODIUM LACTATE, POTASSIUM CHLORIDE, CALCIUM CHLORIDE 600; 310; 30; 20 MG/100ML; MG/100ML; MG/100ML; MG/100ML
INJECTION, SOLUTION INTRAVENOUS CONTINUOUS PRN
Status: DISCONTINUED | OUTPATIENT
Start: 2025-03-06 | End: 2025-03-06

## 2025-03-06 RX ORDER — LIDOCAINE HYDROCHLORIDE 10 MG/ML
INJECTION, SOLUTION EPIDURAL; INFILTRATION; INTRACAUDAL; PERINEURAL AS NEEDED
Status: DISCONTINUED | OUTPATIENT
Start: 2025-03-06 | End: 2025-03-06

## 2025-03-06 RX ORDER — PROPOFOL 10 MG/ML
INJECTION, EMULSION INTRAVENOUS AS NEEDED
Status: DISCONTINUED | OUTPATIENT
Start: 2025-03-06 | End: 2025-03-06

## 2025-03-06 RX ORDER — PROPOFOL 10 MG/ML
INJECTION, EMULSION INTRAVENOUS CONTINUOUS PRN
Status: DISCONTINUED | OUTPATIENT
Start: 2025-03-06 | End: 2025-03-06

## 2025-03-06 RX ADMIN — PROPOFOL 120 MCG/KG/MIN: 10 INJECTION, EMULSION INTRAVENOUS at 10:16

## 2025-03-06 RX ADMIN — SODIUM CHLORIDE, SODIUM LACTATE, POTASSIUM CHLORIDE, AND CALCIUM CHLORIDE: .6; .31; .03; .02 INJECTION, SOLUTION INTRAVENOUS at 08:28

## 2025-03-06 RX ADMIN — LIDOCAINE HYDROCHLORIDE 5 ML: 10 INJECTION, SOLUTION EPIDURAL; INFILTRATION; INTRACAUDAL; PERINEURAL at 10:16

## 2025-03-06 RX ADMIN — SODIUM CHLORIDE, SODIUM LACTATE, POTASSIUM CHLORIDE, AND CALCIUM CHLORIDE 125 ML/HR: .6; .31; .03; .02 INJECTION, SOLUTION INTRAVENOUS at 09:58

## 2025-03-06 RX ADMIN — PROPOFOL 100 MG: 10 INJECTION, EMULSION INTRAVENOUS at 10:16

## 2025-03-06 NOTE — ANESTHESIA PREPROCEDURE EVALUATION
Procedure:  COLONOSCOPY    Relevant Problems   ANESTHESIA (within normal limits)      CARDIO   (+) Essential hypertension      ENDO   (+) Type 2 diabetes mellitus with hyperglycemia, without long-term current use of insulin (HCC)      PULMONARY   (+) BROOKLYN (obstructive sleep apnea)        Physical Exam    Airway    Mallampati score: II  TM Distance: >3 FB  Neck ROM: full     Dental   No notable dental hx     Cardiovascular  Cardiovascular exam normal    Pulmonary  Pulmonary exam normal     Other Findings        Anesthesia Plan  ASA Score- 2     Anesthesia Type- IV sedation with anesthesia with ASA Monitors.         Additional Monitors:     Airway Plan:            Plan Factors-Exercise tolerance (METS): >4 METS.    Chart reviewed.   Existing labs reviewed. Patient summary reviewed.    Patient is not a current smoker.              Induction-     Postoperative Plan-     Perioperative Resuscitation Plan - Level 1 - Full Code.       Informed Consent- Anesthetic plan and risks discussed with patient.  I personally reviewed this patient with the CRNA. Discussed and agreed on the Anesthesia Plan with the CRNA..      NPO Status:  Vitals Value Taken Time   Date of last liquid 03/06/25 03/06/25 0952   Time of last liquid 0600 03/06/25 0952   Date of last solid 03/04/25 03/06/25 0952   Time of last solid 2000 03/06/25 0952

## 2025-03-06 NOTE — ANESTHESIA POSTPROCEDURE EVALUATION
Post-Op Assessment Note    CV Status:  Stable         Mental Status:  Alert   PONV Controlled:  Controlled   Airway Patency:  Patent     Post Op Vitals Reviewed: Yes    No anethesia notable event occurred.    Staff: CRNA           Last Filed PACU Vitals:  Vitals Value Taken Time   Temp     Pulse 76    BP 91/54    Resp 20    SpO2 99

## 2025-03-06 NOTE — H&P
History and Physical -  [facility]  Colin Pinon 54 y.o. male MRN: [id number]    HPI: Colin Pinon is a 54 y.o. year old male who presents with screening colonoscopy.       Review of Systems    Historical Information   Past Medical History:   Diagnosis Date    Class 2 severe obesity due to excess calories with serious comorbidity and body mass index (BMI) of 39.0 to 39.9 in adult ([facility]) 3/5/2019    Diabetes 1.5, managed as type 2 ([facility]) 3/5/2019    Essential hypertension 3/5/2019    Morbid obesity with BMI of 40.0-44.9, adult ([facility]) 3/5/2019     Past Surgical History:   Procedure Laterality Date    FRACTURE SURGERY      left ankle, plates and screws      Social History   Social History     Substance and Sexual Activity   Alcohol Use No     Social History     Substance and Sexual Activity   Drug Use No     Social History     Tobacco Use   Smoking Status Former    Current packs/day: 0.00    Types: Cigarettes    Quit date: 2004    Years since quittin.0    Passive exposure: Never   Smokeless Tobacco Former    Quit date: 1999     Family History   Problem Relation Age of Onset    Heart attack Maternal Grandmother     Diabetes Maternal Grandfather     Diabetes Paternal Grandmother     No Known Problems Son     No Known Problems Daughter     Mental illness Neg Hx     Substance Abuse Neg Hx        Meds/Allergies     Not in a hospital admission.    Allergies   Allergen Reactions    Pollen Extract Nasal Congestion     Itchy eyes       Objective     /88   Pulse 72   Temp 98.1 °F (36.7 °C) (Temporal)   Resp 18   SpO2 99%       PHYSICAL EXAM    Gen: NAD  CV: RRR  CHEST: Clear  ABD: soft, NT/ND  EXT: no edema  Neuro: AAO      ASSESSMENT/PLAN:  This is a 54 y.o. year old male here for  screening colonoscopy.     PLAN:   Procedure: colonoscopy

## 2025-03-06 NOTE — PERIOPERATIVE NURSING NOTE
Patient brought from procedure to phase two. Sbar given at bedside to GI rn  . Patient resting in bed, bed locked in lowest position with side rails raise, call light in reach and environment cleared. Plan of care ongoing.   
Received pt. From procedure room. VSS. [facility] with call bell within reach.  
DISPLAY PLAN FREE TEXT

## 2025-04-10 ENCOUNTER — OFFICE VISIT (OUTPATIENT)
Dept: FAMILY MEDICINE CLINIC | Facility: CLINIC | Age: 55
End: 2025-04-10
Payer: COMMERCIAL

## 2025-04-10 VITALS
OXYGEN SATURATION: 99 % | RESPIRATION RATE: 18 BRPM | TEMPERATURE: 97 F | HEIGHT: 71 IN | HEART RATE: 68 BPM | WEIGHT: 210.6 LBS | SYSTOLIC BLOOD PRESSURE: 112 MMHG | BODY MASS INDEX: 29.48 KG/M2 | DIASTOLIC BLOOD PRESSURE: 70 MMHG

## 2025-04-10 DIAGNOSIS — F41.9 ANXIETY: Primary | ICD-10-CM

## 2025-04-10 PROCEDURE — 99214 OFFICE O/P EST MOD 30 MIN: CPT | Performed by: NURSE PRACTITIONER

## 2025-04-10 RX ORDER — HYDROXYZINE HYDROCHLORIDE 25 MG/1
25 TABLET, FILM COATED ORAL 2 TIMES DAILY PRN
Qty: 30 TABLET | Refills: 0 | Status: SHIPPED | OUTPATIENT
Start: 2025-04-10 | End: 2025-04-11 | Stop reason: SDUPTHER

## 2025-04-10 NOTE — PATIENT INSTRUCTIONS
Stress management. Activities to divert attention when possible.   Conscious breathing techniques as discussed.   Coping mechanisms and strategies vary from person to person so try to utilize strategies that you think may work for you (such as meditation, music, etc. ).  continue counseling as discussed.   Hydroxyzine as needed for anxiety/panic  Complete questionnaire for ADHD as discussed.

## 2025-04-10 NOTE — PROGRESS NOTES
"Name: Colin Pinon      : 1970      MRN: [id number]  Encounter Provider: PHYLLIS Lang  Encounter Date: 4/10/2025   Encounter department: [facility]  :  Assessment & Plan  Anxiety  Unsure if symptoms anxiety vs untreated ADHD.  Pt will complete [employer] functional tool and will review together in office  Will contact HR to obtain paperwork for FMLA  Stress management. Activities to divert attention when possible.   Conscious breathing techniques as discussed.   Coping mechanisms and strategies vary from person to person so try to utilize strategies that you think may work for you (such as meditation, music, etc. ).  continue counseling as discussed.   Hydroxyzine as needed for anxiety/panic  Complete questionnaire for ADHD as discussed.   Orders:    hydrOXYzine HCL (ATARAX) 25 mg tablet; Take 1 tablet (25 mg total) by mouth 2 (two) times a day as needed for anxiety           History of Present Illness   Here to discuss anxiety  Seeing therapist  Was advised by therapist to see PCP about getting leave of absence from work  When a child, was diagnosed with ADHD and was on meds for short period of time.  to about 6th grade.   Does feel like having trouble completing tasks. No motivation to start new task. Trouble with focus.       Anxiety  Symptoms include decreased concentration and nervous/anxious behavior. Patient reports no palpitations or suicidal ideas.         Review of Systems   Constitutional:  Negative for unexpected weight change.   Respiratory:  Negative for chest tightness.    Cardiovascular:  Negative for palpitations.   Allergic/Immunologic: Negative for immunocompromised state.   Psychiatric/Behavioral:  Positive for decreased concentration. Negative for dysphoric mood, self-injury and suicidal ideas. The patient is nervous/anxious.        Objective   /70   Pulse 68   Temp (!) 97 °F (36.1 °C)   Resp 18   Ht 5' 11\" (1.803 m)   Wt 95.5 kg " (210 lb 9.6 oz)   SpO2 99%   BMI 29.37 kg/m²      Physical Exam  Vitals reviewed.   Constitutional:       General: He is not in acute distress.     Appearance: He is not ill-appearing.   Cardiovascular:      Rate and Rhythm: Normal rate.   Pulmonary:      Effort: Pulmonary effort is normal.   Skin:     General: Skin is warm and dry.      Coloration: Skin is not jaundiced or pale.   Neurological:      General: No focal deficit present.      Mental Status: He is alert and oriented to person, place, and time.      Cranial Nerves: No cranial nerve deficit.      Sensory: No sensory deficit.   Psychiatric:         Mood and Affect: Mood normal.         Behavior: Behavior normal.         Thought Content: Thought content normal.         Judgment: Judgment normal.

## 2025-04-10 NOTE — ASSESSMENT & PLAN NOTE
Unsure if symptoms anxiety vs untreated ADHD.  Pt will complete [employer] functional tool and will review together in office  Will contact HR to obtain paperwork for FMLA  Stress management. Activities to divert attention when possible.   Conscious breathing techniques as discussed.   Coping mechanisms and strategies vary from person to person so try to utilize strategies that you think may work for you (such as meditation, music, etc. ).  continue counseling as discussed.   Hydroxyzine as needed for anxiety/panic  Complete questionnaire for ADHD as discussed.   Orders:    hydrOXYzine HCL (ATARAX) 25 mg tablet; Take 1 tablet (25 mg total) by mouth 2 (two) times a day as needed for anxiety

## 2025-04-11 ENCOUNTER — OFFICE VISIT (OUTPATIENT)
Dept: FAMILY MEDICINE CLINIC | Facility: CLINIC | Age: 55
End: 2025-04-11
Payer: COMMERCIAL

## 2025-04-11 VITALS
RESPIRATION RATE: 18 BRPM | TEMPERATURE: 97.5 F | DIASTOLIC BLOOD PRESSURE: 76 MMHG | HEIGHT: 71 IN | OXYGEN SATURATION: 98 % | SYSTOLIC BLOOD PRESSURE: 122 MMHG | HEART RATE: 78 BPM | WEIGHT: 210.8 LBS | BODY MASS INDEX: 29.51 KG/M2

## 2025-04-11 DIAGNOSIS — F41.9 ANXIETY: Primary | ICD-10-CM

## 2025-04-11 DIAGNOSIS — F90.2 ATTENTION DEFICIT HYPERACTIVITY DISORDER (ADHD), COMBINED TYPE: ICD-10-CM

## 2025-04-11 PROCEDURE — 99214 OFFICE O/P EST MOD 30 MIN: CPT | Performed by: NURSE PRACTITIONER

## 2025-04-11 RX ORDER — HYDROXYZINE HYDROCHLORIDE 25 MG/1
25 TABLET, FILM COATED ORAL 2 TIMES DAILY PRN
Qty: 30 TABLET | Refills: 0 | Status: SHIPPED | OUTPATIENT
Start: 2025-04-11

## 2025-04-11 RX ORDER — DEXTROAMPHETAMINE SACCHARATE, AMPHETAMINE ASPARTATE, DEXTROAMPHETAMINE SULFATE AND AMPHETAMINE SULFATE 2.5; 2.5; 2.5; 2.5 MG/1; MG/1; MG/1; MG/1
10 TABLET ORAL
Qty: 30 TABLET | Refills: 0 | Status: SHIPPED | OUTPATIENT
Start: 2025-04-11

## 2025-04-11 NOTE — PROGRESS NOTES
Name: Colin Pinon      : 1970      MRN: [id number]  Encounter Provider: PHYLLIS Lang  Encounter Date: 2025   Encounter department: [facility]  :  Assessment & Plan  Anxiety  Stress management. Activities to divert attention when possible.   Conscious breathing techniques as discussed.   Coping mechanisms and strategies vary from person to person so try to utilize strategies that you think may work for you (such as meditation, music, etc. ).  continue counseling as discussed.        Attention deficit hyperactivity disorder (ADHD), combined type  Will trial course of Adderall 10mg daily  Re-eval in office in 2 weeks  Continue counseling  Orders:    amphetamine-dextroamphetamine (ADDERALL, 10MG,) 10 mg tablet; Take 1 tablet (10 mg total) by mouth 2 (two) times a day Max Daily Amount: 20 mg           History of Present Illness   Here for follow up on anxiety/adhd  Having trouble with focus and concentration   Anxiety recent  Difficulty completing tasks, difficulty starting tasks. Feels affecting work performance.   Has been missing days due to increased anxiety.   Has been seeing therapist for counseling.   Was just given rx for hydroxyzine but has not used yet.   Does not really want to take daily anxiety meds.   Was treated as child for ADHD but no treatment as adult  Does not know what has triggered increased anxiety or why cannot get anything done. Sometimes just stares at computer and cannot focus to get anything done.   Does not want to be out of work but has called out because some days just cannot get focused and cannot concentrate.   Was given paperwork from  , thinks is FMLA, short term disability.       Review of Systems   Constitutional:  Negative for unexpected weight change.   Allergic/Immunologic: Negative for immunocompromised state.   Psychiatric/Behavioral:  Positive for decreased concentration. Negative for dysphoric mood, self-injury and  "suicidal ideas. The patient is nervous/anxious.        Objective   /76   Pulse 78   Temp 97.5 °F (36.4 °C)   Resp 18   Ht 5' 11\" (1.803 m)   Wt 95.6 kg (210 lb 12.8 oz)   SpO2 98%   BMI 29.40 kg/m²      Physical Exam  Vitals reviewed.   Constitutional:       General: He is not in acute distress.     Appearance: He is not ill-appearing.   Cardiovascular:      Rate and Rhythm: Normal rate.   Pulmonary:      Effort: Pulmonary effort is normal.   Skin:     General: Skin is warm and dry.      Coloration: Skin is not jaundiced or pale.   Neurological:      General: No focal deficit present.      Mental Status: He is alert and oriented to person, place, and time.      Cranial Nerves: No cranial nerve deficit.      Sensory: No sensory deficit.   Psychiatric:         Mood and Affect: Mood normal.         Behavior: Behavior normal.         Thought Content: Thought content normal.         Judgment: Judgment normal.         "

## 2025-04-11 NOTE — ASSESSMENT & PLAN NOTE
Stress management. Activities to divert attention when possible.   Conscious breathing techniques as discussed.   Coping mechanisms and strategies vary from person to person so try to utilize strategies that you think may work for you (such as meditation, music, etc. ).  continue counseling as discussed.

## 2025-04-11 NOTE — ASSESSMENT & PLAN NOTE
Will trial course of Adderall 10mg daily  Re-eval in office in 2 weeks  Continue counseling  Orders:    amphetamine-dextroamphetamine (ADDERALL, 10MG,) 10 mg tablet; Take 1 tablet (10 mg total) by mouth 2 (two) times a day Max Daily Amount: 20 mg

## 2025-04-25 ENCOUNTER — TELEPHONE (OUTPATIENT)
Dept: FAMILY MEDICINE CLINIC | Facility: CLINIC | Age: 55
End: 2025-04-25

## 2025-04-25 NOTE — TELEPHONE ENCOUNTER
LMOM for patient to call us back to schedule his DM followup/lab review appt that was missed this week.

## 2025-05-13 ENCOUNTER — TELEPHONE (OUTPATIENT)
Dept: ENDOCRINOLOGY | Facility: CLINIC | Age: 55
End: 2025-05-13